# Patient Record
Sex: FEMALE | Race: WHITE | NOT HISPANIC OR LATINO | Employment: UNEMPLOYED | URBAN - NONMETROPOLITAN AREA
[De-identification: names, ages, dates, MRNs, and addresses within clinical notes are randomized per-mention and may not be internally consistent; named-entity substitution may affect disease eponyms.]

---

## 2018-03-22 ENCOUNTER — HOSPITAL ENCOUNTER (OUTPATIENT)
Facility: HOSPITAL | Age: 28
Setting detail: OBSERVATION
Discharge: HOME | End: 2018-03-23
Attending: EMERGENCY MEDICINE | Admitting: OBSTETRICS & GYNECOLOGY

## 2018-03-22 DIAGNOSIS — O21.0 HYPEREMESIS GRAVIDARUM: Primary | ICD-10-CM

## 2018-03-22 LAB
ANION GAP SERPL CALCULATED.3IONS-SCNC: 22.2 MMOL/L
BACTERIA UR QL AUTO: ABNORMAL /HPF
BILIRUB UR QL STRIP: ABNORMAL
BUN BLD-MCNC: 6 MG/DL (ref 7–20)
BUN/CREAT SERPL: 10 (ref 7.1–23.5)
CALCIUM SPEC-SCNC: 9.6 MG/DL (ref 8.4–10.2)
CHLORIDE SERPL-SCNC: 92 MMOL/L (ref 98–107)
CLARITY UR: ABNORMAL
CO2 SERPL-SCNC: 26 MMOL/L (ref 26–30)
COLOR UR: YELLOW
CREAT BLD-MCNC: 0.6 MG/DL (ref 0.6–1.3)
DEPRECATED RDW RBC AUTO: 43.4 FL (ref 37–54)
ERYTHROCYTE [DISTWIDTH] IN BLOOD BY AUTOMATED COUNT: 13.1 % (ref 11.5–14.5)
GFR SERPL CREATININE-BSD FRML MDRD: 118 ML/MIN/1.73
GLUCOSE BLD-MCNC: 89 MG/DL (ref 74–98)
GLUCOSE UR STRIP-MCNC: NEGATIVE MG/DL
HCG INTACT+B SERPL-ACNC: NORMAL MIU/ML
HCT VFR BLD AUTO: 43.6 % (ref 37–47)
HGB BLD-MCNC: 15.1 G/DL (ref 12–16)
HGB UR QL STRIP.AUTO: ABNORMAL
HYALINE CASTS UR QL AUTO: ABNORMAL /LPF
KETONES UR QL STRIP: ABNORMAL
LEUKOCYTE ESTERASE UR QL STRIP.AUTO: NEGATIVE
LYMPHOCYTES # BLD MANUAL: 1.48 10*3/MM3 (ref 0.6–3.4)
LYMPHOCYTES NFR BLD MANUAL: 11 % (ref 0–12)
LYMPHOCYTES NFR BLD MANUAL: 11 % (ref 10–50)
MCH RBC QN AUTO: 31.4 PG (ref 27–31)
MCHC RBC AUTO-ENTMCNC: 34.6 G/DL (ref 30–37)
MCV RBC AUTO: 90.6 FL (ref 81–99)
MONOCYTES # BLD AUTO: 1.48 10*3/MM3 (ref 0–0.9)
MUCOUS THREADS URNS QL MICRO: ABNORMAL /HPF
NEUTROPHILS # BLD AUTO: 10.52 10*3/MM3 (ref 2–6.9)
NEUTROPHILS NFR BLD MANUAL: 77 % (ref 37–80)
NEUTS BAND NFR BLD MANUAL: 1 % (ref 0–6)
NITRITE UR QL STRIP: NEGATIVE
PH UR STRIP.AUTO: 5.5 [PH] (ref 5–8)
PLATELET # BLD AUTO: 348 10*3/MM3 (ref 130–400)
PMV BLD AUTO: 9 FL (ref 6–12)
POTASSIUM BLD-SCNC: 3.2 MMOL/L (ref 3.5–5.1)
PROT UR QL STRIP: ABNORMAL
RBC # BLD AUTO: 4.81 10*6/MM3 (ref 4.2–5.4)
RBC # UR: ABNORMAL /HPF
RBC MORPH BLD: NORMAL
REF LAB TEST METHOD: ABNORMAL
SCAN SLIDE: NORMAL
SMALL PLATELETS BLD QL SMEAR: ADEQUATE
SODIUM BLD-SCNC: 137 MMOL/L (ref 137–145)
SP GR UR STRIP: >=1.03 (ref 1–1.03)
SQUAMOUS #/AREA URNS HPF: ABNORMAL /HPF
UROBILINOGEN UR QL STRIP: ABNORMAL
WBC MORPH BLD: NORMAL
WBC NRBC COR # BLD: 13.49 10*3/MM3 (ref 4.8–10.8)
WBC UR QL AUTO: ABNORMAL /HPF

## 2018-03-22 PROCEDURE — G0378 HOSPITAL OBSERVATION PER HR: HCPCS

## 2018-03-22 PROCEDURE — 25810000003 DEXTROSE-NACL PER 500 ML: Performed by: NURSE PRACTITIONER

## 2018-03-22 PROCEDURE — 85007 BL SMEAR W/DIFF WBC COUNT: CPT | Performed by: PHYSICIAN ASSISTANT

## 2018-03-22 PROCEDURE — 96375 TX/PRO/DX INJ NEW DRUG ADDON: CPT

## 2018-03-22 PROCEDURE — 96361 HYDRATE IV INFUSION ADD-ON: CPT

## 2018-03-22 PROCEDURE — 25010000002 PROCHLORPERAZINE EDISYLATE PER 10 MG: Performed by: PHYSICIAN ASSISTANT

## 2018-03-22 PROCEDURE — 96376 TX/PRO/DX INJ SAME DRUG ADON: CPT

## 2018-03-22 PROCEDURE — 84702 CHORIONIC GONADOTROPIN TEST: CPT | Performed by: PHYSICIAN ASSISTANT

## 2018-03-22 PROCEDURE — 25010000002 ONDANSETRON PER 1 MG: Performed by: PHYSICIAN ASSISTANT

## 2018-03-22 PROCEDURE — 99284 EMERGENCY DEPT VISIT MOD MDM: CPT

## 2018-03-22 PROCEDURE — 25010000003 POTASSIUM CHLORIDE 10 MEQ/100ML SOLUTION: Performed by: PHYSICIAN ASSISTANT

## 2018-03-22 PROCEDURE — 80048 BASIC METABOLIC PNL TOTAL CA: CPT | Performed by: PHYSICIAN ASSISTANT

## 2018-03-22 PROCEDURE — 25010000002 PROMETHAZINE PER 50 MG: Performed by: NURSE PRACTITIONER

## 2018-03-22 PROCEDURE — 81001 URINALYSIS AUTO W/SCOPE: CPT | Performed by: PHYSICIAN ASSISTANT

## 2018-03-22 PROCEDURE — 25010000002 PYRIDOXINE PER 100 MG: Performed by: NURSE PRACTITIONER

## 2018-03-22 PROCEDURE — 96365 THER/PROPH/DIAG IV INF INIT: CPT

## 2018-03-22 PROCEDURE — 85025 COMPLETE CBC W/AUTO DIFF WBC: CPT | Performed by: PHYSICIAN ASSISTANT

## 2018-03-22 RX ORDER — FAMOTIDINE 10 MG/ML
20 INJECTION, SOLUTION INTRAVENOUS EVERY 12 HOURS SCHEDULED
Status: DISCONTINUED | OUTPATIENT
Start: 2018-03-22 | End: 2018-03-23 | Stop reason: HOSPADM

## 2018-03-22 RX ORDER — POTASSIUM CHLORIDE 750 MG/1
40 CAPSULE, EXTENDED RELEASE ORAL ONCE
Status: DISCONTINUED | OUTPATIENT
Start: 2018-03-22 | End: 2018-03-23 | Stop reason: HOSPADM

## 2018-03-22 RX ORDER — SODIUM CHLORIDE 0.9 % (FLUSH) 0.9 %
1-10 SYRINGE (ML) INJECTION AS NEEDED
Status: DISCONTINUED | OUTPATIENT
Start: 2018-03-22 | End: 2018-03-23 | Stop reason: HOSPADM

## 2018-03-22 RX ORDER — ONDANSETRON 2 MG/ML
4 INJECTION INTRAMUSCULAR; INTRAVENOUS ONCE
Status: COMPLETED | OUTPATIENT
Start: 2018-03-22 | End: 2018-03-22

## 2018-03-22 RX ORDER — PROMETHAZINE HYDROCHLORIDE 25 MG/ML
12.5 INJECTION, SOLUTION INTRAMUSCULAR; INTRAVENOUS EVERY 4 HOURS PRN
Status: DISCONTINUED | OUTPATIENT
Start: 2018-03-22 | End: 2018-03-23 | Stop reason: HOSPADM

## 2018-03-22 RX ORDER — FAMOTIDINE 10 MG/ML
20 INJECTION, SOLUTION INTRAVENOUS ONCE
Status: COMPLETED | OUTPATIENT
Start: 2018-03-22 | End: 2018-03-22

## 2018-03-22 RX ORDER — DEXTROSE AND SODIUM CHLORIDE 5; .9 G/100ML; G/100ML
125 INJECTION, SOLUTION INTRAVENOUS CONTINUOUS
Status: DISCONTINUED | OUTPATIENT
Start: 2018-03-22 | End: 2018-03-23 | Stop reason: HOSPADM

## 2018-03-22 RX ORDER — POTASSIUM CHLORIDE 7.45 MG/ML
10 INJECTION INTRAVENOUS ONCE
Status: COMPLETED | OUTPATIENT
Start: 2018-03-22 | End: 2018-03-22

## 2018-03-22 RX ADMIN — POTASSIUM CHLORIDE 10 MEQ: 10 INJECTION, SOLUTION INTRAVENOUS at 16:56

## 2018-03-22 RX ADMIN — PROCHLORPERAZINE EDISYLATE 5 MG: 5 INJECTION INTRAMUSCULAR; INTRAVENOUS at 22:46

## 2018-03-22 RX ADMIN — DEXTROSE AND SODIUM CHLORIDE 125 ML/HR: 5; 900 INJECTION, SOLUTION INTRAVENOUS at 18:45

## 2018-03-22 RX ADMIN — FAMOTIDINE 20 MG: 10 INJECTION INTRAVENOUS at 15:19

## 2018-03-22 RX ADMIN — SODIUM CHLORIDE 1000 ML: 9 INJECTION, SOLUTION INTRAVENOUS at 15:19

## 2018-03-22 RX ADMIN — PROMETHAZINE HYDROCHLORIDE 12.5 MG: 25 INJECTION INTRAMUSCULAR; INTRAVENOUS at 19:08

## 2018-03-22 RX ADMIN — FAMOTIDINE 20 MG: 10 INJECTION, SOLUTION INTRAVENOUS at 20:58

## 2018-03-22 RX ADMIN — PYRIDOXINE HYDROCHLORIDE 50 MG: 100 INJECTION, SOLUTION INTRAMUSCULAR; INTRAVENOUS at 19:50

## 2018-03-22 RX ADMIN — ONDANSETRON 4 MG: 2 INJECTION INTRAMUSCULAR; INTRAVENOUS at 17:10

## 2018-03-22 RX ADMIN — PROCHLORPERAZINE EDISYLATE 5 MG: 5 INJECTION INTRAMUSCULAR; INTRAVENOUS at 16:12

## 2018-03-22 NOTE — ED PROVIDER NOTES
Subjective   28-year-old female around 4-6 weeks pregnant with her second pregnancy complaining of nausea and vomiting.  The patient states she has been unable to eat for the past 4 days.  She is able to hold down some fluids but vomits all food.  She was seen at the [place] [facility] 3 days ago and given IV fluids, IV Zofran and Phenergan.  The patient states this provided no relief.Denies any vaginal bleeding or spotting.  Does have some creamy discharge that she states is not bothersome.   Her last menstrual period was January 12 to the 14th.  She states this was a normal cycle for her.  She does not take any form of birth control.  She denies any urinary problems.  She does have some heartburn.  Denies any fever, chest pain, cough or difficulty breathing.  She smokes less than a half a pack of cigarettes daily.  She is an appointment to see Dr. Corona on the 28th.        History provided by:  Patient and parent  History limited by: no limits.   used: No        Review of Systems   Constitutional: Negative.  Negative for activity change, appetite change, fatigue and fever.   HENT: Negative.  Negative for congestion, ear pain, rhinorrhea, sneezing and sore throat.    Eyes: Negative.  Negative for pain, discharge and redness.   Respiratory: Negative.  Negative for cough, shortness of breath and wheezing.    Cardiovascular: Negative.    Gastrointestinal: Positive for nausea and vomiting. Negative for abdominal pain, constipation and diarrhea.   Endocrine: Negative.    Genitourinary: Negative.  Negative for difficulty urinating, dysuria and frequency.   Musculoskeletal: Negative.  Negative for back pain and neck pain.   Skin: Negative.  Negative for color change, pallor and rash.   All other systems reviewed and are negative.      History reviewed. No pertinent past medical history.    No Known Allergies    History reviewed. No pertinent surgical history.    History reviewed. No pertinent family  history.    Social History     Social History   • Marital status:      Social History Main Topics   • Smoking status: Current Every Day Smoker     Packs/day: 0.50   • Smokeless tobacco: Never Used   • Alcohol use No   • Drug use: No   • Sexual activity: Defer     Other Topics Concern   • Not on file           Objective   Physical Exam   Constitutional: She is oriented to person, place, and time. She appears well-developed and well-nourished. No distress.   HENT:   Head: Normocephalic and atraumatic.   Right Ear: External ear normal.   Left Ear: External ear normal.   Nose: Nose normal.   Mouth/Throat: No oropharyngeal exudate.   Dry oral mucosa   Eyes: Conjunctivae and EOM are normal. Pupils are equal, round, and reactive to light. Right eye exhibits no discharge. Left eye exhibits no discharge. No scleral icterus.   Neck: Normal range of motion. Neck supple. No JVD present. No tracheal deviation present.   Cardiovascular: Normal rate, regular rhythm and normal heart sounds.    Pulmonary/Chest: Effort normal and breath sounds normal. No stridor. No respiratory distress. She has no wheezes. She has no rales.   Abdominal: Soft. Bowel sounds are normal. She exhibits no distension and no mass. There is no tenderness. There is no rebound and no guarding. No hernia.   Musculoskeletal: Normal range of motion. She exhibits no edema, tenderness or deformity.   Neurological: She is alert and oriented to person, place, and time. No cranial nerve deficit. Coordination normal.   Skin: Skin is warm and dry. No rash noted. She is not diaphoretic. No erythema.   Psychiatric: She has a normal mood and affect. Her behavior is normal. Judgment and thought content normal.   Nursing note and vitals reviewed.      Procedures         ED Course  ED Course   Comment By Time   Still c/o nausea, no vomiting in ED Anuradha Hill PA-C 03/22 1544   BMP glucose 89, BUN 6, creatinine 0.6, sodium 137, potassium 3.2, chloride 92, CO2 26.   Urinalysis greater than 160 of ketones, 100 of protein, leukocyte Estrace and nitrite negative. Anuradha Hill PA-C 03/22 1547   Pt does not feel any better, is agreeable to admission if needed. Anuradha Hill PA-C 03/22 1611   CBC white count 13, hemoglobin 15.1, hematocrit 43.6, platelets 348. Anuradha Hill PA-C 03/22 1615   Discussed with Dr. Corona who accepted pt for obs/admit. Anuradha Hill PA-C 03/22 1622                  MDM  Number of Diagnoses or Management Options  Hyperemesis gravidarum: established and worsening     Amount and/or Complexity of Data Reviewed  Clinical lab tests: ordered and reviewed  Tests in the medicine section of CPT®: ordered and reviewed  Discuss the patient with other providers: yes    Risk of Complications, Morbidity, and/or Mortality  Presenting problems: moderate  Diagnostic procedures: moderate  Management options: moderate    Patient Progress  Patient progress: stable      Final diagnoses:   Hyperemesis gravidarum            Anuradha Hill PA-C  03/22/18 1635       Anuradha Hill PA-C  03/22/18 1636

## 2018-03-22 NOTE — ED NOTES
Dr. Corona was called at 1621 per SHEILA Ling. Call transferred at this time.      Babatunde Grewal  03/22/18 8916

## 2018-03-22 NOTE — ED NOTES
PT IS STILL EATING ICE CHIPS. PT STILL NAUSEATED BUT NO LONGER VOMITING.      Marla Almaraz, RN  03/22/18 9741

## 2018-03-23 ENCOUNTER — APPOINTMENT (OUTPATIENT)
Dept: ULTRASOUND IMAGING | Facility: HOSPITAL | Age: 28
End: 2018-03-23

## 2018-03-23 VITALS
DIASTOLIC BLOOD PRESSURE: 82 MMHG | BODY MASS INDEX: 31.99 KG/M2 | RESPIRATION RATE: 22 BRPM | HEIGHT: 65 IN | WEIGHT: 192 LBS | HEART RATE: 57 BPM | OXYGEN SATURATION: 98 % | SYSTOLIC BLOOD PRESSURE: 143 MMHG | TEMPERATURE: 96.5 F

## 2018-03-23 LAB
ANION GAP SERPL CALCULATED.3IONS-SCNC: 20.1 MMOL/L
BASOPHILS # BLD AUTO: 0.05 10*3/MM3 (ref 0–0.2)
BASOPHILS NFR BLD AUTO: 0.4 % (ref 0–2.5)
CHLORIDE SERPL-SCNC: 102 MMOL/L (ref 98–107)
CO2 SERPL-SCNC: 22 MMOL/L (ref 26–30)
DEPRECATED RDW RBC AUTO: 44.4 FL (ref 37–54)
EOSINOPHIL # BLD AUTO: 0.04 10*3/MM3 (ref 0–0.7)
EOSINOPHIL NFR BLD AUTO: 0.4 % (ref 0–7)
ERYTHROCYTE [DISTWIDTH] IN BLOOD BY AUTOMATED COUNT: 13.3 % (ref 11.5–14.5)
HCT VFR BLD AUTO: 37.4 % (ref 37–47)
HGB BLD-MCNC: 12.9 G/DL (ref 12–16)
IMM GRANULOCYTES # BLD: 0.05 10*3/MM3 (ref 0–0.06)
IMM GRANULOCYTES NFR BLD: 0.4 % (ref 0–0.6)
LYMPHOCYTES # BLD AUTO: 1.67 10*3/MM3 (ref 0.6–3.4)
LYMPHOCYTES NFR BLD AUTO: 15 % (ref 10–50)
MCH RBC QN AUTO: 31.4 PG (ref 27–31)
MCHC RBC AUTO-ENTMCNC: 34.5 G/DL (ref 30–37)
MCV RBC AUTO: 91 FL (ref 81–99)
MONOCYTES # BLD AUTO: 1.05 10*3/MM3 (ref 0–0.9)
MONOCYTES NFR BLD AUTO: 9.4 % (ref 0–12)
NEUTROPHILS # BLD AUTO: 8.27 10*3/MM3 (ref 2–6.9)
NEUTROPHILS NFR BLD AUTO: 74.4 % (ref 37–80)
NRBC BLD MANUAL-RTO: 0 /100 WBC (ref 0–0)
PLATELET # BLD AUTO: 284 10*3/MM3 (ref 130–400)
PMV BLD AUTO: 9.2 FL (ref 6–12)
POTASSIUM BLD-SCNC: 3.1 MMOL/L (ref 3.5–5.1)
RBC # BLD AUTO: 4.11 10*6/MM3 (ref 4.2–5.4)
SODIUM BLD-SCNC: 141 MMOL/L (ref 137–145)
TSH SERPL DL<=0.05 MIU/L-ACNC: 1.51 MIU/ML (ref 0.47–4.68)
WBC NRBC COR # BLD: 11.13 10*3/MM3 (ref 4.8–10.8)

## 2018-03-23 PROCEDURE — 76801 OB US < 14 WKS SINGLE FETUS: CPT

## 2018-03-23 PROCEDURE — 96376 TX/PRO/DX INJ SAME DRUG ADON: CPT

## 2018-03-23 PROCEDURE — 25810000003 DEXTROSE-NACL PER 500 ML: Performed by: NURSE PRACTITIONER

## 2018-03-23 PROCEDURE — G0378 HOSPITAL OBSERVATION PER HR: HCPCS

## 2018-03-23 PROCEDURE — 80051 ELECTROLYTE PANEL: CPT | Performed by: OBSTETRICS & GYNECOLOGY

## 2018-03-23 PROCEDURE — 99217 PR OBSERVATION CARE DISCHARGE MANAGEMENT: CPT | Performed by: NURSE PRACTITIONER

## 2018-03-23 PROCEDURE — 25010000002 PROMETHAZINE PER 50 MG: Performed by: NURSE PRACTITIONER

## 2018-03-23 PROCEDURE — 96361 HYDRATE IV INFUSION ADD-ON: CPT

## 2018-03-23 PROCEDURE — 85025 COMPLETE CBC W/AUTO DIFF WBC: CPT | Performed by: OBSTETRICS & GYNECOLOGY

## 2018-03-23 PROCEDURE — 25010000002 ONDANSETRON PER 1 MG

## 2018-03-23 PROCEDURE — 84443 ASSAY THYROID STIM HORMONE: CPT | Performed by: OBSTETRICS & GYNECOLOGY

## 2018-03-23 RX ORDER — ONDANSETRON 2 MG/ML
INJECTION INTRAMUSCULAR; INTRAVENOUS
Status: COMPLETED
Start: 2018-03-23 | End: 2018-03-23

## 2018-03-23 RX ORDER — ONDANSETRON 2 MG/ML
INJECTION INTRAMUSCULAR; INTRAVENOUS
Status: DISPENSED
Start: 2018-03-23 | End: 2018-03-23

## 2018-03-23 RX ORDER — ONDANSETRON 2 MG/ML
4 INJECTION INTRAMUSCULAR; INTRAVENOUS ONCE
Status: COMPLETED | OUTPATIENT
Start: 2018-03-23 | End: 2018-03-23

## 2018-03-23 RX ORDER — PROMETHAZINE HYDROCHLORIDE 25 MG/1
12.5 SUPPOSITORY RECTAL EVERY 6 HOURS PRN
Qty: 30 SUPPOSITORY | Refills: 0 | Status: SHIPPED | OUTPATIENT
Start: 2018-03-23 | End: 2018-06-28 | Stop reason: HOSPADM

## 2018-03-23 RX ORDER — RANITIDINE 150 MG/1
150 TABLET ORAL 2 TIMES DAILY
Qty: 60 TABLET | Refills: 2 | Status: SHIPPED | OUTPATIENT
Start: 2018-03-23 | End: 2018-04-22

## 2018-03-23 RX ORDER — SODIUM CHLORIDE 9 MG/ML
INJECTION, SOLUTION INTRAVENOUS
Status: DISCONTINUED
Start: 2018-03-23 | End: 2018-03-23 | Stop reason: HOSPADM

## 2018-03-23 RX ADMIN — DEXTROSE AND SODIUM CHLORIDE 125 ML/HR: 5; 900 INJECTION, SOLUTION INTRAVENOUS at 03:58

## 2018-03-23 RX ADMIN — ONDANSETRON 4 MG: 2 INJECTION INTRAMUSCULAR; INTRAVENOUS at 04:27

## 2018-03-23 RX ADMIN — FAMOTIDINE 20 MG: 10 INJECTION, SOLUTION INTRAVENOUS at 09:23

## 2018-03-23 RX ADMIN — PROMETHAZINE HYDROCHLORIDE 12.5 MG: 25 INJECTION INTRAMUSCULAR; INTRAVENOUS at 02:48

## 2018-03-23 RX ADMIN — PROMETHAZINE HYDROCHLORIDE 12.5 MG: 25 INJECTION INTRAMUSCULAR; INTRAVENOUS at 12:24

## 2018-03-23 RX ADMIN — PROMETHAZINE HYDROCHLORIDE 12.5 MG: 25 INJECTION INTRAMUSCULAR; INTRAVENOUS at 08:04

## 2018-03-23 NOTE — PLAN OF CARE
Problem: Hyperemesis Gravidarum (Adult,Obstetrics,Pediatric)  Goal: Signs and Symptoms of Listed Potential Problems Will be Absent, Minimized or Managed (Hyperemesis Gravidarum)   03/23/18 0342   Goal/Outcome Evaluation   Problems Assessed (Nausea/Vomiting/Hyperemesis in Pregnancy) all   Problems Present (Hyperemesis) none       Problem: Patient Care Overview  Goal: Plan of Care Review  Outcome: Ongoing (interventions implemented as appropriate)   03/23/18 0342   Coping/Psychosocial   Plan of Care Reviewed With patient   Plan of Care Review   Progress no change   OTHER   Outcome Summary intermittent nausea     Goal: Individualization and Mutuality  Outcome: Ongoing (interventions implemented as appropriate)   03/23/18 0342   Individualization   Patient Specific Interventions medications for nausea     Goal: Discharge Needs Assessment  Outcome: Ongoing (interventions implemented as appropriate)   03/23/18 0342   Discharge Needs Assessment   Concerns to be Addressed denies needs/concerns at this time   Patient/Family Anticipates Transition to home with family   Patient/Family Anticipated Services at Transition none   Transportation Concerns car, none   Anticipated Changes Related to Illness none   Equipment Needed After Discharge none   Disability   Equipment Currently Used at Home none

## 2018-03-23 NOTE — NURSING NOTE
Penelope in pt room to discuss POC and d/c.  Pt verbalized understanding and has no further questions.

## 2018-03-23 NOTE — PROGRESS NOTES
27 yo  admitted through ER last evening for hyperemesis  S-patient reports she still feels very nauseous this am- has had popsicle but nothing else thus far. She reports her LMP was  which would put her at 9-10 weeks gestation. She reports history of regular menses.   O-patient sitting up in bed this am. Appears no distress. VSS. Labs reviewed. Receiving potassium  A-hyperemesis- exact gestation unknown  P-transvaginal ultrasound ordered for dating-continue IV fluids and antiemetics

## 2018-03-23 NOTE — PLAN OF CARE
Problem: Patient Care Overview  Goal: Interprofessional Rounds/Family Conf  Outcome: Outcome(s) achieved Date Met: 03/23/18

## 2018-03-23 NOTE — DISCHARGE SUMMARY
Discharge Summary     Virgilio Levy  : 1990  MRN: [id number]  CSN: [id number]    Date of Admission: 3/22/2018   Date of Discharge:  3/23/2018       Admission Diagnosis: 1. Hyperemesis gravidarum [O21.0]  2. Hyperemesis gravidarum [O21.0]   Discharge Diagnosis: 1. Same as above plus  2. Pregnancy at Unknown     Hospital Course  Patient is a 28 y.o.  who at 8-10 wks gestation states she is able to tolerate fluids now.  Would like to go home - requesting phenergan suppository and desires something for acid reflux.  She does have a appt in our office in the next several weeks.       Min/max vitals past 24 hours:   Temp  Min: 96.5 °F (35.8 °C)  Max: 99.1 °F (37.3 °C)  BP  Min: 106/61  Max: 144/72  Pulse  Min: 55  Max: 110  Resp  Min: 18  Max: 24         General: well developed; well nourished  no acute distress   Heart: Not performed.   Lungs: breathing is unlabored   Abdomen: Not performed.   FHT's: too early for FHT's with doppler               Discharge labs  Lab Results   Component Value Date    WBC 11.13 (H) 2018    HGB 12.9 2018    HCT 37.4 2018     2018       Discharge Medications  There are no discharge medications for this patient.       Discharge Disposition sat - encourage PNV     Condition on Discharge: good   Follow-up: As scheduled     Brittany SARGENT/JAGDEEP  3/23/2018

## 2018-03-23 NOTE — NURSING NOTE
Pt d/c teaching done and instructions given.  Pt advised when to seek medical attention.  Pt verbalized understanding and has no further questions.

## 2018-03-23 NOTE — NURSING NOTE
"Spoke with patient regarding the risk of taking Zofran due to studies showing correlation of cleft palate in .Explained the risks.  Pt verbalized understanding but told this RN that she \"wanted it\".   "

## 2018-03-23 NOTE — PLAN OF CARE
Problem: Hyperemesis Gravidarum (Adult,Obstetrics,Pediatric)  Goal: Signs and Symptoms of Listed Potential Problems Will be Absent, Minimized or Managed (Hyperemesis Gravidarum)  Outcome: Outcome(s) achieved Date Met: 03/23/18 03/23/18 3526   Goal/Outcome Evaluation   Problems Assessed (Nausea/Vomiting/Hyperemesis in Pregnancy) all   Problems Present (Hyperemesis) none

## 2018-03-23 NOTE — PLAN OF CARE
Problem: Patient Care Overview  Goal: Plan of Care Review  Outcome: Outcome(s) achieved Date Met: 03/23/18 03/23/18 1723   Coping/Psychosocial   Plan of Care Reviewed With patient   Plan of Care Review   Progress improving   OTHER   Outcome Summary n/v improving     Goal: Individualization and Mutuality  Outcome: Outcome(s) achieved Date Met: 03/23/18 03/23/18 1723   Individualization   Patient Specific Preferences decreased n/v   Patient Specific Goals (Include Timeframe) n/v decreased   Patient Specific Interventions medications for n/v   Mutuality/Individual Preferences   What Anxieties, Fears, Concerns, or Questions Do You Have About Your Care? continuing to have n/v throughout pregnancy   What Information Would Help Us Give You More Personalized Care? has child at home   How Would You and/or Your Support Person Like to Participate in Your Care? able to choose foods that don't cause n/v   Mutuality/Individual Preferences   How to Address Anxieties/Fears support, comfort, reassure     Goal: Discharge Needs Assessment  Outcome: Outcome(s) achieved Date Met: 03/23/18 03/23/18 1723   Discharge Needs Assessment   Readmission Within the Last 30 Days no previous admission in last 30 days   Concerns to be Addressed no discharge needs identified   Patient/Family Anticipates Transition to home with family   Patient/Family Anticipated Services at Transition none   Transportation Anticipated family or friend will provide   Anticipated Changes Related to Illness none   Equipment Needed After Discharge none   Discharge Coordination/Progress d/c home with instructions and medications   Disability   Equipment Currently Used at Home none

## 2018-03-25 NOTE — H&P
Pt was seen in the emergency room - full phys exam and hx was documented  Pt accepted to 2nd floor for continuation of care for hyperemesis.  She received RSO for hyperemesis gravidarum - and was discharged home the next day - see d/c report.

## 2018-03-28 ENCOUNTER — INITIAL PRENATAL (OUTPATIENT)
Dept: OBSTETRICS AND GYNECOLOGY | Facility: CLINIC | Age: 28
End: 2018-03-28

## 2018-03-28 VITALS — BODY MASS INDEX: 31.12 KG/M2 | DIASTOLIC BLOOD PRESSURE: 68 MMHG | SYSTOLIC BLOOD PRESSURE: 124 MMHG | WEIGHT: 187 LBS

## 2018-03-28 DIAGNOSIS — Z34.91 PREGNANT AND NOT YET DELIVERED IN FIRST TRIMESTER: Primary | ICD-10-CM

## 2018-03-28 DIAGNOSIS — Z34.91 NORMAL PREGNANCY, FIRST TRIMESTER: ICD-10-CM

## 2018-03-28 LAB
C TRACH RRNA SPEC DONR QL NAA+PROBE: NEGATIVE
N GONORRHOEA DNA SPEC QL NAA+PROBE: NEGATIVE

## 2018-03-28 PROCEDURE — 99214 OFFICE O/P EST MOD 30 MIN: CPT | Performed by: NURSE PRACTITIONER

## 2018-03-28 NOTE — PROGRESS NOTES
Chief Complaint   Patient presents with   • Initial Prenatal Visit     LMP 17, seen in ED on 3/23/18 for nausea and vomiting and had scan there, pt due for pap smear, large ketones on urine dip         HPI  , 10w5d presents to our office today for initial prenatal visit.  She reports 1st trimester discomforts of pregnancy including, n/v, fatigue, andres/irritability.  She also reports she was in hospital last wk for hyperemesis and now feeling better.      Review Hx: + smoker - trying to quit  Not presently working or in school   Not taking PNV       Past Medical History:   Diagnosis Date   • Acid reflux    • Anemia         Current Outpatient Prescriptions:   •  promethazine (PHENERGAN) 25 MG suppository, Insert 0.5 suppositories into the rectum Every 6 (Six) Hours As Needed for Nausea for up to 30 doses., Disp: 30 suppository, Rfl: 0  •  raNITIdine (ZANTAC) 150 MG tablet, Take 1 tablet by mouth 2 (Two) Times a Day for 30 days., Disp: 60 tablet, Rfl: 2   No Known Allergies   Past Surgical History:   Procedure Laterality Date   • MANDIBLE SURGERY         Social History     Social History   • Marital status:      Spouse name: N/A   • Number of children: N/A   • Years of education: N/A     Occupational History   • Not on file.     Social History Main Topics   • Smoking status: Current Every Day Smoker     Packs/day: 0.50   • Smokeless tobacco: Never Used   • Alcohol use No   • Drug use: No   • Sexual activity: Yes     Partners: Male     Birth control/ protection: None     Other Topics Concern   • Not on file     Social History Narrative   • No narrative on file      Family History   Problem Relation Age of Onset   • Hypertension Father    • Coronary artery disease Father    • Hypertension Mother    • Diabetes Mother    • Diabetes Paternal Grandfather        The following portions of the patient's history were reviewed and updated as appropriate:problem list, current medications, allergies, past  family history, past medical history, past social history and past surgical history.    ROS    Pertinent items are noted in HPI, all other systems reviewed and negative    Physical Exam  /68   Wt 84.8 kg (187 lb)   LMP 01/13/2018 (Exact Date)   BMI 31.12 kg/m²        Psych: Altert and oriented to time, place and person  Mood and affect appropriate   General: well developed; well nourished  no acute distress  Head: normocephalic  Neck: The neck is supple and the trachea is midline  Musculoskeletal: Normal gait  Full range of motion  Lungs:  breathing is unlabored  Back: Negative CVAT  Abdomen: Soft, non-tender, no organomegaly  Lower Extremities: LE: Neg edema  Genitourinary: External Genitalia without erythema, lesions, or masses, Urethral Meatus is without erythema, edema, prolapse or lesions., Bladder - Palpation at the region above the symphysis pubis , Vagina - There is no excessive vaginal discharge & vaginal walls reveals moist vaginal mucosa without inflammation or lesions. There is not evidence of pelvic relaxation , Cervix  is smooth, pink, and without discharge. Negative cervical motion tenderness , Uterus - uterine body is mobile and of normal size, shape, & without tenderness, Adnexa structures are nontender and without masses, Perineum is without inflammation or lesions      MDM  Impression:  Problems/Risks: Pregnancy with Active Problems(s) &/or Complication(s)  dehydration / resolving / nausea -vomiting   Discomforts of pregnancy  Tobacco use in pregnancy   Tests done today: Pap  Routine NOB labs / U/A Culture / GC/CT - option for CF screening    Topics discussed: Rout NOB education including nutrition, exercise, OTCmeds, genetic screening   adeq rest and fluids   phenergan  Tobacco use  encouraged questions - call prn    Tests next visit: none

## 2018-03-29 LAB
ABO GROUP BLD: (no result)
BASOPHILS # BLD AUTO: 0 X10E3/UL (ref 0–0.2)
BASOPHILS NFR BLD AUTO: 0 %
BLD GP AB SCN SERPL QL: NEGATIVE
EOSINOPHIL # BLD AUTO: 0.5 X10E3/UL (ref 0–0.4)
EOSINOPHIL NFR BLD AUTO: 4 %
ERYTHROCYTE [DISTWIDTH] IN BLOOD BY AUTOMATED COUNT: 14.5 % (ref 12.3–15.4)
HBV SURFACE AG SERPL QL IA: NEGATIVE
HCT VFR BLD AUTO: 43.8 % (ref 34–46.6)
HGB BLD-MCNC: 14.9 G/DL (ref 11.1–15.9)
HIV 1+2 AB+HIV1 P24 AG SERPL QL IA: NON REACTIVE
IMM GRANULOCYTES # BLD: 0 X10E3/UL (ref 0–0.1)
IMM GRANULOCYTES NFR BLD: 0 %
LYMPHOCYTES # BLD AUTO: 1.5 X10E3/UL (ref 0.7–3.1)
LYMPHOCYTES NFR BLD AUTO: 14 %
MCH RBC QN AUTO: 31.4 PG (ref 26.6–33)
MCHC RBC AUTO-ENTMCNC: 34 G/DL (ref 31.5–35.7)
MCV RBC AUTO: 92 FL (ref 79–97)
MONOCYTES # BLD AUTO: 0.8 X10E3/UL (ref 0.1–0.9)
MONOCYTES NFR BLD AUTO: 7 %
NEUTROPHILS # BLD AUTO: 7.8 X10E3/UL (ref 1.4–7)
NEUTROPHILS NFR BLD AUTO: 75 %
PLATELET # BLD AUTO: 354 X10E3/UL (ref 150–379)
RBC # BLD AUTO: 4.74 X10E6/UL (ref 3.77–5.28)
RH BLD: POSITIVE
RPR SER QL: NON REACTIVE
RUBV IGG SERPL IA-ACNC: 0.95 INDEX
WBC # BLD AUTO: 10.5 X10E3/UL (ref 3.4–10.8)

## 2018-03-30 LAB
APPEARANCE UR: ABNORMAL
BACTERIA #/AREA URNS HPF: ABNORMAL /HPF
BACTERIA UR CULT: NORMAL
BACTERIA UR CULT: NORMAL
BILIRUB UR QL STRIP: NEGATIVE
COLOR UR: YELLOW
CRYSTALS URNS MICRO: ABNORMAL
EPI CELLS #/AREA URNS HPF: ABNORMAL /HPF
GLUCOSE UR QL: NEGATIVE
HGB UR QL STRIP: NEGATIVE
KETONES UR QL STRIP: ABNORMAL
LEUKOCYTE ESTERASE UR QL STRIP: ABNORMAL
MICRO URNS: ABNORMAL
MUCOUS THREADS URNS QL MICRO: PRESENT /HPF
NITRITE UR QL STRIP: NEGATIVE
PH UR STRIP: 6 [PH] (ref 5–7.5)
PROT UR QL STRIP: ABNORMAL
RBC #/AREA URNS HPF: ABNORMAL /HPF
SP GR UR: >=1.03 (ref 1–1.03)
UNIDENT CRYS URNS QL MICRO: PRESENT /LPF
URINALYSIS REFLEX: ABNORMAL
UROBILINOGEN UR STRIP-MCNC: 1 MG/DL (ref 0.2–1)
WBC #/AREA URNS HPF: ABNORMAL /HPF

## 2018-04-13 DIAGNOSIS — Z34.91 PREGNANT AND NOT YET DELIVERED IN FIRST TRIMESTER: ICD-10-CM

## 2018-04-25 ENCOUNTER — ROUTINE PRENATAL (OUTPATIENT)
Dept: OBSTETRICS AND GYNECOLOGY | Facility: CLINIC | Age: 28
End: 2018-04-25

## 2018-04-25 VITALS — WEIGHT: 191 LBS | DIASTOLIC BLOOD PRESSURE: 70 MMHG | SYSTOLIC BLOOD PRESSURE: 124 MMHG | BODY MASS INDEX: 31.78 KG/M2

## 2018-04-25 DIAGNOSIS — Z34.92 NORMAL PREGNANCY, SECOND TRIMESTER: Primary | ICD-10-CM

## 2018-04-25 PROCEDURE — 99213 OFFICE O/P EST LOW 20 MIN: CPT | Performed by: NURSE PRACTITIONER

## 2018-04-25 NOTE — PROGRESS NOTES
[id number]  Chief Complaint   Patient presents with   • Routine Prenatal Visit     no complaints         HPI  , 14w5d reports doing well with x bad acid reflux    ROS:  GI: Nausea - No; Constipation - No; Diarrhea - No    Neuro: Headache - No; Visual change - No        EXAM  General Appearance:  Lungs: Breathing unlabored  Abdomen:  See flow sheet for Fundal ht, FM, FHT's  LE: Neg edema    MDM  Impression:  Problems/Risk: Normal Pregnancy  Acid reflux    Tests done today: none   Topics discussed: zantac  encouraged questions - call prn    Tests next visit: option for MSAFP  U/S for anatomical structure     OB History      Para Term  AB Living    2 1 1   1    SAB TAB Ectopic Molar Multiple Live Births         1          Past Medical History:   Diagnosis Date   • Acid reflux    • Anemia        Past Surgical History:   Procedure Laterality Date   • MANDIBLE SURGERY         Family History   Problem Relation Age of Onset   • Hypertension Father    • Coronary artery disease Father    • Hypertension Mother    • Diabetes Mother    • Diabetes Paternal Grandfather        Social History     Social History   • Marital status:      Spouse name: N/A   • Number of children: N/A   • Years of education: N/A     Occupational History   • Not on file.     Social History Main Topics   • Smoking status: Current Every Day Smoker     Packs/day: 0.50   • Smokeless tobacco: Never Used   • Alcohol use No   • Drug use: No   • Sexual activity: Yes     Partners: Male     Birth control/ protection: None     Other Topics Concern   • Not on file     Social History Narrative   • No narrative on file

## 2018-04-25 NOTE — PATIENT INSTRUCTIONS
Second Trimester of Pregnancy  The second trimester is from week 13 through week 28, months 4 through 6. The second trimester is often a time when you feel your best. Your body has also adjusted to being pregnant, and you begin to feel better physically. Usually, morning sickness has lessened or quit completely, you may have more energy, and you may have an increase in appetite. The second trimester is also a time when the fetus is growing rapidly. At the end of the sixth month, the fetus is about 9 inches long and weighs about 1½ pounds. You will likely begin to feel the baby move (quickening) between 18 and 20 weeks of the pregnancy.  BODY CHANGES  Your body goes through many changes during pregnancy. The changes vary from woman to woman.   · Your weight will continue to increase. You will notice your lower abdomen bulging out.  · You may begin to get stretch marks on your hips, abdomen, and breasts.  · You may develop headaches that can be relieved by medicines approved by your health care provider.  · You may urinate more often because the fetus is pressing on your bladder.  · You may develop or continue to have heartburn as a result of your pregnancy.  · You may develop constipation because certain hormones are causing the muscles that push waste through your intestines to slow down.  · You may develop hemorrhoids or swollen, bulging veins (varicose veins).  · You may have back pain because of the weight gain and pregnancy hormones relaxing your joints between the bones in your pelvis and as a result of a shift in weight and the muscles that support your balance.  · Your breasts will continue to grow and be tender.  · Your gums may bleed and may be sensitive to brushing and flossing.  · Dark spots or blotches (chloasma, mask of pregnancy) may develop on your face. This will likely fade after the baby is born.  · A dark line from your belly button to the pubic area (linea nigra) may appear. This will likely fade  after the baby is born.  · You may have changes in your hair. These can include thickening of your hair, rapid growth, and changes in texture. Some women also have hair loss during or after pregnancy, or hair that feels dry or thin. Your hair will most likely return to normal after your baby is born.  WHAT TO EXPECT AT YOUR PRENATAL VISITS  During a routine prenatal visit:  · You will be weighed to make sure you and the fetus are growing normally.  · Your blood pressure will be taken.  · Your abdomen will be measured to track your baby's growth.  · The fetal heartbeat will be listened to.  · Any test results from the previous visit will be discussed.  Your health care provider may ask you:  · How you are feeling.  · If you are feeling the baby move.  · If you have had any abnormal symptoms, such as leaking fluid, bleeding, severe headaches, or abdominal cramping.  · If you are using any tobacco products, including cigarettes, chewing tobacco, and electronic cigarettes.  · If you have any questions.  Other tests that may be performed during your second trimester include:  · Blood tests that check for:  ¨ Low iron levels (anemia).  ¨ Gestational diabetes (between 24 and 28 weeks).  ¨ Rh antibodies.  · Urine tests to check for infections, diabetes, or protein in the urine.  · An ultrasound to confirm the proper growth and development of the baby.  · An amniocentesis to check for possible genetic problems.  · Fetal screens for spina bifida and Down syndrome.  · HIV (human immunodeficiency virus) testing. Routine prenatal testing includes screening for HIV, unless you choose not to have this test.  HOME CARE INSTRUCTIONS   · Avoid all smoking, herbs, alcohol, and unprescribed drugs. These chemicals affect the formation and growth of the baby.  · Do not use any tobacco products, including cigarettes, chewing tobacco, and electronic cigarettes. If you need help quitting, ask your health care provider. You may receive  counseling support and other resources to help you quit.  · Follow your health care provider's instructions regarding medicine use. There are medicines that are either safe or unsafe to take during pregnancy.  · Exercise only as directed by your health care provider. Experiencing uterine cramps is a good sign to stop exercising.  · Continue to eat regular, healthy meals.  · Wear a good support bra for breast tenderness.  · Do not use hot tubs, steam rooms, or saunas.  · Wear your seat belt at all times when driving.  · Avoid raw meat, uncooked cheese, cat litter boxes, and soil used by cats. These carry germs that can cause birth defects in the baby.  · Take your prenatal vitamins.  · Take [id number] mg of calcium daily starting at the 20th week of pregnancy until you deliver your baby.  · Try taking a stool softener (if your health care provider approves) if you develop constipation. Eat more high-fiber foods, such as fresh vegetables or fruit and whole grains. Drink plenty of fluids to keep your urine clear or pale yellow.  · Take warm sitz baths to soothe any pain or discomfort caused by hemorrhoids. Use hemorrhoid cream if your health care provider approves.  · If you develop varicose veins, wear support hose. Elevate your feet for 15 minutes, 3-4 times a day. Limit salt in your diet.  · Avoid heavy lifting, wear low heel shoes, and practice good posture.  · Rest with your legs elevated if you have leg cramps or low back pain.  · Visit your dentist if you have not gone yet during your pregnancy. Use a soft toothbrush to brush your teeth and be gentle when you floss.  · A sexual relationship may be continued unless your health care provider directs you otherwise.  · Continue to go to all your prenatal visits as directed by your health care provider.  SEEK MEDICAL CARE IF:   · You have dizziness.  · You have mild pelvic cramps, pelvic pressure, or nagging pain in the abdominal area.  · You have persistent nausea,  vomiting, or diarrhea.  · You have a bad smelling vaginal discharge.  · You have pain with urination.  SEEK IMMEDIATE MEDICAL CARE IF:   · You have a fever.  · You are leaking fluid from your vagina.  · You have spotting or bleeding from your vagina.  · You have severe abdominal cramping or pain.  · You have rapid weight gain or loss.  · You have shortness of breath with chest pain.  · You notice sudden or extreme swelling of your face, hands, ankles, feet, or legs.  · You have not felt your baby move in over an hour.  · You have severe headaches that do not go away with medicine.  · You have vision changes.     This information is not intended to replace advice given to you by your health care provider. Make sure you discuss any questions you have with your health care provider.Heartburn During Pregnancy    Heartburn happens when stomach acid goes up into the esophagus. The esophagus is the tube between the mouth and the stomach. This acid causes a burning pain in the chest or throat. This happens more often in the later part of pregnancy because the womb (uterus) gets larger. It may also happen because of hormone changes. Heartburn problems often go away after giving birth.  HOME CARE  · Take all medicine as told by your doctor.  · Raise the head of your bed with blocks only as told by your doctor.  · Do not exercise right after eating.  · Avoid eating 2-3 hours before bed. Do not lie down right after eating.  · Eat small meals throughout the day instead of 3 large meals.  · Avoid foods that give you heartburn. Foods you may want to avoid include:  ¨ Peppers.  ¨ Chocolate.  ¨ High-fat foods, including fried foods.  ¨ Spicy foods.  ¨ Garlic and onions.  ¨ Citrus fruits, including oranges, grapefruit, fritz, and limes.  ¨ Food containing tomatoes or tomato products.  ¨ Mint.  ¨ Bubbly (carbonated) drinks and drinks with caffeine.  ¨ Vinegar.  GET HELP IF:  · You have any belly (abdominal) pain.  · You feel burning  in your upper belly or chest, especially after eating or lying down.  · You feel sick to your stomach (nauseous) and throw up (vomit).  · Your stomach feels upset after you eat.  GET HELP RIGHT AWAY IF:  · You have bad chest pain that goes down your arm or into your jaw or neck.  · You feel sweaty, dizzy, or light-headed.  · You have trouble breathing.  · You throw up blood.  · You have trouble or pain when swallowing.  · You have bloody or black poop (stool).  · You have heartburn more than 3 times a week, for more than 2 weeks.  MAKE SURE YOU:  · Understand these instructions.  · Will watch your condition.  · Will get help right away if you are not doing well or get worse.     This information is not intended to replace advice given to you by your health care provider. Make sure you discuss any questions you have with your health care provider.Heartburn During Pregnancy    Heartburn happens when stomach acid goes up into the esophagus. The esophagus is the tube between the mouth and the stomach. This acid causes a burning pain in the chest or throat. This happens more often in the later part of pregnancy because the womb (uterus) gets larger. It may also happen because of hormone changes. Heartburn problems often go away after giving birth.  HOME CARE  · Take all medicine as told by your doctor.  · Raise the head of your bed with blocks only as told by your doctor.  · Do not exercise right after eating.  · Avoid eating 2-3 hours before bed. Do not lie down right after eating.  · Eat small meals throughout the day instead of 3 large meals.  · Avoid foods that give you heartburn. Foods you may want to avoid include:  ¨ Peppers.  ¨ Chocolate.  ¨ High-fat foods, including fried foods.  ¨ Spicy foods.  ¨ Garlic and onions.  ¨ Citrus fruits, including oranges, grapefruit, fritz, and limes.  ¨ Food containing tomatoes or tomato products.  ¨ Mint.  ¨ Bubbly (carbonated) drinks and drinks with caffeine.  ¨ Vinegar.  GET  HELP IF:  · You have any belly (abdominal) pain.  · You feel burning in your upper belly or chest, especially after eating or lying down.  · You feel sick to your stomach (nauseous) and throw up (vomit).  · Your stomach feels upset after you eat.  GET HELP RIGHT AWAY IF:  · You have bad chest pain that goes down your arm or into your jaw or neck.  · You feel sweaty, dizzy, or light-headed.  · You have trouble breathing.  · You throw up blood.  · You have trouble or pain when swallowing.  · You have bloody or black poop (stool).  · You have heartburn more than 3 times a week, for more than 2 weeks.  MAKE SURE YOU:  · Understand these instructions.  · Will watch your condition.  · Will get help right away if you are not doing well or get worse.     This information is not intended to replace advice given to you by your health care provider. Make sure you discuss any questions you have with your health care provider.

## 2018-05-28 ENCOUNTER — HOSPITAL ENCOUNTER (EMERGENCY)
Facility: HOSPITAL | Age: 28
Discharge: HOME | End: 2018-05-28
Attending: EMERGENCY MEDICINE | Admitting: EMERGENCY MEDICINE

## 2018-05-28 VITALS
TEMPERATURE: 97.9 F | HEART RATE: 85 BPM | DIASTOLIC BLOOD PRESSURE: 79 MMHG | OXYGEN SATURATION: 99 % | BODY MASS INDEX: 31.99 KG/M2 | WEIGHT: 192 LBS | HEIGHT: 65 IN | SYSTOLIC BLOOD PRESSURE: 137 MMHG | RESPIRATION RATE: 18 BRPM

## 2018-05-28 DIAGNOSIS — N30.00 ACUTE CYSTITIS WITHOUT HEMATURIA: ICD-10-CM

## 2018-05-28 DIAGNOSIS — K21.9 GASTROESOPHAGEAL REFLUX DISEASE WITHOUT ESOPHAGITIS: Primary | ICD-10-CM

## 2018-05-28 DIAGNOSIS — O21.0 HYPEREMESIS GRAVIDARUM: ICD-10-CM

## 2018-05-28 LAB
ALBUMIN SERPL-MCNC: 4.7 G/DL (ref 3.5–5)
ALBUMIN/GLOB SERPL: 1.2 G/DL (ref 1–2)
ALP SERPL-CCNC: 71 U/L (ref 38–126)
ALT SERPL W P-5'-P-CCNC: 23 U/L (ref 13–69)
ANION GAP SERPL CALCULATED.3IONS-SCNC: 22.4 MMOL/L (ref 10–20)
AST SERPL-CCNC: 21 U/L (ref 15–46)
BACTERIA UR QL AUTO: ABNORMAL /HPF
BASOPHILS # BLD AUTO: 0.06 10*3/MM3 (ref 0–0.2)
BASOPHILS NFR BLD AUTO: 0.3 % (ref 0–2.5)
BILIRUB SERPL-MCNC: 0.6 MG/DL (ref 0.2–1.3)
BILIRUB UR QL STRIP: ABNORMAL
BUN BLD-MCNC: 6 MG/DL (ref 7–20)
BUN/CREAT SERPL: 12 (ref 7.1–23.5)
CALCIUM SPEC-SCNC: 9.9 MG/DL (ref 8.4–10.2)
CHLORIDE SERPL-SCNC: 100 MMOL/L (ref 98–107)
CLARITY UR: ABNORMAL
CO2 SERPL-SCNC: 23 MMOL/L (ref 26–30)
COLOR UR: YELLOW
CREAT BLD-MCNC: 0.5 MG/DL (ref 0.6–1.3)
DEPRECATED RDW RBC AUTO: 46.5 FL (ref 37–54)
EOSINOPHIL # BLD AUTO: 0.02 10*3/MM3 (ref 0–0.7)
EOSINOPHIL NFR BLD AUTO: 0.1 % (ref 0–7)
ERYTHROCYTE [DISTWIDTH] IN BLOOD BY AUTOMATED COUNT: 13.7 % (ref 11.5–14.5)
GFR SERPL CREATININE-BSD FRML MDRD: 146 ML/MIN/1.73
GLOBULIN UR ELPH-MCNC: 4 GM/DL
GLUCOSE BLD-MCNC: 101 MG/DL (ref 74–98)
GLUCOSE UR STRIP-MCNC: NEGATIVE MG/DL
HCT VFR BLD AUTO: 40.7 % (ref 37–47)
HGB BLD-MCNC: 14.5 G/DL (ref 12–16)
HGB UR QL STRIP.AUTO: ABNORMAL
HYALINE CASTS UR QL AUTO: ABNORMAL /LPF
IMM GRANULOCYTES # BLD: 0.09 10*3/MM3 (ref 0–0.06)
IMM GRANULOCYTES NFR BLD: 0.5 % (ref 0–0.6)
KETONES UR QL STRIP: ABNORMAL
LEUKOCYTE ESTERASE UR QL STRIP.AUTO: NEGATIVE
LIPASE SERPL-CCNC: 60 U/L (ref 23–300)
LYMPHOCYTES # BLD AUTO: 1.27 10*3/MM3 (ref 0.6–3.4)
LYMPHOCYTES NFR BLD AUTO: 7.3 % (ref 10–50)
MCH RBC QN AUTO: 33.3 PG (ref 27–31)
MCHC RBC AUTO-ENTMCNC: 35.6 G/DL (ref 30–37)
MCV RBC AUTO: 93.3 FL (ref 81–99)
MONOCYTES # BLD AUTO: 0.76 10*3/MM3 (ref 0–0.9)
MONOCYTES NFR BLD AUTO: 4.4 % (ref 0–12)
MUCOUS THREADS URNS QL MICRO: ABNORMAL /HPF
NEUTROPHILS # BLD AUTO: 15.24 10*3/MM3 (ref 2–6.9)
NEUTROPHILS NFR BLD AUTO: 87.4 % (ref 37–80)
NITRITE UR QL STRIP: NEGATIVE
NRBC BLD MANUAL-RTO: 0 /100 WBC (ref 0–0)
PH UR STRIP.AUTO: 6 [PH] (ref 5–8)
PLATELET # BLD AUTO: 279 10*3/MM3 (ref 130–400)
PMV BLD AUTO: 9.8 FL (ref 6–12)
POTASSIUM BLD-SCNC: 3.4 MMOL/L (ref 3.5–5.1)
PROT SERPL-MCNC: 8.7 G/DL (ref 6.3–8.2)
PROT UR QL STRIP: ABNORMAL
RBC # BLD AUTO: 4.36 10*6/MM3 (ref 4.2–5.4)
RBC # UR: ABNORMAL /HPF
REF LAB TEST METHOD: ABNORMAL
SODIUM BLD-SCNC: 142 MMOL/L (ref 137–145)
SP GR UR STRIP: >=1.03 (ref 1–1.03)
SQUAMOUS #/AREA URNS HPF: ABNORMAL /HPF
UROBILINOGEN UR QL STRIP: ABNORMAL
WBC NRBC COR # BLD: 17.44 10*3/MM3 (ref 4.8–10.8)
WBC UR QL AUTO: ABNORMAL /HPF

## 2018-05-28 PROCEDURE — 85025 COMPLETE CBC W/AUTO DIFF WBC: CPT | Performed by: PHYSICIAN ASSISTANT

## 2018-05-28 PROCEDURE — 83690 ASSAY OF LIPASE: CPT | Performed by: PHYSICIAN ASSISTANT

## 2018-05-28 PROCEDURE — 81001 URINALYSIS AUTO W/SCOPE: CPT | Performed by: PHYSICIAN ASSISTANT

## 2018-05-28 PROCEDURE — 96374 THER/PROPH/DIAG INJ IV PUSH: CPT

## 2018-05-28 PROCEDURE — 80053 COMPREHEN METABOLIC PANEL: CPT | Performed by: PHYSICIAN ASSISTANT

## 2018-05-28 PROCEDURE — 99283 EMERGENCY DEPT VISIT LOW MDM: CPT

## 2018-05-28 PROCEDURE — 96375 TX/PRO/DX INJ NEW DRUG ADDON: CPT

## 2018-05-28 PROCEDURE — 96361 HYDRATE IV INFUSION ADD-ON: CPT

## 2018-05-28 PROCEDURE — 25010000002 PROMETHAZINE PER 50 MG: Performed by: PHYSICIAN ASSISTANT

## 2018-05-28 RX ORDER — PANTOPRAZOLE SODIUM 40 MG/1
40 TABLET, DELAYED RELEASE ORAL DAILY
Qty: 30 TABLET | Refills: 0 | Status: SHIPPED | OUTPATIENT
Start: 2018-05-28 | End: 2018-11-29

## 2018-05-28 RX ORDER — NITROFURANTOIN 25; 75 MG/1; MG/1
100 CAPSULE ORAL 2 TIMES DAILY
Qty: 10 CAPSULE | Refills: 0 | Status: SHIPPED | OUTPATIENT
Start: 2018-05-28 | End: 2018-06-02

## 2018-05-28 RX ORDER — POTASSIUM CHLORIDE 750 MG/1
20 CAPSULE, EXTENDED RELEASE ORAL ONCE
Status: COMPLETED | OUTPATIENT
Start: 2018-05-28 | End: 2018-05-28

## 2018-05-28 RX ORDER — FAMOTIDINE 10 MG/ML
20 INJECTION, SOLUTION INTRAVENOUS ONCE
Status: COMPLETED | OUTPATIENT
Start: 2018-05-28 | End: 2018-05-28

## 2018-05-28 RX ORDER — RANITIDINE 150 MG/1
150 TABLET ORAL 2 TIMES DAILY
COMMUNITY
End: 2018-06-28 | Stop reason: HOSPADM

## 2018-05-28 RX ORDER — PROMETHAZINE HYDROCHLORIDE 25 MG/ML
12.5 INJECTION, SOLUTION INTRAMUSCULAR; INTRAVENOUS ONCE
Status: COMPLETED | OUTPATIENT
Start: 2018-05-28 | End: 2018-05-28

## 2018-05-28 RX ORDER — POTASSIUM CHLORIDE 750 MG/1
10 TABLET, FILM COATED, EXTENDED RELEASE ORAL DAILY
Qty: 3 TABLET | Refills: 0 | Status: SHIPPED | OUTPATIENT
Start: 2018-05-28 | End: 2018-05-31

## 2018-05-28 RX ADMIN — FAMOTIDINE 20 MG: 10 INJECTION INTRAVENOUS at 11:44

## 2018-05-28 RX ADMIN — SODIUM CHLORIDE 1000 ML: 9 INJECTION, SOLUTION INTRAVENOUS at 11:44

## 2018-05-28 RX ADMIN — POTASSIUM CHLORIDE 20 MEQ: 750 CAPSULE, EXTENDED RELEASE ORAL at 13:41

## 2018-05-28 RX ADMIN — LIDOCAINE HYDROCHLORIDE: 20 SOLUTION ORAL; TOPICAL at 12:45

## 2018-05-28 RX ADMIN — PROMETHAZINE HYDROCHLORIDE 12.5 MG: 25 INJECTION INTRAMUSCULAR; INTRAVENOUS at 11:44

## 2018-05-31 ENCOUNTER — ROUTINE PRENATAL (OUTPATIENT)
Dept: OBSTETRICS AND GYNECOLOGY | Facility: CLINIC | Age: 28
End: 2018-05-31

## 2018-05-31 VITALS — BODY MASS INDEX: 30.12 KG/M2 | SYSTOLIC BLOOD PRESSURE: 122 MMHG | DIASTOLIC BLOOD PRESSURE: 67 MMHG | WEIGHT: 181 LBS

## 2018-05-31 DIAGNOSIS — Z34.92 NORMAL PREGNANCY, SECOND TRIMESTER: Primary | ICD-10-CM

## 2018-05-31 PROCEDURE — 99213 OFFICE O/P EST LOW 20 MIN: CPT | Performed by: NURSE PRACTITIONER

## 2018-05-31 NOTE — PROGRESS NOTES
[id number]  Chief Complaint   Patient presents with   • Routine Prenatal Visit     Patient advised went to ER on Monday for reflux and severe vomiting. Was also diagnosed with UTI but hasn't started antibiotics yet due to unable to swallow.         HPI  , 19w6d reports doing much better with protonix - tolerating fluids now  Does not feel like s/s UTI    + FM         ROS  /67   Wt 82.1 kg (181 lb)   LMP 2018 (Exact Date)   BMI 30.12 kg/m²  -See Prenatal Assessment    ROS:  GI: Nausea - improved as compared to the prior visit; Constipation - No; Diarrhea - No    Neuro: Headache - No; Visual change - No      EXAM  General Appearance: pleasant  Lungs: Breathing unlabored  Abdomen:  See flow sheet for Fundal ht, FM, FHT's  LE: Neg edema    MDM  Impression:  Problems/Risk Pregnancy with Active Problems(s) &/or Complication(s)  GERD in pregnancy   Tests done today: CC U/A culture   Last wk u/s rev'd - normal boy    Topics discussed: continue to note FM  Continue protonix as prescribed - written info on acid reflux in pregnancy provided   Encouraged small frequent healthy meals   Wt cks at home -   Inst re: CC u/a today   Option for MSAFP - declined    Tests next visit: none     OB History      Para Term  AB Living    2 1 1     1    SAB TAB Ectopic Molar Multiple Live Births              1          Past Medical History:   Diagnosis Date   • Acid reflux    • Anemia        Past Surgical History:   Procedure Laterality Date   • MANDIBLE SURGERY         Family History   Problem Relation Age of Onset   • Hypertension Father    • Coronary artery disease Father    • Hypertension Mother    • Diabetes Mother    • Diabetes Paternal Grandfather        Social History     Social History   • Marital status:      Spouse name: N/A   • Number of children: N/A   • Years of education: N/A     Occupational History   • Not on file.     Social History Main Topics   • Smoking status: Current Every Day Smoker      Packs/day: 0.50   • Smokeless tobacco: Never Used   • Alcohol use No   • Drug use: No   • Sexual activity: Yes     Partners: Male     Birth control/ protection: None     Other Topics Concern   • Not on file     Social History Narrative   • No narrative on file

## 2018-05-31 NOTE — PATIENT INSTRUCTIONS
Heartburn During Pregnancy    Heartburn happens when stomach acid goes up into the esophagus. The esophagus is the tube between the mouth and the stomach. This acid causes a burning pain in the chest or throat. This happens more often in the later part of pregnancy because the womb (uterus) gets larger. It may also happen because of hormone changes. Heartburn problems often go away after giving birth.  HOME CARE  · Take all medicine as told by your doctor.  · Raise the head of your bed with blocks only as told by your doctor.  · Do not exercise right after eating.  · Avoid eating 2-3 hours before bed. Do not lie down right after eating.  · Eat small meals throughout the day instead of 3 large meals.  · Avoid foods that give you heartburn. Foods you may want to avoid include:  ¨ Peppers.  ¨ Chocolate.  ¨ High-fat foods, including fried foods.  ¨ Spicy foods.  ¨ Garlic and onions.  ¨ Citrus fruits, including oranges, grapefruit, fritz, and limes.  ¨ Food containing tomatoes or tomato products.  ¨ Mint.  ¨ Bubbly (carbonated) drinks and drinks with caffeine.  ¨ Vinegar.  GET HELP IF:  · You have any belly (abdominal) pain.  · You feel burning in your upper belly or chest, especially after eating or lying down.  · You feel sick to your stomach (nauseous) and throw up (vomit).  · Your stomach feels upset after you eat.  GET HELP RIGHT AWAY IF:  · You have bad chest pain that goes down your arm or into your jaw or neck.  · You feel sweaty, dizzy, or light-headed.  · You have trouble breathing.  · You throw up blood.  · You have trouble or pain when swallowing.  · You have bloody or black poop (stool).  · You have heartburn more than 3 times a week, for more than 2 weeks.  MAKE SURE YOU:  · Understand these instructions.  · Will watch your condition.  · Will get help right away if you are not doing well or get worse.     This information is not intended to replace advice given to you by your health care provider. Make  sure you discuss any questions you have with your health care provider.

## 2018-06-21 ENCOUNTER — TELEPHONE (OUTPATIENT)
Dept: OBSTETRICS AND GYNECOLOGY | Facility: CLINIC | Age: 28
End: 2018-06-21

## 2018-06-21 NOTE — TELEPHONE ENCOUNTER
----- Message from Kendal Chaves sent at 6/21/2018  1:17 PM EDT -----  Contact: PT  PT IS PREGNANT AND CALLED REQUESTING REFILLS ON ZANTAC AND PROTONIX.  SHE USES [facility] IN [place].  THANKS

## 2018-06-22 RX ORDER — RANITIDINE 150 MG/1
150 TABLET ORAL 2 TIMES DAILY
Qty: 60 TABLET | Refills: 2 | Status: SHIPPED | OUTPATIENT
Start: 2018-06-22 | End: 2018-06-28 | Stop reason: HOSPADM

## 2018-06-27 ENCOUNTER — ROUTINE PRENATAL (OUTPATIENT)
Dept: OBSTETRICS AND GYNECOLOGY | Facility: CLINIC | Age: 28
End: 2018-06-27

## 2018-06-27 ENCOUNTER — HOSPITAL ENCOUNTER (OUTPATIENT)
Facility: HOSPITAL | Age: 28
Setting detail: OBSERVATION
Discharge: HOME | End: 2018-06-28
Attending: MIDWIFE | Admitting: OBSTETRICS & GYNECOLOGY

## 2018-06-27 VITALS — SYSTOLIC BLOOD PRESSURE: 134 MMHG | DIASTOLIC BLOOD PRESSURE: 70 MMHG | BODY MASS INDEX: 29.79 KG/M2 | WEIGHT: 179 LBS

## 2018-06-27 DIAGNOSIS — Z34.92 NORMAL PREGNANCY, SECOND TRIMESTER: Primary | ICD-10-CM

## 2018-06-27 PROBLEM — Z34.90 PREGNANT AND NOT YET DELIVERED: Status: ACTIVE | Noted: 2018-06-27

## 2018-06-27 LAB
BACTERIA UR QL AUTO: ABNORMAL /HPF
BILIRUB UR QL STRIP: ABNORMAL
CLARITY UR: ABNORMAL
COLOR UR: YELLOW
GLUCOSE UR STRIP-MCNC: NEGATIVE MG/DL
HGB UR QL STRIP.AUTO: ABNORMAL
HYALINE CASTS UR QL AUTO: ABNORMAL /LPF
KETONES UR QL STRIP: ABNORMAL
LEUKOCYTE ESTERASE UR QL STRIP.AUTO: NEGATIVE
MUCOUS THREADS URNS QL MICRO: ABNORMAL /HPF
NITRITE UR QL STRIP: NEGATIVE
PH UR STRIP.AUTO: 6 [PH] (ref 5–8)
PROT UR QL STRIP: ABNORMAL
RBC # UR: ABNORMAL /HPF
REF LAB TEST METHOD: ABNORMAL
SP GR UR STRIP: >=1.03 (ref 1–1.03)
SQUAMOUS #/AREA URNS HPF: ABNORMAL /HPF
UROBILINOGEN UR QL STRIP: ABNORMAL
WBC UR QL AUTO: ABNORMAL /HPF

## 2018-06-27 PROCEDURE — 25010000002 METOCLOPRAMIDE PER 10 MG: Performed by: MIDWIFE

## 2018-06-27 PROCEDURE — G0378 HOSPITAL OBSERVATION PER HR: HCPCS

## 2018-06-27 PROCEDURE — 81001 URINALYSIS AUTO W/SCOPE: CPT | Performed by: NURSE PRACTITIONER

## 2018-06-27 PROCEDURE — 96376 TX/PRO/DX INJ SAME DRUG ADON: CPT

## 2018-06-27 PROCEDURE — 96361 HYDRATE IV INFUSION ADD-ON: CPT

## 2018-06-27 PROCEDURE — 59025 FETAL NON-STRESS TEST: CPT

## 2018-06-27 PROCEDURE — 25010000002 ONDANSETRON PER 1 MG: Performed by: MIDWIFE

## 2018-06-27 PROCEDURE — G0379 DIRECT REFER HOSPITAL OBSERV: HCPCS

## 2018-06-27 PROCEDURE — 87086 URINE CULTURE/COLONY COUNT: CPT | Performed by: NURSE PRACTITIONER

## 2018-06-27 PROCEDURE — 25010000002 PROMETHAZINE PER 50 MG: Performed by: NURSE PRACTITIONER

## 2018-06-27 PROCEDURE — 96375 TX/PRO/DX INJ NEW DRUG ADDON: CPT

## 2018-06-27 PROCEDURE — G0463 HOSPITAL OUTPT CLINIC VISIT: HCPCS

## 2018-06-27 PROCEDURE — 96374 THER/PROPH/DIAG INJ IV PUSH: CPT

## 2018-06-27 PROCEDURE — 99214 OFFICE O/P EST MOD 30 MIN: CPT | Performed by: NURSE PRACTITIONER

## 2018-06-27 RX ORDER — ONDANSETRON 2 MG/ML
4 INJECTION INTRAMUSCULAR; INTRAVENOUS EVERY 6 HOURS PRN
Status: DISCONTINUED | OUTPATIENT
Start: 2018-06-27 | End: 2018-06-28

## 2018-06-27 RX ORDER — PROMETHAZINE HYDROCHLORIDE 25 MG/ML
12.5 INJECTION, SOLUTION INTRAMUSCULAR; INTRAVENOUS EVERY 6 HOURS PRN
Status: DISCONTINUED | OUTPATIENT
Start: 2018-06-27 | End: 2018-06-28 | Stop reason: HOSPADM

## 2018-06-27 RX ORDER — SODIUM CHLORIDE, SODIUM LACTATE, POTASSIUM CHLORIDE, CALCIUM CHLORIDE 600; 310; 30; 20 MG/100ML; MG/100ML; MG/100ML; MG/100ML
125 INJECTION, SOLUTION INTRAVENOUS CONTINUOUS
Status: DISCONTINUED | OUTPATIENT
Start: 2018-06-27 | End: 2018-06-28 | Stop reason: HOSPADM

## 2018-06-27 RX ORDER — FAMOTIDINE 10 MG/ML
20 INJECTION, SOLUTION INTRAVENOUS ONCE
Status: COMPLETED | OUTPATIENT
Start: 2018-06-27 | End: 2018-06-27

## 2018-06-27 RX ORDER — METOCLOPRAMIDE HYDROCHLORIDE 5 MG/ML
10 INJECTION INTRAMUSCULAR; INTRAVENOUS EVERY 6 HOURS
Status: DISCONTINUED | OUTPATIENT
Start: 2018-06-27 | End: 2018-06-28 | Stop reason: HOSPADM

## 2018-06-27 RX ORDER — ONDANSETRON 4 MG/1
TABLET, ORALLY DISINTEGRATING ORAL
Refills: 2 | COMMUNITY
Start: 2018-06-23 | End: 2018-06-28 | Stop reason: HOSPADM

## 2018-06-27 RX ADMIN — PROMETHAZINE HYDROCHLORIDE 12.5 MG: 25 INJECTION, SOLUTION INTRAMUSCULAR; INTRAVENOUS at 15:14

## 2018-06-27 RX ADMIN — SODIUM CHLORIDE, POTASSIUM CHLORIDE, SODIUM LACTATE AND CALCIUM CHLORIDE 125 ML/HR: 600; 310; 30; 20 INJECTION, SOLUTION INTRAVENOUS at 16:13

## 2018-06-27 RX ADMIN — FAMOTIDINE 20 MG: 10 INJECTION INTRAVENOUS at 15:28

## 2018-06-27 RX ADMIN — METOCLOPRAMIDE 10 MG: 5 INJECTION, SOLUTION INTRAMUSCULAR; INTRAVENOUS at 21:53

## 2018-06-27 RX ADMIN — PROMETHAZINE HYDROCHLORIDE 12.5 MG: 25 INJECTION, SOLUTION INTRAMUSCULAR; INTRAVENOUS at 21:33

## 2018-06-27 RX ADMIN — ONDANSETRON 4 MG: 2 INJECTION, SOLUTION INTRAMUSCULAR; INTRAVENOUS at 21:49

## 2018-06-27 RX ADMIN — SODIUM CHLORIDE, POTASSIUM CHLORIDE, SODIUM LACTATE AND CALCIUM CHLORIDE 999 ML/HR: 600; 310; 30; 20 INJECTION, SOLUTION INTRAVENOUS at 15:06

## 2018-06-27 RX ADMIN — SODIUM CHLORIDE, POTASSIUM CHLORIDE, SODIUM LACTATE AND CALCIUM CHLORIDE 125 ML/HR: 600; 310; 30; 20 INJECTION, SOLUTION INTRAVENOUS at 21:58

## 2018-06-27 NOTE — PROGRESS NOTES
[id number]  Chief Complaint   Patient presents with   • Routine Prenatal Visit     pt stats she has not been able to eat anything since last Thursday, +protein and very large ketones on urine dip, seen in [place] ED last week         HPI  , 23w5d reports feeling bad - no BM since Sat.  N/V - can't keep anything down.      ROS  /70   Wt 81.2 kg (179 lb)   LMP 2018 (Exact Date)   BMI 29.79 kg/m²  -See Prenatal Assessment    ROS:  GI: Nausea - YES; Constipation - YES; Diarrhea - No    Neuro: Headache - No; Visual change - No      EXAM  General Appearance: ill, tired   Skin: dry   Lungs: Breathing unlabored  Abdomen:  Soft and non-tender  See flow sheet for Fundal ht, FM, FHT's  LE: Neg edema - dry skin - scabs on right leg    MDM  Impression:  Problems/Risk Pregnancy with Active Problems(s) &/or Complication(s)  n/v/d   Proteinuria  Ketonuria   Dehydration    Tests done today: none   Topics discussed: to  for IVF's & observation - possible labs - further evaluation after arrival  encouraged questions - call prn    Tests next visit: 1 hr. glucola & CBC  antibody screen     OB History      Para Term  AB Living    2 1 1     1    SAB TAB Ectopic Molar Multiple Live Births              1          Past Medical History:   Diagnosis Date   • Acid reflux    • Anemia        Past Surgical History:   Procedure Laterality Date   • MANDIBLE SURGERY         Family History   Problem Relation Age of Onset   • Hypertension Father    • Coronary artery disease Father    • Hypertension Mother    • Diabetes Mother    • Diabetes Paternal Grandfather        Social History     Social History   • Marital status:      Spouse name: N/A   • Number of children: N/A   • Years of education: N/A     Occupational History   • Not on file.     Social History Main Topics   • Smoking status: Current Every Day Smoker     Packs/day: 0.50   • Smokeless tobacco: Never Used   • Alcohol use No   • Drug use: No   • Sexual  activity: Yes     Partners: Male     Birth control/ protection: None     Other Topics Concern   • Not on file     Social History Narrative   • No narrative on file

## 2018-06-28 VITALS
HEIGHT: 65 IN | SYSTOLIC BLOOD PRESSURE: 131 MMHG | WEIGHT: 179 LBS | RESPIRATION RATE: 18 BRPM | HEART RATE: 68 BPM | DIASTOLIC BLOOD PRESSURE: 65 MMHG | TEMPERATURE: 98.1 F | BODY MASS INDEX: 29.82 KG/M2 | OXYGEN SATURATION: 94 %

## 2018-06-28 PROBLEM — O21.0 HYPEREMESIS AFFECTING PREGNANCY, ANTEPARTUM: Status: ACTIVE | Noted: 2018-06-28

## 2018-06-28 LAB
BILIRUB BLD-MCNC: NEGATIVE MG/DL
CLARITY, POC: CLEAR
COLOR UR: ABNORMAL
GLUCOSE UR STRIP-MCNC: NEGATIVE MG/DL
KETONES UR QL: ABNORMAL
LEUKOCYTE EST, POC: NEGATIVE
NITRITE UR-MCNC: NEGATIVE MG/ML
PH UR: 6 [PH] (ref 5–8)
PROT UR STRIP-MCNC: ABNORMAL MG/DL
RBC # UR STRIP: ABNORMAL /UL
SP GR UR: 1.03 (ref 1–1.03)
UROBILINOGEN UR QL: NORMAL

## 2018-06-28 PROCEDURE — G0378 HOSPITAL OBSERVATION PER HR: HCPCS

## 2018-06-28 PROCEDURE — 99217 PR OBSERVATION CARE DISCHARGE MANAGEMENT: CPT | Performed by: NURSE PRACTITIONER

## 2018-06-28 PROCEDURE — 25010000002 PROMETHAZINE PER 50 MG: Performed by: NURSE PRACTITIONER

## 2018-06-28 PROCEDURE — 25010000002 ONDANSETRON PER 1 MG: Performed by: MIDWIFE

## 2018-06-28 PROCEDURE — 25010000002 METOCLOPRAMIDE PER 10 MG: Performed by: MIDWIFE

## 2018-06-28 PROCEDURE — 96361 HYDRATE IV INFUSION ADD-ON: CPT

## 2018-06-28 PROCEDURE — 81002 URINALYSIS NONAUTO W/O SCOPE: CPT | Performed by: NURSE PRACTITIONER

## 2018-06-28 PROCEDURE — 96376 TX/PRO/DX INJ SAME DRUG ADON: CPT

## 2018-06-28 RX ORDER — FAMOTIDINE 20 MG/1
20 TABLET, FILM COATED ORAL 2 TIMES DAILY
Status: DISCONTINUED | OUTPATIENT
Start: 2018-06-28 | End: 2018-06-28 | Stop reason: HOSPADM

## 2018-06-28 RX ORDER — ONDANSETRON 4 MG/1
4 TABLET, FILM COATED ORAL EVERY 6 HOURS PRN
Status: DISCONTINUED | OUTPATIENT
Start: 2018-06-28 | End: 2018-06-28 | Stop reason: HOSPADM

## 2018-06-28 RX ORDER — SODIUM CHLORIDE, SODIUM LACTATE, POTASSIUM CHLORIDE, CALCIUM CHLORIDE 600; 310; 30; 20 MG/100ML; MG/100ML; MG/100ML; MG/100ML
999 INJECTION, SOLUTION INTRAVENOUS ONCE
Status: COMPLETED | OUTPATIENT
Start: 2018-06-28 | End: 2018-06-28

## 2018-06-28 RX ORDER — ONDANSETRON 4 MG/1
4 TABLET, ORALLY DISINTEGRATING ORAL EVERY 6 HOURS PRN
Qty: 24 TABLET | Refills: 0 | Status: SHIPPED | OUTPATIENT
Start: 2018-06-28 | End: 2018-06-29 | Stop reason: SDUPTHER

## 2018-06-28 RX ADMIN — SODIUM CHLORIDE, POTASSIUM CHLORIDE, SODIUM LACTATE AND CALCIUM CHLORIDE 999 ML/HR: 600; 310; 30; 20 INJECTION, SOLUTION INTRAVENOUS at 14:41

## 2018-06-28 RX ADMIN — ONDANSETRON 4 MG: 4 TABLET, FILM COATED ORAL at 15:10

## 2018-06-28 RX ADMIN — FAMOTIDINE 20 MG: 20 TABLET ORAL at 08:52

## 2018-06-28 RX ADMIN — METOCLOPRAMIDE 10 MG: 5 INJECTION, SOLUTION INTRAMUSCULAR; INTRAVENOUS at 11:06

## 2018-06-28 RX ADMIN — METOCLOPRAMIDE 10 MG: 5 INJECTION, SOLUTION INTRAMUSCULAR; INTRAVENOUS at 04:07

## 2018-06-28 RX ADMIN — PROMETHAZINE HYDROCHLORIDE 12.5 MG: 25 INJECTION, SOLUTION INTRAMUSCULAR; INTRAVENOUS at 03:12

## 2018-06-28 RX ADMIN — ONDANSETRON 4 MG: 2 INJECTION, SOLUTION INTRAMUSCULAR; INTRAVENOUS at 08:25

## 2018-06-29 ENCOUNTER — TELEPHONE (OUTPATIENT)
Dept: OBSTETRICS AND GYNECOLOGY | Facility: CLINIC | Age: 28
End: 2018-06-29

## 2018-06-29 LAB — BACTERIA SPEC AEROBE CULT: NORMAL

## 2018-06-29 RX ORDER — ONDANSETRON 4 MG/1
4 TABLET, ORALLY DISINTEGRATING ORAL EVERY 6 HOURS PRN
Qty: 24 TABLET | Refills: 0 | Status: SHIPPED | OUTPATIENT
Start: 2018-06-29 | End: 2018-07-19 | Stop reason: SDUPTHER

## 2018-06-29 NOTE — TELEPHONE ENCOUNTER
----- Message from Kendal Chaves sent at 6/29/2018  1:13 PM EDT -----  Contact: PT  PT WAS SEEN ON [facility] LAST NIGHT.  HER ZOFRAN RX WAS SENT TO [facility], BUT IT WAS CLOSED BY THE TIME PT WAS DISCHARGED.  SHE CALLED REQUESTING WE SEND IT TO [facility] IN [place].  THANKS

## 2018-07-11 ENCOUNTER — ROUTINE PRENATAL (OUTPATIENT)
Dept: OBSTETRICS AND GYNECOLOGY | Facility: CLINIC | Age: 28
End: 2018-07-11

## 2018-07-11 VITALS — SYSTOLIC BLOOD PRESSURE: 126 MMHG | BODY MASS INDEX: 30.12 KG/M2 | DIASTOLIC BLOOD PRESSURE: 72 MMHG | WEIGHT: 181 LBS

## 2018-07-11 DIAGNOSIS — Z13.1 SCREENING FOR DIABETES MELLITUS: ICD-10-CM

## 2018-07-11 DIAGNOSIS — Z34.92 ENCOUNTER FOR SUPERVISION OF LOW-RISK PREGNANCY IN SECOND TRIMESTER: Primary | ICD-10-CM

## 2018-07-11 LAB
ERYTHROCYTE [DISTWIDTH] IN BLOOD BY AUTOMATED COUNT: 13.8 % (ref 11.5–14.5)
GLUCOSE 1H P 50 G GLC PO SERPL-MCNC: 98 MG/DL
HCT VFR BLD AUTO: 36.4 % (ref 37–47)
HGB BLD-MCNC: 12 G/DL (ref 12–16)
MCH RBC QN AUTO: 33.1 PG (ref 27–31)
MCHC RBC AUTO-ENTMCNC: 33 G/DL (ref 30–37)
MCV RBC AUTO: 100.3 FL (ref 81–99)
PLATELET # BLD AUTO: 275 10*3/MM3 (ref 130–400)
RBC # BLD AUTO: 3.63 10*6/MM3 (ref 4.2–5.4)
WBC # BLD AUTO: 9.04 10*3/MM3 (ref 4.8–10.8)

## 2018-07-11 PROCEDURE — 99212 OFFICE O/P EST SF 10 MIN: CPT | Performed by: MIDWIFE

## 2018-07-11 NOTE — PROGRESS NOTES
Chief Complaint   Patient presents with   • Routine Prenatal Visit     joe today, no complaints        HPI: Suzette is a  currently at 25w5d who today reports the following:   Leaking - No; Heartburn - No. Baby is active.    ROS:   GI:   Nausea - No; Constipation - No;    Neuro:  Headache - No; Visual disturbances - No.    EXAM:   Vitals:  See prenatal flowsheet, /72, Wt +2#   Abdomen:   See prenatal flowsheet, soft, nontender   Pelvic:  See prenatal flowsheet   Urine:  See prenatal flowsheet    Lab Results   Component Value Date    ABO O 2018    RH Positive 2018    ABSCRN Negative 2018       MDM:  Impression: Supervision of low risk pregnancy   Tests done today: GCT  Repeat Rubella titre   Topics discussed: kick counts and fetal movement   Tests next visit: none                RTO:                        4 weeks    This note was electronically signed.  Tesha Salazar, ARIE  2018

## 2018-07-12 PROBLEM — Z28.39 RUBELLA NON-IMMUNE STATUS, ANTEPARTUM: Status: ACTIVE | Noted: 2018-07-11

## 2018-07-12 PROBLEM — O09.899 RUBELLA NON-IMMUNE STATUS, ANTEPARTUM: Status: ACTIVE | Noted: 2018-07-11

## 2018-07-12 LAB — RUBV IGG SERPL IA-ACNC: <0.9 INDEX

## 2018-07-19 ENCOUNTER — TELEPHONE (OUTPATIENT)
Dept: OBSTETRICS AND GYNECOLOGY | Facility: CLINIC | Age: 28
End: 2018-07-19

## 2018-07-19 RX ORDER — ONDANSETRON 4 MG/1
4 TABLET, ORALLY DISINTEGRATING ORAL EVERY 6 HOURS PRN
Qty: 20 TABLET | Refills: 0 | Status: SHIPPED | OUTPATIENT
Start: 2018-07-19 | End: 2018-08-01 | Stop reason: SDUPTHER

## 2018-07-19 NOTE — TELEPHONE ENCOUNTER
----- Message from Kendal Chaves sent at 7/19/2018 11:20 AM EDT -----  Contact: PT  PT IS PREGNANT AND CALLED REQUESTING REFILL ON ZOFRAN.  SHE USES [facility] IN [place].  THANKS

## 2018-08-01 ENCOUNTER — ROUTINE PRENATAL (OUTPATIENT)
Dept: OBSTETRICS AND GYNECOLOGY | Facility: CLINIC | Age: 28
End: 2018-08-01

## 2018-08-01 VITALS — DIASTOLIC BLOOD PRESSURE: 66 MMHG | BODY MASS INDEX: 30.12 KG/M2 | SYSTOLIC BLOOD PRESSURE: 128 MMHG | WEIGHT: 181 LBS

## 2018-08-01 DIAGNOSIS — Z34.93 NORMAL PREGNANCY, THIRD TRIMESTER: Primary | ICD-10-CM

## 2018-08-01 PROCEDURE — 99213 OFFICE O/P EST LOW 20 MIN: CPT | Performed by: NURSE PRACTITIONER

## 2018-08-01 RX ORDER — RANITIDINE 150 MG/1
150 TABLET ORAL 2 TIMES DAILY
Refills: 2 | COMMUNITY
Start: 2018-07-24 | End: 2018-09-24 | Stop reason: SDUPTHER

## 2018-08-01 RX ORDER — PRENATAL VIT/IRON FUM/FOLIC AC 27MG-0.8MG
1 TABLET ORAL DAILY
COMMUNITY
End: 2018-11-29

## 2018-08-01 RX ORDER — ONDANSETRON 4 MG/1
4 TABLET, ORALLY DISINTEGRATING ORAL EVERY 6 HOURS PRN
Qty: 20 TABLET | Refills: 0 | Status: SHIPPED | OUTPATIENT
Start: 2018-08-01 | End: 2018-08-09 | Stop reason: SDUPTHER

## 2018-08-01 NOTE — PROGRESS NOTES
Chief Complaint   Patient presents with   • Routine Prenatal Visit     no complaints, needs refill on nausea meds.         HPI  , 28w5d reports doing well - still difficulty eating and tolerating without zofran - request refill  Baby is moving good     ROS  /66   Wt 82.1 kg (181 lb)   LMP 2018 (Exact Date)   BMI 30.12 kg/m²  -See Prenatal Assessment    ROS:  GI: Nausea - YES; Constipation - No; Diarrhea - No    Neuro: Headache - No; Visual change - No      EXAM  General Appearance:  Lungs: Breathing unlabored  Abdomen:  See flow sheet for Fundal ht, FM, FHT's  LE: Neg edema    MDM  Impression:  Problems/Risks: Pregnancy with Active Problems(s) &/or Complication(s)  Prolong nausea    Tests done today:     Topics discussed: continue to note good FM  encouraged small frequent healthy meals  - encourage zofran only when needed  encouraged questions - call prn    Tests next visit: none     OB History      Para Term  AB Living    2 1 1     1    SAB TAB Ectopic Molar Multiple Live Births              1          Past Medical History:   Diagnosis Date   • Acid reflux    • Anemia    • Rubella non-immune status, antepartum 2018       Past Surgical History:   Procedure Laterality Date   • MANDIBLE SURGERY         Family History   Problem Relation Age of Onset   • Hypertension Father    • Coronary artery disease Father    • Hypertension Mother    • Diabetes Mother    • Diabetes Paternal Grandfather        Social History     Social History   • Marital status:      Spouse name: N/A   • Number of children: N/A   • Years of education: N/A     Occupational History   • Not on file.     Social History Main Topics   • Smoking status: Current Every Day Smoker     Packs/day: 0.50   • Smokeless tobacco: Never Used   • Alcohol use No   • Drug use: No   • Sexual activity: Yes     Partners: Male     Birth control/ protection: None     Other Topics Concern   • Not on file     Social History  Narrative   • No narrative on file

## 2018-08-09 ENCOUNTER — TELEPHONE (OUTPATIENT)
Dept: OBSTETRICS AND GYNECOLOGY | Facility: CLINIC | Age: 28
End: 2018-08-09

## 2018-08-09 RX ORDER — ONDANSETRON 4 MG/1
4 TABLET, ORALLY DISINTEGRATING ORAL EVERY 6 HOURS PRN
Qty: 20 TABLET | Refills: 0 | Status: SHIPPED | OUTPATIENT
Start: 2018-08-09 | End: 2018-08-20 | Stop reason: SDUPTHER

## 2018-08-09 NOTE — TELEPHONE ENCOUNTER
----- Message from Kendal Chaves sent at 8/9/2018 10:03 AM EDT -----  Contact: PT  PT IS PREGNANT AND CALLED REQUESTING REFILL ON ZOFRAN.  SHE USES [facility] IN [place].  THANKS

## 2018-08-15 ENCOUNTER — ROUTINE PRENATAL (OUTPATIENT)
Dept: OBSTETRICS AND GYNECOLOGY | Facility: CLINIC | Age: 28
End: 2018-08-15

## 2018-08-15 VITALS — DIASTOLIC BLOOD PRESSURE: 74 MMHG | WEIGHT: 182.8 LBS | SYSTOLIC BLOOD PRESSURE: 122 MMHG | BODY MASS INDEX: 30.42 KG/M2

## 2018-08-15 DIAGNOSIS — Z34.93 PRENATAL CARE IN THIRD TRIMESTER: Primary | ICD-10-CM

## 2018-08-15 PROCEDURE — 99213 OFFICE O/P EST LOW 20 MIN: CPT | Performed by: OBSTETRICS & GYNECOLOGY

## 2018-08-15 NOTE — PROGRESS NOTES
Chief Complaint   Patient presents with   • Routine Prenatal Visit     Doing well; no complaints       HPI:   Suzette is a  currently at 30w5d who today reports the following:  Contractions - No; Leaking - No; Vaginal bleeding -  No; Swelling of extremities - No. Good fetal movement - YES.    Smoking status: Current Every Day Smoker                                                   Packs/day: 0.50      Years: 0.00      Smokeless tobacco: Never Used                          ROS:  GI: Nausea - No; Constipation - No; Diarrhea - No. RUQ pain - No    Neuro: Headache - No; Visual disturbances - No.    EXAM:  /74   Wt 82.9 kg (182 lb 12.8 oz)   LMP 2018 (Exact Date)   BMI 30.42 kg/m²     Gen: NAD  Pulm: No use of accessory muscles, normal respirations  Abdomen: Gravid, nontender, size = dates, + fetal cardiac activity  Ext: no edema, no rashes, WWP  Gait: normal for pregnancy  Psych: Mood, insight, judgement intact  SVE: Not performed     Hemoglobin   Date Value Ref Range Status   2018 12.0 12.0 - 16.0 g/dL Final   2018 14.5 12.0 - 16.0 g/dL Final     Hematocrit   Date Value Ref Range Status   2018 36.4 (L) 37.0 - 47.0 % Final   2018 40.7 37.0 - 47.0 % Final     Platelets   Date Value Ref Range Status   2018 275 130 - 400 10*3/mm3 Final   2018 279 130 - 400 10*3/mm3 Final     Rubella Antibodies, IgG   Date Value Ref Range Status   2018 <0.90 (L) Immune >0.99 index Final     Comment:                                     Non-immune       <0.90                                  Equivocal  0.90 - 0.99                                  Immune           >0.99       Hepatitis B Surface Ag   Date Value Ref Range Status   2018 Negative Negative Final     RPR   Date Value Ref Range Status   2018 Non Reactive Non Reactive Final     ABO Type   Date Value Ref Range Status   2018 O  Final     Rh Factor   Date Value Ref Range Status   2018 Positive  Final      Comment:     Please note: Prior records for this patient's ABO / Rh type are not  available for additional verification.       Antibody Screen   Date Value Ref Range Status   2018 Negative Negative Final     HIV Screen 4th Gen w/RFX (Reference)   Date Value Ref Range Status   2018 Non Reactive Non Reactive Final     Urine Culture   Date Value Ref Range Status   2018 Mixed Mamta Isolated  Final     Neisseria gonorrhoeae, ANSON   Date Value Ref Range Status   2018 Negative  Final     Chlamydia trachomatis, ANSON   Date Value Ref Range Status   2018 Negative  Final     TSH   Date Value Ref Range Status   2018 1.510 0.470 - 4.680 mIU/mL Final     Gestational Diabetes Screen   Date Value Ref Range Status   2018 98 mg/dL Final       No results found for: AMPHETSCREEN, BARBITSCNUR, LABMETHSCN, PCPUR, OSMOLALITY, THCSCNINP, COCAINEUR, PROPOXSCN, OXYCODONESCN, TCA    MDM:  Impression: Supervision of low risk pregnancy   Tests done today: none   Topics discussed: kick counts and fetal movement  PIH precautions   labor signs and symptoms  tobacco use  LARCs   Tests next visit: U/S for EFW   Next visit: 2 weeks     Steffen Cooley MD  Obstetrics and Gynecology  [facility]

## 2018-08-20 ENCOUNTER — TELEPHONE (OUTPATIENT)
Dept: OBSTETRICS AND GYNECOLOGY | Facility: CLINIC | Age: 28
End: 2018-08-20

## 2018-08-20 RX ORDER — ONDANSETRON 4 MG/1
4 TABLET, ORALLY DISINTEGRATING ORAL EVERY 6 HOURS PRN
Qty: 20 TABLET | Refills: 0 | Status: SHIPPED | OUTPATIENT
Start: 2018-08-20 | End: 2018-08-31 | Stop reason: SDUPTHER

## 2018-08-20 NOTE — TELEPHONE ENCOUNTER
I'm not sure if insurance will refill or not because she just had it filled on 8/9 according to the chart. She can have # 20 to use sparingly.

## 2018-08-20 NOTE — TELEPHONE ENCOUNTER
----- Message from Cesar Linda sent at 8/20/2018  9:04 AM EDT -----  Contact: patient  Patient called requesting a refill on zofran.    [facility].

## 2018-08-29 ENCOUNTER — ROUTINE PRENATAL (OUTPATIENT)
Dept: OBSTETRICS AND GYNECOLOGY | Facility: CLINIC | Age: 28
End: 2018-08-29

## 2018-08-29 VITALS — DIASTOLIC BLOOD PRESSURE: 62 MMHG | WEIGHT: 185 LBS | BODY MASS INDEX: 30.79 KG/M2 | SYSTOLIC BLOOD PRESSURE: 128 MMHG

## 2018-08-29 DIAGNOSIS — O99.333 TOBACCO SMOKING AFFECTING PREGNANCY IN THIRD TRIMESTER: ICD-10-CM

## 2018-08-29 DIAGNOSIS — Z34.93 PRENATAL CARE IN THIRD TRIMESTER: Primary | ICD-10-CM

## 2018-08-29 PROCEDURE — 99213 OFFICE O/P EST LOW 20 MIN: CPT | Performed by: OBSTETRICS & GYNECOLOGY

## 2018-08-30 ENCOUNTER — TELEPHONE (OUTPATIENT)
Dept: OBSTETRICS AND GYNECOLOGY | Facility: CLINIC | Age: 28
End: 2018-08-30

## 2018-08-31 DIAGNOSIS — O21.9 NAUSEA AND VOMITING DURING PREGNANCY: Primary | ICD-10-CM

## 2018-08-31 RX ORDER — ONDANSETRON 4 MG/1
4 TABLET, ORALLY DISINTEGRATING ORAL EVERY 6 HOURS PRN
Qty: 30 TABLET | Refills: 3 | Status: SHIPPED | OUTPATIENT
Start: 2018-08-31 | End: 2018-10-07 | Stop reason: HOSPADM

## 2018-09-13 ENCOUNTER — ROUTINE PRENATAL (OUTPATIENT)
Dept: OBSTETRICS AND GYNECOLOGY | Facility: CLINIC | Age: 28
End: 2018-09-13

## 2018-09-13 VITALS — BODY MASS INDEX: 31.62 KG/M2 | WEIGHT: 190 LBS | SYSTOLIC BLOOD PRESSURE: 120 MMHG | DIASTOLIC BLOOD PRESSURE: 62 MMHG

## 2018-09-13 DIAGNOSIS — Z34.93 PRENATAL CARE IN THIRD TRIMESTER: Primary | ICD-10-CM

## 2018-09-13 PROCEDURE — 99213 OFFICE O/P EST LOW 20 MIN: CPT | Performed by: OBSTETRICS & GYNECOLOGY

## 2018-09-13 NOTE — PROGRESS NOTES
Chief Complaint   Patient presents with   • Routine Prenatal Visit     NO COMPLAINTS       HPI:   Suzette is a  currently at 34w6d who today reports the following:  Contractions - No; Leaking - No; Vaginal bleeding -  No; Swelling of extremities - No. Good fetal movement - YES.    ROS:  GI: Nausea - No; Constipation - No; Diarrhea - No. RUQ pain - No    Neuro: Headache - No; Visual disturbances - No.    Pertinent items are noted in HPI, all other systems reviewed and negative    Review of History:  The following portions of the patient's history were reviewed and updated as appropriate:problem list, current medications, allergies, past family history, past medical history, past social history and past surgical history.    EXAM:  /62   Wt 86.2 kg (190 lb)   LMP 2018 (Exact Date)   BMI 31.62 kg/m²     Gen: NAD, conversant  Pulm: No use of accessory muscles, normal respirations  Abdomen: Gravid, nontender, size = dates, + fetal cardiac activity  Ext: no edema, no rashes, WWP  Gait: normal for pregnancy  Psych: Mood, insight, judgement intact  SVE: Not performed     Lab Results   Component Value Date    ABO O 2018    RH Positive 2018    ABSCRN Negative 2018       Smoking status: Current Every Day Smoker                                                   Packs/day: 0.50      Years: 0.00      Smokeless tobacco: Never Used                          I have reviewed the prenatal labs and previous ultrasounds today.    MDM:  Diagnosis: Supervision of low risk pregnancy  Tobacco use in pregnancy   Tests/Orders today: No orders of the defined types were placed in this encounter.       Topics discussed: kick counts and fetal movement  PIH precautions   labor signs and symptoms  TDAP vaccination  tobacco use   Tests next visit: GBS testing   Next visit: 2 week(s)     Steffen Cooley MD  Obstetrics and Gynecology  [facility]

## 2018-09-24 ENCOUNTER — TELEPHONE (OUTPATIENT)
Dept: OBSTETRICS AND GYNECOLOGY | Facility: CLINIC | Age: 28
End: 2018-09-24

## 2018-09-24 RX ORDER — RANITIDINE 150 MG/1
150 TABLET ORAL 2 TIMES DAILY
Qty: 60 TABLET | Refills: 6 | Status: SHIPPED | OUTPATIENT
Start: 2018-09-24 | End: 2018-10-07 | Stop reason: HOSPADM

## 2018-09-24 NOTE — TELEPHONE ENCOUNTER
----- Message from Kendal Chaves sent at 9/24/2018 11:34 AM EDT -----  Contact: PT  PT IS PREGNANT AND REQUESTING REFILL ON ZANTAC.  SHE USES [facility] IN [place].  THANKS

## 2018-09-27 ENCOUNTER — ROUTINE PRENATAL (OUTPATIENT)
Dept: OBSTETRICS AND GYNECOLOGY | Facility: CLINIC | Age: 28
End: 2018-09-27

## 2018-09-27 VITALS — WEIGHT: 187 LBS | SYSTOLIC BLOOD PRESSURE: 122 MMHG | DIASTOLIC BLOOD PRESSURE: 64 MMHG | BODY MASS INDEX: 31.12 KG/M2

## 2018-09-27 DIAGNOSIS — Z34.93 PRENATAL CARE IN THIRD TRIMESTER: Primary | ICD-10-CM

## 2018-09-27 DIAGNOSIS — O99.333 TOBACCO SMOKING AFFECTING PREGNANCY IN THIRD TRIMESTER: ICD-10-CM

## 2018-09-27 DIAGNOSIS — Z3A.36 36 WEEKS GESTATION OF PREGNANCY: ICD-10-CM

## 2018-09-27 PROCEDURE — 99213 OFFICE O/P EST LOW 20 MIN: CPT | Performed by: OBSTETRICS & GYNECOLOGY

## 2018-09-27 NOTE — PROGRESS NOTES
Chief Complaint   Patient presents with   • Routine Prenatal Visit     GBS done today, no complaints       HPI:   Suzette is a  currently at 36w6d who today reports the following:  Contractions - No; Leaking - No; Vaginal bleeding -  No; Swelling of extremities - No. Good fetal movement - YES.    ROS:  GI: Nausea - No; Constipation - No; Diarrhea - No. RUQ pain - No    Neuro: Headache - No; Visual disturbances - No.    Pertinent items are noted in HPI, all other systems reviewed and negative    Review of History:  The following portions of the patient's history were reviewed and updated as appropriate:problem list, current medications, allergies, past family history, past medical history, past social history and past surgical history.    Current Outpatient Prescriptions on File Prior to Visit   Medication Sig Dispense Refill   • ondansetron ODT (ZOFRAN-ODT) 4 MG disintegrating tablet Dissolve 1 tablet by mouth Every 6 (Six) Hours As Needed for Nausea or Vomiting. 30 tablet 3   • pantoprazole (PROTONIX) 40 MG EC tablet Take 1 tablet by mouth Daily. 30 tablet 0   • Prenatal Vit-Fe Fumarate-FA (PRENATAL VITAMIN 27-0.8) 27-0.8 MG tablet tablet Take 1 tablet by mouth Daily.     • raNITIdine (ZANTAC) 150 MG tablet Take 1 tablet by mouth 2 (Two) Times a Day. 60 tablet 6     No current facility-administered medications on file prior to visit.        EXAM:  /64   Wt 84.8 kg (187 lb)   LMP 2018 (Exact Date)   BMI 31.12 kg/m²     Gen: NAD, conversant  Pulm: No use of accessory muscles, normal respirations  Abdomen: Gravid, nontender, size = dates, + fetal cardiac activity  Ext: no edema, no rashes, WWP  Gait: normal for pregnancy  Psych: Mood, insight, judgement intact  SVE: Not performed     Lab Results   Component Value Date    ABO O 2018    RH Positive 2018    ABSCRN Negative 2018       Smoking status: Current Every Day Smoker                                                    Packs/day: 0.50      Years: 0.00      Smokeless tobacco: Never Used                          I have reviewed the prenatal labs and previous ultrasounds today.    MDM:  Diagnosis: Supervision of low risk pregnancy  Tobacco use in pregnancy   Tests/Orders/Rx today: Orders Placed This Encounter   Procedures   • Strep Grp B ANSON + Reflex - Swab, Vaginal/Rectum      Topics discussed: kick counts and fetal movement  labor signs and symptoms  PIH precautions  tobacco use  BTL   Tests next visit: none   Next visit: 1 week(s)     Steffen Cooley MD  Obstetrics and Gynecology  [facility]

## 2018-10-04 ENCOUNTER — TELEPHONE (OUTPATIENT)
Dept: OBSTETRICS AND GYNECOLOGY | Facility: CLINIC | Age: 28
End: 2018-10-04

## 2018-10-04 ENCOUNTER — ROUTINE PRENATAL (OUTPATIENT)
Dept: OBSTETRICS AND GYNECOLOGY | Facility: CLINIC | Age: 28
End: 2018-10-04

## 2018-10-04 VITALS — SYSTOLIC BLOOD PRESSURE: 120 MMHG | BODY MASS INDEX: 31.78 KG/M2 | WEIGHT: 191 LBS | DIASTOLIC BLOOD PRESSURE: 68 MMHG

## 2018-10-04 DIAGNOSIS — Z34.93 PRENATAL CARE IN THIRD TRIMESTER: Primary | ICD-10-CM

## 2018-10-04 DIAGNOSIS — O99.333 TOBACCO SMOKING AFFECTING PREGNANCY IN THIRD TRIMESTER: ICD-10-CM

## 2018-10-04 DIAGNOSIS — Z3A.37 37 WEEKS GESTATION OF PREGNANCY: ICD-10-CM

## 2018-10-04 LAB — GP B STREP DNA SPEC QL NAA+PROBE: NORMAL

## 2018-10-04 PROCEDURE — 99213 OFFICE O/P EST LOW 20 MIN: CPT | Performed by: OBSTETRICS & GYNECOLOGY

## 2018-10-04 PROCEDURE — 87081 CULTURE SCREEN ONLY: CPT | Performed by: OBSTETRICS & GYNECOLOGY

## 2018-10-04 NOTE — TELEPHONE ENCOUNTER
----- Message from Kendal Chaves sent at 10/3/2018  3:48 PM EDT -----  Contact: [facility]  THERE WAS AN ACCIDENT WITH PT'S SPECIMEN AND THE LAB WASN'T ABLE TO RUN HER GROUP B STREP.  THANKS

## 2018-10-04 NOTE — TELEPHONE ENCOUNTER
Patient has appointment today, will repeat her GBS. Noted in her chart just in case she does not show

## 2018-10-05 ENCOUNTER — ANESTHESIA (OUTPATIENT)
Dept: LABOR AND DELIVERY | Facility: HOSPITAL | Age: 28
End: 2018-10-05

## 2018-10-05 ENCOUNTER — ANESTHESIA EVENT (OUTPATIENT)
Dept: LABOR AND DELIVERY | Facility: HOSPITAL | Age: 28
End: 2018-10-05

## 2018-10-05 ENCOUNTER — HOSPITAL ENCOUNTER (INPATIENT)
Facility: HOSPITAL | Age: 28
LOS: 2 days | Discharge: HOME | End: 2018-10-07
Attending: OBSTETRICS & GYNECOLOGY | Admitting: OBSTETRICS & GYNECOLOGY

## 2018-10-05 PROBLEM — Z34.90 PREGNANCY: Status: ACTIVE | Noted: 2018-10-05

## 2018-10-05 LAB
A1 MICROGLOB PLACENTAL VAG QL: POSITIVE
ABO GROUP BLD: NORMAL
ABO GROUP BLD: NORMAL
BASOPHILS # BLD AUTO: 0.06 10*3/MM3 (ref 0–0.2)
BASOPHILS NFR BLD AUTO: 0.5 % (ref 0–2.5)
BILIRUB BLD-MCNC: NEGATIVE MG/DL
BLD GP AB SCN SERPL QL: NEGATIVE
CLARITY, POC: CLEAR
COLOR UR: YELLOW
DEPRECATED RDW RBC AUTO: 45.3 FL (ref 37–54)
EOSINOPHIL # BLD AUTO: 0.31 10*3/MM3 (ref 0–0.7)
EOSINOPHIL NFR BLD AUTO: 2.8 % (ref 0–7)
ERYTHROCYTE [DISTWIDTH] IN BLOOD BY AUTOMATED COUNT: 13 % (ref 11.5–14.5)
GLUCOSE UR STRIP-MCNC: NEGATIVE MG/DL
HCT VFR BLD AUTO: 32.9 % (ref 37–47)
HGB BLD-MCNC: 11.3 G/DL (ref 12–16)
IMM GRANULOCYTES # BLD: 0.09 10*3/MM3 (ref 0–0.06)
IMM GRANULOCYTES NFR BLD: 0.8 % (ref 0–0.6)
KETONES UR QL: NEGATIVE
LEUKOCYTE EST, POC: NEGATIVE
LYMPHOCYTES # BLD AUTO: 1.83 10*3/MM3 (ref 0.6–3.4)
LYMPHOCYTES NFR BLD AUTO: 16.6 % (ref 10–50)
MCH RBC QN AUTO: 32.8 PG (ref 27–31)
MCHC RBC AUTO-ENTMCNC: 34.3 G/DL (ref 30–37)
MCV RBC AUTO: 95.4 FL (ref 81–99)
MONOCYTES # BLD AUTO: 0.76 10*3/MM3 (ref 0–0.9)
MONOCYTES NFR BLD AUTO: 6.9 % (ref 0–12)
NEUTROPHILS # BLD AUTO: 7.95 10*3/MM3 (ref 2–6.9)
NEUTROPHILS NFR BLD AUTO: 72.4 % (ref 37–80)
NITRITE UR-MCNC: NEGATIVE MG/ML
NRBC BLD MANUAL-RTO: 0 /100 WBC (ref 0–0)
PH UR: 6.5 [PH] (ref 5–8)
PLATELET # BLD AUTO: 251 10*3/MM3 (ref 130–400)
PMV BLD AUTO: 10.5 FL (ref 6–12)
PROT UR STRIP-MCNC: NEGATIVE MG/DL
RBC # BLD AUTO: 3.45 10*6/MM3 (ref 4.2–5.4)
RBC # UR STRIP: ABNORMAL /UL
RH BLD: POSITIVE
RH BLD: POSITIVE
SP GR UR: 1.02 (ref 1–1.03)
T&S EXPIRATION DATE: NORMAL
UROBILINOGEN UR QL: NORMAL
WBC NRBC COR # BLD: 11 10*3/MM3 (ref 4.8–10.8)

## 2018-10-05 PROCEDURE — 25010000002 FENTANYL CITRATE (PF) 250 MCG/5ML SOLUTION 5 ML VIAL: Performed by: NURSE ANESTHETIST, CERTIFIED REGISTERED

## 2018-10-05 PROCEDURE — 85025 COMPLETE CBC W/AUTO DIFF WBC: CPT | Performed by: OBSTETRICS & GYNECOLOGY

## 2018-10-05 PROCEDURE — 86900 BLOOD TYPING SEROLOGIC ABO: CPT

## 2018-10-05 PROCEDURE — 84112 EVAL AMNIOTIC FLUID PROTEIN: CPT | Performed by: OBSTETRICS & GYNECOLOGY

## 2018-10-05 PROCEDURE — G0463 HOSPITAL OUTPT CLINIC VISIT: HCPCS

## 2018-10-05 PROCEDURE — 25010000002 ONDANSETRON PER 1 MG: Performed by: OBSTETRICS & GYNECOLOGY

## 2018-10-05 PROCEDURE — 86901 BLOOD TYPING SEROLOGIC RH(D): CPT | Performed by: OBSTETRICS & GYNECOLOGY

## 2018-10-05 PROCEDURE — 51702 INSERT TEMP BLADDER CATH: CPT

## 2018-10-05 PROCEDURE — 86901 BLOOD TYPING SEROLOGIC RH(D): CPT

## 2018-10-05 PROCEDURE — 25010000002 PENICILLIN G POTASSIUM PER 600000 UNITS: Performed by: OBSTETRICS & GYNECOLOGY

## 2018-10-05 PROCEDURE — 0UL70CZ OCCLUSION OF BILATERAL FALLOPIAN TUBES WITH EXTRALUMINAL DEVICE, OPEN APPROACH: ICD-10-PCS | Performed by: OBSTETRICS & GYNECOLOGY

## 2018-10-05 PROCEDURE — 25010000002 ROPIVACAINE PER 1 MG: Performed by: NURSE ANESTHETIST, CERTIFIED REGISTERED

## 2018-10-05 PROCEDURE — 59410 OBSTETRICAL CARE: CPT | Performed by: OBSTETRICS & GYNECOLOGY

## 2018-10-05 PROCEDURE — 25010000002 FENTANYL CITRATE (PF) 100 MCG/2ML SOLUTION: Performed by: NURSE ANESTHETIST, CERTIFIED REGISTERED

## 2018-10-05 PROCEDURE — 86850 RBC ANTIBODY SCREEN: CPT | Performed by: OBSTETRICS & GYNECOLOGY

## 2018-10-05 PROCEDURE — 81002 URINALYSIS NONAUTO W/O SCOPE: CPT | Performed by: OBSTETRICS & GYNECOLOGY

## 2018-10-05 PROCEDURE — 86900 BLOOD TYPING SEROLOGIC ABO: CPT | Performed by: OBSTETRICS & GYNECOLOGY

## 2018-10-05 PROCEDURE — 0UQMXZZ REPAIR VULVA, EXTERNAL APPROACH: ICD-10-PCS | Performed by: OBSTETRICS & GYNECOLOGY

## 2018-10-05 PROCEDURE — 51703 INSERT BLADDER CATH COMPLEX: CPT

## 2018-10-05 PROCEDURE — 59025 FETAL NON-STRESS TEST: CPT

## 2018-10-05 RX ORDER — ONDANSETRON 2 MG/ML
4 INJECTION INTRAMUSCULAR; INTRAVENOUS ONCE AS NEEDED
Status: DISCONTINUED | OUTPATIENT
Start: 2018-10-05 | End: 2018-10-05 | Stop reason: HOSPADM

## 2018-10-05 RX ORDER — DOCUSATE SODIUM 100 MG/1
100 CAPSULE, LIQUID FILLED ORAL 2 TIMES DAILY PRN
Status: DISCONTINUED | OUTPATIENT
Start: 2018-10-05 | End: 2018-10-07 | Stop reason: HOSPADM

## 2018-10-05 RX ORDER — ZOLPIDEM TARTRATE 5 MG/1
10 TABLET ORAL NIGHTLY PRN
Status: DISCONTINUED | OUTPATIENT
Start: 2018-10-05 | End: 2018-10-07 | Stop reason: HOSPADM

## 2018-10-05 RX ORDER — NICOTINE 21 MG/24HR
1 PATCH, TRANSDERMAL 24 HOURS TRANSDERMAL
Status: DISCONTINUED | OUTPATIENT
Start: 2018-10-05 | End: 2018-10-07 | Stop reason: HOSPADM

## 2018-10-05 RX ORDER — HYDROCODONE BITARTRATE AND ACETAMINOPHEN 5; 325 MG/1; MG/1
1 TABLET ORAL EVERY 4 HOURS PRN
Status: DISCONTINUED | OUTPATIENT
Start: 2018-10-05 | End: 2018-10-07 | Stop reason: HOSPADM

## 2018-10-05 RX ORDER — MISOPROSTOL 200 UG/1
800 TABLET ORAL AS NEEDED
Status: CANCELLED | OUTPATIENT
Start: 2018-10-05 | End: 2018-10-06

## 2018-10-05 RX ORDER — PROMETHAZINE HYDROCHLORIDE 12.5 MG/1
12.5 SUPPOSITORY RECTAL ONCE AS NEEDED
Status: CANCELLED | OUTPATIENT
Start: 2018-10-05

## 2018-10-05 RX ORDER — ZOLPIDEM TARTRATE 5 MG/1
10 TABLET ORAL NIGHTLY PRN
Status: CANCELLED | OUTPATIENT
Start: 2018-10-05 | End: 2018-10-15

## 2018-10-05 RX ORDER — ACETAMINOPHEN 325 MG/1
650 TABLET ORAL EVERY 4 HOURS PRN
Status: DISCONTINUED | OUTPATIENT
Start: 2018-10-05 | End: 2018-10-07 | Stop reason: HOSPADM

## 2018-10-05 RX ORDER — METHYLERGONOVINE MALEATE 0.2 MG/ML
200 INJECTION INTRAVENOUS ONCE AS NEEDED
Status: CANCELLED | OUTPATIENT
Start: 2018-10-05 | End: 2018-10-06

## 2018-10-05 RX ORDER — ONDANSETRON 4 MG/1
4 TABLET, FILM COATED ORAL EVERY 8 HOURS PRN
Status: DISCONTINUED | OUTPATIENT
Start: 2018-10-05 | End: 2018-10-07 | Stop reason: HOSPADM

## 2018-10-05 RX ORDER — MORPHINE SULFATE 2 MG/ML
4 INJECTION, SOLUTION INTRAMUSCULAR; INTRAVENOUS EVERY 4 HOURS PRN
Status: CANCELLED | OUTPATIENT
Start: 2018-10-05 | End: 2018-10-15

## 2018-10-05 RX ORDER — PROMETHAZINE HYDROCHLORIDE 25 MG/1
25 TABLET ORAL EVERY 6 HOURS PRN
Status: DISCONTINUED | OUTPATIENT
Start: 2018-10-05 | End: 2018-10-07 | Stop reason: HOSPADM

## 2018-10-05 RX ORDER — ONDANSETRON 4 MG/1
4 TABLET, FILM COATED ORAL EVERY 6 HOURS PRN
Status: CANCELLED | OUTPATIENT
Start: 2018-10-05

## 2018-10-05 RX ORDER — HYDROCODONE BITARTRATE AND ACETAMINOPHEN 5; 325 MG/1; MG/1
1 TABLET ORAL EVERY 4 HOURS PRN
Status: CANCELLED | OUTPATIENT
Start: 2018-10-05 | End: 2018-10-15

## 2018-10-05 RX ORDER — CARBOPROST TROMETHAMINE 250 UG/ML
250 INJECTION, SOLUTION INTRAMUSCULAR AS NEEDED
Status: CANCELLED | OUTPATIENT
Start: 2018-10-05 | End: 2018-10-06

## 2018-10-05 RX ORDER — TRISODIUM CITRATE DIHYDRATE AND CITRIC ACID MONOHYDRATE 500; 334 MG/5ML; MG/5ML
30 SOLUTION ORAL ONCE
Status: DISCONTINUED | OUTPATIENT
Start: 2018-10-05 | End: 2018-10-07 | Stop reason: HOSPADM

## 2018-10-05 RX ORDER — FENTANYL CITRATE 50 UG/ML
INJECTION, SOLUTION INTRAMUSCULAR; INTRAVENOUS
Status: COMPLETED
Start: 2018-10-05 | End: 2018-10-05

## 2018-10-05 RX ORDER — PRENATAL VIT/IRON FUM/FOLIC AC 27MG-0.8MG
1 TABLET ORAL DAILY
Status: DISCONTINUED | OUTPATIENT
Start: 2018-10-06 | End: 2018-10-07 | Stop reason: HOSPADM

## 2018-10-05 RX ORDER — PROMETHAZINE HYDROCHLORIDE 12.5 MG/1
12.5 SUPPOSITORY RECTAL EVERY 6 HOURS PRN
Status: DISCONTINUED | OUTPATIENT
Start: 2018-10-05 | End: 2018-10-07 | Stop reason: HOSPADM

## 2018-10-05 RX ORDER — HYDROCODONE BITARTRATE AND ACETAMINOPHEN 5; 325 MG/1; MG/1
2 TABLET ORAL EVERY 4 HOURS PRN
Status: CANCELLED | OUTPATIENT
Start: 2018-10-05 | End: 2018-10-15

## 2018-10-05 RX ORDER — PENICILLIN G 3000000 [IU]/50ML
3 INJECTION, SOLUTION INTRAVENOUS EVERY 4 HOURS
Status: DISCONTINUED | OUTPATIENT
Start: 2018-10-05 | End: 2018-10-05 | Stop reason: HOSPADM

## 2018-10-05 RX ORDER — SODIUM CHLORIDE, SODIUM LACTATE, POTASSIUM CHLORIDE, CALCIUM CHLORIDE 600; 310; 30; 20 MG/100ML; MG/100ML; MG/100ML; MG/100ML
125 INJECTION, SOLUTION INTRAVENOUS CONTINUOUS
Status: DISCONTINUED | OUTPATIENT
Start: 2018-10-05 | End: 2018-10-05

## 2018-10-05 RX ORDER — SODIUM CHLORIDE 0.9 % (FLUSH) 0.9 %
3 SYRINGE (ML) INJECTION EVERY 12 HOURS SCHEDULED
Status: DISCONTINUED | OUTPATIENT
Start: 2018-10-05 | End: 2018-10-05 | Stop reason: HOSPADM

## 2018-10-05 RX ORDER — SODIUM CHLORIDE 0.9 % (FLUSH) 0.9 %
1-10 SYRINGE (ML) INJECTION AS NEEDED
Status: DISCONTINUED | OUTPATIENT
Start: 2018-10-05 | End: 2018-10-05 | Stop reason: HOSPADM

## 2018-10-05 RX ORDER — PROMETHAZINE HYDROCHLORIDE 25 MG/ML
12.5 INJECTION, SOLUTION INTRAMUSCULAR; INTRAVENOUS EVERY 6 HOURS PRN
Status: DISCONTINUED | OUTPATIENT
Start: 2018-10-05 | End: 2018-10-07 | Stop reason: HOSPADM

## 2018-10-05 RX ORDER — IBUPROFEN 600 MG/1
600 TABLET ORAL EVERY 6 HOURS PRN
Status: DISCONTINUED | OUTPATIENT
Start: 2018-10-05 | End: 2018-10-07 | Stop reason: HOSPADM

## 2018-10-05 RX ORDER — FENTANYL CITRATE 50 UG/ML
INJECTION, SOLUTION INTRAMUSCULAR; INTRAVENOUS AS NEEDED
Status: DISCONTINUED | OUTPATIENT
Start: 2018-10-05 | End: 2018-10-11 | Stop reason: SURG

## 2018-10-05 RX ORDER — ONDANSETRON 4 MG/1
4 TABLET, ORALLY DISINTEGRATING ORAL EVERY 6 HOURS PRN
Status: CANCELLED | OUTPATIENT
Start: 2018-10-05

## 2018-10-05 RX ORDER — ONDANSETRON 2 MG/ML
4 INJECTION INTRAMUSCULAR; INTRAVENOUS EVERY 6 HOURS PRN
Status: DISCONTINUED | OUTPATIENT
Start: 2018-10-05 | End: 2018-10-07 | Stop reason: HOSPADM

## 2018-10-05 RX ORDER — LIDOCAINE HYDROCHLORIDE 10 MG/ML
5 INJECTION, SOLUTION EPIDURAL; INFILTRATION; INTRACAUDAL; PERINEURAL AS NEEDED
Status: DISCONTINUED | OUTPATIENT
Start: 2018-10-05 | End: 2018-10-05 | Stop reason: HOSPADM

## 2018-10-05 RX ORDER — EPHEDRINE SULFATE 50 MG/ML
5 INJECTION, SOLUTION INTRAVENOUS
Status: DISCONTINUED | OUTPATIENT
Start: 2018-10-05 | End: 2018-10-05 | Stop reason: HOSPADM

## 2018-10-05 RX ORDER — LANOLIN
CREAM (GRAM) TOPICAL
Status: DISCONTINUED | OUTPATIENT
Start: 2018-10-05 | End: 2018-10-07 | Stop reason: HOSPADM

## 2018-10-05 RX ORDER — OXYTOCIN IN DEXTROSE 5 % IN LR 20/1000 ML
1000 PLASTIC BAG, INJECTION (ML) INTRAVENOUS CONTINUOUS
Status: DISCONTINUED | OUTPATIENT
Start: 2018-10-05 | End: 2018-10-05 | Stop reason: RX

## 2018-10-05 RX ORDER — ONDANSETRON 2 MG/ML
4 INJECTION INTRAMUSCULAR; INTRAVENOUS EVERY 6 HOURS PRN
Status: CANCELLED | OUTPATIENT
Start: 2018-10-05

## 2018-10-05 RX ORDER — BISACODYL 10 MG
10 SUPPOSITORY, RECTAL RECTAL DAILY PRN
Status: DISCONTINUED | OUTPATIENT
Start: 2018-10-06 | End: 2018-10-07 | Stop reason: HOSPADM

## 2018-10-05 RX ORDER — PROMETHAZINE HYDROCHLORIDE 25 MG/ML
12.5 INJECTION, SOLUTION INTRAMUSCULAR; INTRAVENOUS ONCE AS NEEDED
Status: CANCELLED | OUTPATIENT
Start: 2018-10-05

## 2018-10-05 RX ORDER — SODIUM CHLORIDE 0.9 % (FLUSH) 0.9 %
1-10 SYRINGE (ML) INJECTION AS NEEDED
Status: DISCONTINUED | OUTPATIENT
Start: 2018-10-05 | End: 2018-10-07 | Stop reason: HOSPADM

## 2018-10-05 RX ORDER — TRISODIUM CITRATE DIHYDRATE AND CITRIC ACID MONOHYDRATE 500; 334 MG/5ML; MG/5ML
30 SOLUTION ORAL ONCE
Status: DISCONTINUED | OUTPATIENT
Start: 2018-10-05 | End: 2018-10-05 | Stop reason: HOSPADM

## 2018-10-05 RX ORDER — ACETAMINOPHEN 325 MG/1
650 TABLET ORAL EVERY 4 HOURS PRN
Status: CANCELLED | OUTPATIENT
Start: 2018-10-05

## 2018-10-05 RX ORDER — CALCIUM CARBONATE 200(500)MG
2 TABLET,CHEWABLE ORAL 3 TIMES DAILY PRN
Status: DISCONTINUED | OUTPATIENT
Start: 2018-10-05 | End: 2018-10-05 | Stop reason: HOSPADM

## 2018-10-05 RX ORDER — PROMETHAZINE HYDROCHLORIDE 12.5 MG/1
12.5 TABLET ORAL ONCE AS NEEDED
Status: CANCELLED | OUTPATIENT
Start: 2018-10-05

## 2018-10-05 RX ADMIN — NICOTINE 1 PATCH: 21 PATCH TRANSDERMAL at 23:20

## 2018-10-05 RX ADMIN — SODIUM CHLORIDE, POTASSIUM CHLORIDE, SODIUM LACTATE AND CALCIUM CHLORIDE 125 ML/HR: 600; 310; 30; 20 INJECTION, SOLUTION INTRAVENOUS at 17:28

## 2018-10-05 RX ADMIN — BENZOCAINE AND LEVOMENTHOL: 200; 5 SPRAY TOPICAL at 23:20

## 2018-10-05 RX ADMIN — ROPIVACAINE HYDROCHLORIDE 12 ML/HR: 2 INJECTION, SOLUTION EPIDURAL; INFILTRATION at 18:09

## 2018-10-05 RX ADMIN — PENICILLIN G 3 MILLION UNITS: 3000000 INJECTION, SOLUTION INTRAVENOUS at 17:28

## 2018-10-05 RX ADMIN — FENTANYL CITRATE 75 MCG: 50 INJECTION, SOLUTION INTRAMUSCULAR; INTRAVENOUS at 18:01

## 2018-10-05 RX ADMIN — FENTANYL CITRATE 25 MCG: 50 INJECTION, SOLUTION INTRAMUSCULAR; INTRAVENOUS at 17:59

## 2018-10-05 RX ADMIN — SODIUM CHLORIDE, POTASSIUM CHLORIDE, SODIUM LACTATE AND CALCIUM CHLORIDE 125 ML/HR: 600; 310; 30; 20 INJECTION, SOLUTION INTRAVENOUS at 18:29

## 2018-10-05 RX ADMIN — PENICILLIN G 3 MILLION UNITS: 3000000 INJECTION, SOLUTION INTRAVENOUS at 16:33

## 2018-10-05 RX ADMIN — ONDANSETRON 4 MG: 2 INJECTION INTRAMUSCULAR; INTRAVENOUS at 21:15

## 2018-10-05 RX ADMIN — CALCIUM CARBONATE (ANTACID) CHEW TAB 500 MG 2 TABLET: 500 CHEW TAB at 22:09

## 2018-10-05 RX ADMIN — SODIUM CHLORIDE, POTASSIUM CHLORIDE, SODIUM LACTATE AND CALCIUM CHLORIDE 1000 ML: 600; 310; 30; 20 INJECTION, SOLUTION INTRAVENOUS at 16:33

## 2018-10-05 NOTE — ANESTHESIA PROCEDURE NOTES
CSE Block      Patient reassessed immediately prior to procedure    Patient location during procedure: OB  Staffing  Resident/CRNA: FRAN MARISCAL  Preanesthetic Checklist  Completed: patient identified, site marked, surgical consent, pre-op evaluation, timeout performed, IV checked, risks and benefits discussed and monitors and equipment checked  CSE  Patient position: sitting  Patient monitoring: blood pressure monitoring and continuous pulse oximetry  Procedures: landmark technique and palpation technique  Spinal Needle  Needle type: Pencan   Needle gauge: 25 G  Approach: midline  Location: L3-4  Fluid Appearance: clear  Epidural Needle  Injection technique: BRITTNI saline  Needle type: Tuohy   Needle gauge: 17 G  Location: L3-L4  Level: 10-11  Loss of Resistance: 6cm  Cath Depth at Skin (cm): 10  Aspiration: negative  Test dose: negative  Catheter  Catheter type: end hole  Catheter size: 20 G  Assessment  Dressing:biopatch applied, occlusive dressing applied and secured with tape  Pt Tolerance:patient tolerated the procedure well with no apparent complications  Complications:no  Additional Notes  Risks discussed , including but not limited to:  Headache, itching, n&v, infection, failure, decreased blood pressure, permanent chronic/back pain, nerve damage, paralysis, etc. All questions answered and informed consent obtained. Skin localized with lidocaine skin wheel. Test dose _ ml of 1.5% lidocaine with 1:200,000 epi.

## 2018-10-05 NOTE — PROGRESS NOTES
Chief Complaint   Patient presents with   • Routine Prenatal Visit     GBS repeated today, Error per Lab with last specimen, no complaints       HPI:   Suzette is a  currently at 37w6d who today reports the following:  Contractions - No; Leaking - No; Vaginal bleeding -  No; Swelling of extremities - YES. Good fetal movement - YES.    ROS:  GI: Nausea - No; Constipation - No; Diarrhea - No. RUQ pain - No    Neuro: Headache - No; Visual disturbances - No.    Pertinent items are noted in HPI, all other systems reviewed and negative    Review of History:  The following portions of the patient's history were reviewed and updated as appropriate:problem list, current medications, allergies, past family history, past medical history, past social history and past surgical history.    No current facility-administered medications on file prior to visit.      Current Outpatient Prescriptions on File Prior to Visit   Medication Sig Dispense Refill   • ondansetron ODT (ZOFRAN-ODT) 4 MG disintegrating tablet Dissolve 1 tablet by mouth Every 6 (Six) Hours As Needed for Nausea or Vomiting. 30 tablet 3   • pantoprazole (PROTONIX) 40 MG EC tablet Take 1 tablet by mouth Daily. 30 tablet 0   • Prenatal Vit-Fe Fumarate-FA (PRENATAL VITAMIN 27-0.8) 27-0.8 MG tablet tablet Take 1 tablet by mouth Daily.     • raNITIdine (ZANTAC) 150 MG tablet Take 1 tablet by mouth 2 (Two) Times a Day. 60 tablet 6       EXAM:  /68   Wt 86.6 kg (191 lb)   LMP 2018 (Exact Date)   BMI 31.78 kg/m²     Gen: NAD, conversant  Pulm: No use of accessory muscles, normal respirations  Abdomen: Gravid, nontender, size = dates, + fetal cardiac activity  Ext: no edema, no rashes, WWP  Gait: normal for pregnancy  Psych: Mood, insight, judgement intact  SVE: 2/50/-3    Lab Results   Component Value Date    ABO O 2018    RH Positive 2018    ABSCRN Negative 2018       Smoking status: Current Every Day Smoker                                                    Packs/day: 0.50      Years: 0.00      Smokeless tobacco: Never Used                          I have reviewed the prenatal labs and previous ultrasounds today.    MDM:  Diagnosis: Supervision of low risk pregnancy  Tobacco use in pregnancy   Tests/Orders/Rx today: Orders Placed This Encounter   Procedures   • Group B Streptococcus Culture - Swab, Vaginal/Rectum     Meds Ordered: None   Topics discussed: kick counts and fetal movement  labor signs and symptoms  PIH precautions  tobacco use  BTL   Tests next visit: none   Next visit: 1 week(s)     Steffen Cooley MD  Obstetrics and Gynecology  [facility]

## 2018-10-05 NOTE — ANESTHESIA PREPROCEDURE EVALUATION
Anesthesia Evaluation     Patient summary reviewed and Nursing notes reviewed   no history of anesthetic complications:  NPO Solid Status: > 8 hours  NPO Liquid Status: > 8 hours           Airway   Mallampati: I  TM distance: >3 FB  Neck ROM: full  no difficulty expected  Dental - normal exam     Pulmonary - normal exam   (+) a smoker Current Smoked day of surgery,   Cardiovascular - negative cardio ROS and normal exam    Rhythm: regular  Rate: normal        Neuro/Psych- negative ROS  GI/Hepatic/Renal/Endo    (+)  GERD,      Musculoskeletal (-) negative ROS    Abdominal  - normal exam    Abdomen: soft.  Bowel sounds: normal.   Substance History - negative use     OB/GYN    (+) Pregnant,         Other - negative ROS       ROS/Med Hx Other: Plt 251                  Anesthesia Plan    ASA 2     epidural   (Risks discussed , including but not limited to:  Headache, itching, n&v, infection, failure, decreased blood pressure, permanent chronic/back pain, nerve damage, paralysis, etc. All questions answered and informed consent obtained. Skin localized with lidocaine skin wheel. )  intravenous induction   Anesthetic plan, all risks, benefits, and alternatives have been provided, discussed and informed consent has been obtained with: patient.

## 2018-10-05 NOTE — H&P
MARLENA [facility]  Suzette Levy  : 1990  MRN: [id number]  CSN: [id number]    History and Physical    Subjective   Suzette Levy is a 28 y.o. year old  with an Estimated Date of Delivery: 10/19/18 currently at 38w0d presenting with regular contractions.  Pt reports losing mucus plug last night.  Pt thinks she started leaking fluid at 12:30 pm today.  Pt initially 6 cm upon arrival per nursing.  Pt admitted with RSO; GBS prophylaxis given per patient request as results are still pending secondary to lab error.    Prenatal care has been with [facility].  It has been benign.    History  Obstetric History       T1      L1     SAB0   TAB0   Ectopic0   Molar0   Multiple0   Live Births1       # Outcome Date GA Lbr Francis/2nd Weight Sex Delivery Anes PTL Lv   2 Current            1 Term 11 39w0d  3090 g (6 lb 13 oz) M Vag-Spont EPI  AMIRAH      Complications: Hyperemesis        Past Medical History:   Diagnosis Date   • Acid reflux    • Anemia    • Rubella non-immune status, antepartum 2018     No current facility-administered medications on file prior to encounter.      Current Outpatient Prescriptions on File Prior to Encounter   Medication Sig Dispense Refill   • ondansetron ODT (ZOFRAN-ODT) 4 MG disintegrating tablet Dissolve 1 tablet by mouth Every 6 (Six) Hours As Needed for Nausea or Vomiting. 30 tablet 3   • Prenatal Vit-Fe Fumarate-FA (PRENATAL VITAMIN 27-0.8) 27-0.8 MG tablet tablet Take 1 tablet by mouth Daily.     • raNITIdine (ZANTAC) 150 MG tablet Take 1 tablet by mouth 2 (Two) Times a Day. 60 tablet 6   • pantoprazole (PROTONIX) 40 MG EC tablet Take 1 tablet by mouth Daily. 30 tablet 0     No Known Allergies  Past Surgical History:   Procedure Laterality Date   • MANDIBLE SURGERY       Family History   Problem Relation Age of Onset   • Hypertension Father    • Coronary artery disease Father    • Hypertension Mother    • Diabetes Mother    • Diabetes Paternal Grandfather       Social History     Social History   • Marital status:      Social History Main Topics   • Smoking status: Current Every Day Smoker     Packs/day: 0.50   • Smokeless tobacco: Never Used   • Alcohol use No   • Drug use: No   • Sexual activity: Yes     Partners: Male     Birth control/ protection: None     Other Topics Concern   • Not on file     Review of Systems  All systems were reviewed and negative except for:  Genitourinary: postivie for  pain and leaking fluid     Objective  Vitals:    10/05/18 1830 10/05/18 1831 10/05/18 1845 10/05/18 1900   BP:  106/65 106/68 105/61   BP Location:       Patient Position:       Pulse: 82 82 86 78   Resp:       Temp: 97.9 °F (36.6 °C)      TempSrc: Oral      SpO2: 97%      Weight:       Height:         Physical Exam:  General Appearance: alert, appears stated age and cooperative  Head: normocephalic, without obvious abnormality and atraumatic  Eyes: lids and lashes normal, conjunctivae and sclerae normal, no icterus, no pallor and corneas clear  Lungs: clear to auscultation, respirations regular, respirations even and respirations unlabored  Heart: regular rhythm and normal rate and normal S1, S2  Abdomen: no hepatomegaly, no splenomegaly, soft non-tender, no guarding, no rebound tenderness and uterus gravid and nontender  Extremities: moves extremities well, no edema, no cyanosis and no redness  Skin: no bleeding, bruising or rash and no lesions noted  Psych: normal mood and affect, oriented to person, time and place, thought content organized and appropriate judgment    FHT's:  reassuring and category 1  Cervix:  was checked (by me): 8 cm / 100 % / -3  Presentation:  cephalic  Contractions:  regular    Prenatal Labs  Lab Results   Component Value Date    HGB 11.3 (L) 10/05/2018    HEPBSAG Negative 03/28/2018    ABSCRN Negative 10/05/2018    VYT0NOA0 Non Reactive 03/28/2018    STREPGPB CANCELED 09/27/2018    URINECX Mixed Mamta Isolated 06/27/2018       Current Labs  Reviewed               I reviewed the patient's new clinical results.                  Lab Results (last 24 hours)     Procedure Component Value Units Date/Time    PAMG-1, Rapid Assay For ROM - Amniotic Fluid, Amniotic Sac [[id number]]  (Abnormal) Collected:  10/05/18 1641    Specimen:  Amniotic Fluid from Amniotic Sac Updated:  10/05/18 1722     Rupture of Membranes Positive (C)    Narrative:       Performed by Lakshmi Condon RN    CBC & Differential [[id number]] Collected:  10/05/18 1649    Specimen:  Blood Updated:  10/05/18 1718    Narrative:       The following orders were created for panel order CBC & Differential.  Procedure                               Abnormality         Status                     ---------                               -----------         ------                     CBC Auto Differential[[id number]]        Abnormal            Final result                 Please view results for these tests on the individual orders.    CBC Auto Differential [[id number]]  (Abnormal) Collected:  10/05/18 1649    Specimen:  Blood Updated:  10/05/18 1718     WBC 11.00 (H) 10*3/mm3      RBC 3.45 (L) 10*6/mm3      Hemoglobin 11.3 (L) g/dL      Hematocrit 32.9 (L) %      MCV 95.4 fL      MCH 32.8 (H) pg      MCHC 34.3 g/dL      RDW 13.0 %      RDW-SD 45.3 fl      MPV 10.5 fL      Platelets 251 10*3/mm3      Neutrophil % 72.4 %      Lymphocyte % 16.6 %      Monocyte % 6.9 %      Eosinophil % 2.8 %      Basophil % 0.5 %      Immature Grans % 0.8 (H) %      Neutrophils, Absolute 7.95 (H) 10*3/mm3      Lymphocytes, Absolute 1.83 10*3/mm3      Monocytes, Absolute 0.76 10*3/mm3      Eosinophils, Absolute 0.31 10*3/mm3      Basophils, Absolute 0.06 10*3/mm3      Immature Grans, Absolute 0.09 (H) 10*3/mm3      nRBC 0.0 /100 WBC     POC Urinalysis Dipstick [[id number]]  (Abnormal) Collected:  10/05/18 1651    Specimen:  Urine Updated:  10/05/18 1652     Color Yellow     Clarity, UA Clear     Glucose, UA Negative mg/dL       Bilirubin Negative     Ketones, UA Negative     Specific Gravity  1.020     Blood, UA Large (A)     pH, Urine 6.5     Protein, POC Negative mg/dL      Urobilinogen, UA Normal     Leukocytes Negative     Nitrite, UA Negative             Assessment   1. IUP at 38w0d  2. Group B strep status: pending  3. Active labor  4. Unknown duration of leaking fluid - last night vs 12:30 pm today  5. Desires permanent surgical sterilization     Plan   1. AROM - clear fluid seen   2. Epidural in place  3. Received GBS prophylaxis per patient request and unknown duration of ruptured membranes  4. Anticipate  soon  5. PP tubal    Tiffany Corona M.D.  10/5/2018  7:04 PM

## 2018-10-06 ENCOUNTER — ANESTHESIA (OUTPATIENT)
Dept: PERIOP | Facility: HOSPITAL | Age: 28
End: 2018-10-06

## 2018-10-06 ENCOUNTER — ANESTHESIA EVENT (OUTPATIENT)
Dept: PERIOP | Facility: HOSPITAL | Age: 28
End: 2018-10-06

## 2018-10-06 LAB
BACTERIA SPEC AEROBE CULT: NORMAL
BASOPHILS # BLD AUTO: 0.06 10*3/MM3 (ref 0–0.2)
BASOPHILS NFR BLD AUTO: 0.5 % (ref 0–2.5)
DEPRECATED RDW RBC AUTO: 45.5 FL (ref 37–54)
EOSINOPHIL # BLD AUTO: 0.33 10*3/MM3 (ref 0–0.7)
EOSINOPHIL NFR BLD AUTO: 2.5 % (ref 0–7)
ERYTHROCYTE [DISTWIDTH] IN BLOOD BY AUTOMATED COUNT: 13.1 % (ref 11.5–14.5)
HCT VFR BLD AUTO: 32.1 % (ref 37–47)
HGB BLD-MCNC: 10.7 G/DL (ref 12–16)
IMM GRANULOCYTES # BLD: 0.09 10*3/MM3 (ref 0–0.06)
IMM GRANULOCYTES NFR BLD: 0.7 % (ref 0–0.6)
LYMPHOCYTES # BLD AUTO: 2.45 10*3/MM3 (ref 0.6–3.4)
LYMPHOCYTES NFR BLD AUTO: 18.5 % (ref 10–50)
MCH RBC QN AUTO: 31.8 PG (ref 27–31)
MCHC RBC AUTO-ENTMCNC: 33.3 G/DL (ref 30–37)
MCV RBC AUTO: 95.5 FL (ref 81–99)
MONOCYTES # BLD AUTO: 1.04 10*3/MM3 (ref 0–0.9)
MONOCYTES NFR BLD AUTO: 7.9 % (ref 0–12)
NEUTROPHILS # BLD AUTO: 9.25 10*3/MM3 (ref 2–6.9)
NEUTROPHILS NFR BLD AUTO: 69.9 % (ref 37–80)
NRBC BLD MANUAL-RTO: 0 /100 WBC (ref 0–0)
PLATELET # BLD AUTO: 241 10*3/MM3 (ref 130–400)
PMV BLD AUTO: 10.6 FL (ref 6–12)
RBC # BLD AUTO: 3.36 10*6/MM3 (ref 4.2–5.4)
WBC NRBC COR # BLD: 13.22 10*3/MM3 (ref 4.8–10.8)

## 2018-10-06 PROCEDURE — 25010000002 TDAP 5-2.5-18.5 LF-MCG/0.5 SUSPENSION: Performed by: OBSTETRICS & GYNECOLOGY

## 2018-10-06 PROCEDURE — 90661 CCIIV3 VAC ABX FR 0.5 ML IM: CPT | Performed by: OBSTETRICS & GYNECOLOGY

## 2018-10-06 PROCEDURE — 90715 TDAP VACCINE 7 YRS/> IM: CPT | Performed by: OBSTETRICS & GYNECOLOGY

## 2018-10-06 PROCEDURE — 25010000002 KETOROLAC TROMETHAMINE PER 15 MG: Performed by: NURSE ANESTHETIST, CERTIFIED REGISTERED

## 2018-10-06 PROCEDURE — 25010000002 MIDAZOLAM PER 1 MG: Performed by: NURSE ANESTHETIST, CERTIFIED REGISTERED

## 2018-10-06 PROCEDURE — 25010000002 DEXAMETHASONE PER 1 MG: Performed by: NURSE ANESTHETIST, CERTIFIED REGISTERED

## 2018-10-06 PROCEDURE — 25010000002 INFLUENZA VAC SUBUNIT QUAD 0.5 ML SUSPENSION PREFILLED SYRINGE: Performed by: OBSTETRICS & GYNECOLOGY

## 2018-10-06 PROCEDURE — 25010000002 HYDROMORPHONE 1 MG/ML SOLUTION

## 2018-10-06 PROCEDURE — 25010000002 PROPOFOL 200 MG/20ML EMULSION: Performed by: NURSE ANESTHETIST, CERTIFIED REGISTERED

## 2018-10-06 PROCEDURE — 90471 IMMUNIZATION ADMIN: CPT | Performed by: OBSTETRICS & GYNECOLOGY

## 2018-10-06 PROCEDURE — 25010000002 FENTANYL CITRATE (PF) 100 MCG/2ML SOLUTION: Performed by: NURSE ANESTHETIST, CERTIFIED REGISTERED

## 2018-10-06 PROCEDURE — 25010000002 ONDANSETRON PER 1 MG: Performed by: NURSE ANESTHETIST, CERTIFIED REGISTERED

## 2018-10-06 PROCEDURE — 58605 DIVISION OF FALLOPIAN TUBE: CPT | Performed by: OBSTETRICS & GYNECOLOGY

## 2018-10-06 PROCEDURE — 90732 PPSV23 VACC 2 YRS+ SUBQ/IM: CPT | Performed by: OBSTETRICS & GYNECOLOGY

## 2018-10-06 PROCEDURE — G0008 ADMIN INFLUENZA VIRUS VAC: HCPCS | Performed by: OBSTETRICS & GYNECOLOGY

## 2018-10-06 PROCEDURE — 85025 COMPLETE CBC W/AUTO DIFF WBC: CPT | Performed by: OBSTETRICS & GYNECOLOGY

## 2018-10-06 PROCEDURE — 94799 UNLISTED PULMONARY SVC/PX: CPT

## 2018-10-06 PROCEDURE — 25010000002 METOCLOPRAMIDE PER 10 MG: Performed by: NURSE ANESTHETIST, CERTIFIED REGISTERED

## 2018-10-06 PROCEDURE — 25010000002 ONDANSETRON PER 1 MG: Performed by: OBSTETRICS & GYNECOLOGY

## 2018-10-06 PROCEDURE — 25010000002 PNEUMOCOCCAL VAC POLYVALENT PER 0.5 ML: Performed by: OBSTETRICS & GYNECOLOGY

## 2018-10-06 PROCEDURE — 99024 POSTOP FOLLOW-UP VISIT: CPT | Performed by: OBSTETRICS & GYNECOLOGY

## 2018-10-06 PROCEDURE — G0009 ADMIN PNEUMOCOCCAL VACCINE: HCPCS | Performed by: OBSTETRICS & GYNECOLOGY

## 2018-10-06 DEVICE — CLIP FALLOP FILSHIE TI PR STRL BX/20: Type: IMPLANTABLE DEVICE | Site: FALLOPIAN TUBE | Status: FUNCTIONAL

## 2018-10-06 RX ORDER — IPRATROPIUM BROMIDE AND ALBUTEROL SULFATE 2.5; .5 MG/3ML; MG/3ML
SOLUTION RESPIRATORY (INHALATION)
Status: DISPENSED
Start: 2018-10-06 | End: 2018-10-06

## 2018-10-06 RX ORDER — DEXAMETHASONE SODIUM PHOSPHATE 4 MG/ML
INJECTION, SOLUTION INTRA-ARTICULAR; INTRALESIONAL; INTRAMUSCULAR; INTRAVENOUS; SOFT TISSUE AS NEEDED
Status: DISCONTINUED | OUTPATIENT
Start: 2018-10-06 | End: 2018-10-06 | Stop reason: SURG

## 2018-10-06 RX ORDER — PROMETHAZINE HYDROCHLORIDE 25 MG/ML
6.25 INJECTION, SOLUTION INTRAMUSCULAR; INTRAVENOUS ONCE AS NEEDED
Status: DISCONTINUED | OUTPATIENT
Start: 2018-10-06 | End: 2018-10-06 | Stop reason: HOSPADM

## 2018-10-06 RX ORDER — NEOSTIGMINE METHYLSULFATE 5 MG/5 ML
SYRINGE (ML) INTRAVENOUS AS NEEDED
Status: DISCONTINUED | OUTPATIENT
Start: 2018-10-06 | End: 2018-10-06 | Stop reason: SURG

## 2018-10-06 RX ORDER — PROPOFOL 10 MG/ML
INJECTION, EMULSION INTRAVENOUS AS NEEDED
Status: DISCONTINUED | OUTPATIENT
Start: 2018-10-06 | End: 2018-10-06 | Stop reason: SURG

## 2018-10-06 RX ORDER — LIDOCAINE HYDROCHLORIDE 15 MG/ML
INJECTION, SOLUTION EPIDURAL; INFILTRATION; INTRACAUDAL; PERINEURAL AS NEEDED
Status: DISCONTINUED | OUTPATIENT
Start: 2018-10-06 | End: 2018-10-06 | Stop reason: SURG

## 2018-10-06 RX ORDER — KETAMINE HYDROCHLORIDE 50 MG/ML
INJECTION, SOLUTION, CONCENTRATE INTRAMUSCULAR; INTRAVENOUS AS NEEDED
Status: DISCONTINUED | OUTPATIENT
Start: 2018-10-06 | End: 2018-10-06 | Stop reason: SURG

## 2018-10-06 RX ORDER — KETOROLAC TROMETHAMINE 30 MG/ML
INJECTION, SOLUTION INTRAMUSCULAR; INTRAVENOUS AS NEEDED
Status: DISCONTINUED | OUTPATIENT
Start: 2018-10-06 | End: 2018-10-06 | Stop reason: SURG

## 2018-10-06 RX ORDER — METOCLOPRAMIDE HYDROCHLORIDE 5 MG/ML
INJECTION INTRAMUSCULAR; INTRAVENOUS AS NEEDED
Status: DISCONTINUED | OUTPATIENT
Start: 2018-10-06 | End: 2018-10-06 | Stop reason: SURG

## 2018-10-06 RX ORDER — MIDAZOLAM HYDROCHLORIDE 1 MG/ML
INJECTION INTRAMUSCULAR; INTRAVENOUS AS NEEDED
Status: DISCONTINUED | OUTPATIENT
Start: 2018-10-06 | End: 2018-10-06 | Stop reason: SURG

## 2018-10-06 RX ORDER — IPRATROPIUM BROMIDE AND ALBUTEROL SULFATE 2.5; .5 MG/3ML; MG/3ML
3 SOLUTION RESPIRATORY (INHALATION) ONCE
Status: DISCONTINUED | OUTPATIENT
Start: 2018-10-06 | End: 2018-10-06 | Stop reason: HOSPADM

## 2018-10-06 RX ORDER — MEPERIDINE HYDROCHLORIDE 50 MG/ML
50 INJECTION INTRAMUSCULAR; INTRAVENOUS; SUBCUTANEOUS ONCE AS NEEDED
Status: DISCONTINUED | OUTPATIENT
Start: 2018-10-06 | End: 2018-10-06 | Stop reason: ALTCHOICE

## 2018-10-06 RX ORDER — ONDANSETRON 2 MG/ML
INJECTION INTRAMUSCULAR; INTRAVENOUS AS NEEDED
Status: DISCONTINUED | OUTPATIENT
Start: 2018-10-06 | End: 2018-10-06 | Stop reason: SURG

## 2018-10-06 RX ORDER — ROCURONIUM BROMIDE 10 MG/ML
INJECTION, SOLUTION INTRAVENOUS AS NEEDED
Status: DISCONTINUED | OUTPATIENT
Start: 2018-10-06 | End: 2018-10-06 | Stop reason: SURG

## 2018-10-06 RX ORDER — GLYCOPYRROLATE 0.2 MG/ML
INJECTION INTRAMUSCULAR; INTRAVENOUS AS NEEDED
Status: DISCONTINUED | OUTPATIENT
Start: 2018-10-06 | End: 2018-10-06 | Stop reason: SURG

## 2018-10-06 RX ORDER — ONDANSETRON 2 MG/ML
4 INJECTION INTRAMUSCULAR; INTRAVENOUS ONCE AS NEEDED
Status: DISCONTINUED | OUTPATIENT
Start: 2018-10-06 | End: 2018-10-06 | Stop reason: HOSPADM

## 2018-10-06 RX ORDER — FENTANYL CITRATE 50 UG/ML
INJECTION, SOLUTION INTRAMUSCULAR; INTRAVENOUS AS NEEDED
Status: DISCONTINUED | OUTPATIENT
Start: 2018-10-06 | End: 2018-10-06 | Stop reason: SURG

## 2018-10-06 RX ORDER — FAMOTIDINE 10 MG/ML
INJECTION, SOLUTION INTRAVENOUS
Status: COMPLETED
Start: 2018-10-06 | End: 2018-10-06

## 2018-10-06 RX ORDER — SODIUM CHLORIDE, SODIUM LACTATE, POTASSIUM CHLORIDE, CALCIUM CHLORIDE 600; 310; 30; 20 MG/100ML; MG/100ML; MG/100ML; MG/100ML
INJECTION, SOLUTION INTRAVENOUS CONTINUOUS PRN
Status: DISCONTINUED | OUTPATIENT
Start: 2018-10-06 | End: 2018-10-06 | Stop reason: SURG

## 2018-10-06 RX ADMIN — SODIUM CHLORIDE, POTASSIUM CHLORIDE, SODIUM LACTATE AND CALCIUM CHLORIDE: 600; 310; 30; 20 INJECTION, SOLUTION INTRAVENOUS at 07:17

## 2018-10-06 RX ADMIN — Medication 4 MG: at 08:38

## 2018-10-06 RX ADMIN — LIDOCAINE HYDROCHLORIDE 30 MG: 15 INJECTION, SOLUTION EPIDURAL; INFILTRATION; INTRACAUDAL; PERINEURAL at 08:11

## 2018-10-06 RX ADMIN — PROPOFOL 50 MG: 10 INJECTION, EMULSION INTRAVENOUS at 08:28

## 2018-10-06 RX ADMIN — MIDAZOLAM HYDROCHLORIDE 2 MG: 1 INJECTION, SOLUTION INTRAMUSCULAR; INTRAVENOUS at 07:59

## 2018-10-06 RX ADMIN — HYDROCODONE BITARTRATE AND ACETAMINOPHEN 1 TABLET: 5; 325 TABLET ORAL at 10:14

## 2018-10-06 RX ADMIN — TETANUS TOXOID, REDUCED DIPHTHERIA TOXOID AND ACELLULAR PERTUSSIS VACCINE, ADSORBED 0.5 ML: 5; 2.5; 8; 8; 2.5 SUSPENSION INTRAMUSCULAR at 14:10

## 2018-10-06 RX ADMIN — DEXAMETHASONE SODIUM PHOSPHATE 8 MG: 4 INJECTION, SOLUTION INTRAMUSCULAR; INTRAVENOUS at 08:23

## 2018-10-06 RX ADMIN — HYDROCODONE BITARTRATE AND ACETAMINOPHEN 1 TABLET: 5; 325 TABLET ORAL at 01:48

## 2018-10-06 RX ADMIN — FENTANYL CITRATE 50 MCG: 50 INJECTION, SOLUTION INTRAMUSCULAR; INTRAVENOUS at 08:10

## 2018-10-06 RX ADMIN — IBUPROFEN 600 MG: 600 TABLET ORAL at 21:01

## 2018-10-06 RX ADMIN — SODIUM CHLORIDE, POTASSIUM CHLORIDE, SODIUM LACTATE AND CALCIUM CHLORIDE: 600; 310; 30; 20 INJECTION, SOLUTION INTRAVENOUS at 08:45

## 2018-10-06 RX ADMIN — Medication 0.5 MG: at 09:15

## 2018-10-06 RX ADMIN — INFLUENZA A VIRUS A/SINGAPORE/GP1908/2015 IVR-180 (H1N1) ANTIGEN (MDCK CELL DERIVED, PROPIOLACTONE INACTIVATED), INFLUENZA A VIRUS A/NORTH CAROLINA/04/2016 (H3N2) HEMAGGLUTININ ANTIGEN (MDCK CELL DERIVED, PROPIOLACTONE INACTIVATED), INFLUENZA B VIRUS B/IOWA/06/2017 HEMAGGLUTININ ANTIGEN (MDCK CELL DERIVED, PROPIOLACTONE INACTIVATED), INFLUENZA B VIRUS B/SINGAPORE/INFTT-16-0610/2016 HEMAGGLUTININ ANTIGEN (MDCK CELL DERIVED, PROPIOLACTONE INACTIVATED) 0.5 ML: 15; 15; 15; 15 INJECTION, SUSPENSION INTRAMUSCULAR at 14:11

## 2018-10-06 RX ADMIN — Medication: at 16:46

## 2018-10-06 RX ADMIN — HYDROCODONE BITARTRATE AND ACETAMINOPHEN 1 TABLET: 5; 325 TABLET ORAL at 13:49

## 2018-10-06 RX ADMIN — KETAMINE HYDROCHLORIDE 25 MG: 50 INJECTION, SOLUTION INTRAMUSCULAR; INTRAVENOUS at 08:19

## 2018-10-06 RX ADMIN — GLYCOPYRROLATE 0.8 MG: 0.2 INJECTION, SOLUTION INTRAMUSCULAR; INTRAVENOUS at 08:37

## 2018-10-06 RX ADMIN — HYDROMORPHONE HYDROCHLORIDE 0.5 MG: 1 INJECTION, SOLUTION INTRAMUSCULAR; INTRAVENOUS; SUBCUTANEOUS at 09:15

## 2018-10-06 RX ADMIN — FENTANYL CITRATE 50 MCG: 50 INJECTION, SOLUTION INTRAMUSCULAR; INTRAVENOUS at 08:30

## 2018-10-06 RX ADMIN — ROCURONIUM BROMIDE 15 MG: 10 INJECTION INTRAVENOUS at 08:12

## 2018-10-06 RX ADMIN — KETOROLAC TROMETHAMINE 30 MG: 30 INJECTION, SOLUTION INTRAMUSCULAR at 08:31

## 2018-10-06 RX ADMIN — FAMOTIDINE 20 MG: 10 INJECTION, SOLUTION INTRAVENOUS at 07:46

## 2018-10-06 RX ADMIN — IBUPROFEN 600 MG: 600 TABLET ORAL at 13:49

## 2018-10-06 RX ADMIN — NICOTINE 1 PATCH: 21 PATCH TRANSDERMAL at 14:09

## 2018-10-06 RX ADMIN — ONDANSETRON 4 MG: 2 INJECTION INTRAMUSCULAR; INTRAVENOUS at 10:14

## 2018-10-06 RX ADMIN — PROPOFOL 150 MG: 10 INJECTION, EMULSION INTRAVENOUS at 08:12

## 2018-10-06 RX ADMIN — ROCURONIUM BROMIDE 10 MG: 10 INJECTION INTRAVENOUS at 08:11

## 2018-10-06 RX ADMIN — ONDANSETRON 4 MG: 2 INJECTION INTRAMUSCULAR; INTRAVENOUS at 08:23

## 2018-10-06 RX ADMIN — PRENATAL VIT W/ FE FUMARATE-FA TAB 27-0.8 MG 1 TABLET: 27-0.8 TAB at 11:58

## 2018-10-06 RX ADMIN — ROCURONIUM BROMIDE 5 MG: 10 INJECTION INTRAVENOUS at 08:28

## 2018-10-06 RX ADMIN — MEASLES, MUMPS, AND RUBELLA VIRUS VACCINE LIVE 0.5 ML: 1000; 12500; 1000 INJECTION, POWDER, LYOPHILIZED, FOR SUSPENSION SUBCUTANEOUS at 14:09

## 2018-10-06 RX ADMIN — METOCLOPRAMIDE 5 MG: 5 INJECTION, SOLUTION INTRAMUSCULAR; INTRAVENOUS at 07:46

## 2018-10-06 RX ADMIN — HYDROMORPHONE HYDROCHLORIDE 0.5 MG: 1 INJECTION, SOLUTION INTRAMUSCULAR; INTRAVENOUS; SUBCUTANEOUS at 09:02

## 2018-10-06 RX ADMIN — PNEUMOCOCCAL VACCINE POLYVALENT 0.5 ML
25; 25; 25; 25; 25; 25; 25; 25; 25; 25; 25; 25; 25; 25; 25; 25; 25; 25; 25; 25; 25; 25; 25 INJECTION, SOLUTION INTRAMUSCULAR; SUBCUTANEOUS at 14:12

## 2018-10-06 RX ADMIN — Medication 0.5 MG: at 09:02

## 2018-10-06 NOTE — ANESTHESIA PREPROCEDURE EVALUATION
Anesthesia Evaluation     Patient summary reviewed and Nursing notes reviewed   no history of anesthetic complications:  NPO Solid Status: > 8 hours  NPO Liquid Status: > 8 hours           Airway   Mallampati: I  TM distance: >3 FB  Neck ROM: full  no difficulty expected  Dental - normal exam     Pulmonary - normal exam   (+) a smoker Current Smoked day of surgery, shortness of breath,   Cardiovascular - normal exam    Rhythm: regular  Rate: normal    (+) GUERRERO,       Neuro/Psych- negative ROS  GI/Hepatic/Renal/Endo    (+) obesity,  GERD,      Musculoskeletal (-) negative ROS    Abdominal  - normal exam    Abdomen: soft.  Bowel sounds: normal.   Substance History   (+) drug use     OB/GYN    (+) Pregnant,         Other - negative ROS       ROS/Med Hx Other: Plt 251  Labs reviewed                    Anesthesia Plan    ASA 2 - emergent     epidural and general   (Risks and benefits discussed including risk of aspiration, recall and dental damage. All patient questions answered. Will continue with POC.)  intravenous induction   Anesthetic plan, all risks, benefits, and alternatives have been provided, discussed and informed consent has been obtained with: patient.

## 2018-10-06 NOTE — NURSING NOTE
Pt's lungs are wheezey inspiratory and expiratory. Pt smokes and has smoked since 15years of age. Anaesthesia notified.

## 2018-10-06 NOTE — ANESTHESIA POSTPROCEDURE EVALUATION
Patient: uSzette Levy    Procedure Summary     Date:  10/06/18 Room / Location:  [facility] OR [address] [place] [address] OR    Anesthesia Start:  807 Anesthesia Stop:      Procedure:  POSTPARTUM TUBAL LIGATION (Bilateral Abdomen) Diagnosis:        (spontaneous vaginal delivery)      ( (spontaneous vaginal delivery) [O80])    Surgeon:  Tiffany Corona MD Provider:  Michael Taylor CRNA    Anesthesia Type:  general ASA Status:  2 - Emergent          Anesthesia Type: general  Last vitals  BP   118/79 (10/06/18 0732)   Temp   98.6 °F (37 °C) (10/06/18 0732)   Pulse   98 (10/06/18 0800)   Resp   18 (10/06/18 0800)     SpO2   100 % (10/06/18 0800)     Post Anesthesia Care and Evaluation    Patient location during evaluation: PACU  Patient participation: complete - patient participated  Level of consciousness: awake  Pain score: 3  Pain management: adequate  Airway patency: patent  Anesthetic complications: No anesthetic complications  PONV Status: none  Cardiovascular status: acceptable  Respiratory status: acceptable and face mask  Hydration status: acceptable    Comments: vsss resp spont, reflexes intact, responsive, report given to pacu nurse

## 2018-10-06 NOTE — ANESTHESIA PROCEDURE NOTES
Airway  Urgency: elective    Airway not difficult    General Information and Staff    Patient location during procedure: OR  CRNA: XIOMARA NOGUEIRA    Indications and Patient Condition  Indications for airway management: airway protection    Preoxygenated: yes (3 min)  Mask difficulty assessment: 0 - not attempted    Final Airway Details  Final airway type: endotracheal airway      Successful airway: ETT  Cuffed: yes   Successful intubation technique: direct laryngoscopy and RSI  Facilitating devices/methods: intubating stylet and cricoid pressure  Endotracheal tube insertion site: oral  Blade: Zeb  Blade size: 3  ETT size: 7.5 mm  Cormack-Lehane Classification: grade I - full view of glottis  Placement verified by: chest auscultation, capnometry and palpation of cuff   Cuff volume (mL): 5  Measured from: lips  ETT to lips (cm): 21  Number of attempts at approach: 1    Additional Comments  Intubated by dvnt via rsi, cuff up with mov, bbs and expansion =, + etco, taped at lips, cricoid pressure released, tolerated induction and intubation without adverse rx.

## 2018-10-06 NOTE — PROGRESS NOTES
[facility]  Vaginal Delivery Note    Delivery details     Delivery: Vaginal, Spontaneous Delivery    YOB: 2019    Time of Birth: 7:55 PM      Anesthesia: Epidural     Delivering clinician: Tiffany Corona     Forceps?   No   Vacuum? No    Shoulder dystocia present: No      Delivery narrative:  Pt progressed quickly to complete.  Pt pushed x 3.  Pt delivered viable male infant from OA presentation.  Nuchal cord x 2 which was reduced.  Infant bulb suctioned.  Delay in cord clamping x 30-60 seconds.  Cord clamped and cut by FOB.  Infant placed on maternal abdomen then taken to warmer for evaluation per pediatric nurse in attendance.  Pt delivered over an intact perineum other than superficial matt-urethral laceration with small amount of active bleeding; laceration sutured with 3-0 chromic on SH x 2 without complications.  Red rubber catheter placed for placement of sutures.  Cord blood had been obtained.  Placenta delivered spontaneously, intact; 3 vessel cord noted.    Infant details    Findings: Male infant     Infant observations: Weight: No birth weight on file.   Length:    in   Apgars: 8   @ 1 minute /    9   @ 5 minutes     Placenta, Cord, and Fluid    Placenta delivered     at   10/5/2018  7:59 PM     Cord:    present.   Nuchal Cord?  yes; Number of nuchal loops present:   2   Cord blood obtained:        Repair    Episiotomy: Not recorded    Lacerations: Yes  Laceration Information  Laceration Repaired?   Perineal:         Periurethral: left  Yes    Labial:         Sulcus:         Vaginal:         Cervical:              Estimated Blood Loss: 500 ml   Suture used for repair: 3-0 chromic gut       Complications  none    Disposition  Mother to Mother Baby/Postpartum  in stable condition currently.  Baby to remains with mom  in stable condition currently.      Tiffany Corona MD  10/05/18  8:39 PM

## 2018-10-06 NOTE — L&D DELIVERY NOTE
[facility]  Vaginal Delivery Note    Delivery     Delivery: Vaginal, Spontaneous Delivery     YOB: 2019    Time of Birth: 7:55 PM      Anesthesia: Epidural     Delivering clinician: Tiffany Corona    Forceps?   No   Vacuum? No    Shoulder dystocia present: No        Delivery narrative:  See other delivery note    Infant    Findings: male  infant     Infant observations: Weight: 2608 g (5 lb 12 oz)   Length: 19.25  in  Observations/Comments:         Apgars: 8   @ 1 minute /    9   @ 5 minutes   Infant Name: Amrik     Placenta, Cord, and Fluid    Placenta delivered     at   10/5/2018  7:59 PM     Cord:    present.   Nuchal Cord?  yes; Number of nuchal loops present:  2    Cord blood obtained: Yes    Cord gases obtained:  No    Cord gas results: Venous:  No results found for: PHCVEN    Arterial:  No results found for: PHCART     Repair    Episiotomy: Not recorded    Lacerations: Yes  Laceration Information  Laceration Repaired?   Perineal:         Periurethral: left  Yes    Labial:         Sulcus:         Vaginal:         Cervical:           Suture used for repair: 3-0 chromic gut    ml         Complications  none    Disposition  Mother to Remain in LD  in stable condition currently.  Baby to remains with mom  in stable condition currently.      Tiffany Corona MD  10/06/18  8:13 AM

## 2018-10-06 NOTE — OP NOTE
Virgilio Levy  : [id number]  CSN: [id number]  Date: 10/6/2018    Operative Note    POSTPARTUM TUBAL    Pre-op Diagnosis:  Desires sterilization    Post-op Diagnosis:  Same as above   Procedure: Postpartum tubal with bilateral placement of Filshie Clips   Surgeon: Tiffany Corona M.D.   Anesthesia: General endotracheal anethesia   Complications: None       Indications:  Patient is a 28 y.o. year old  who recently had an uncomplicated vaginal delivery.  She did express the desire for permanent sterilization.  Appropriate surgical consents were signed and on chart.  Desire to proceed with the operation was reconfirmed prior to beginning the procedure.  The patient had been counseled regarding the risks, complications, benefits, and other alternatives for birth control.  The patient had also been informed regarding the failure of the procedure.    Procedure: After the appropriate time out, the patient was prepped and draped in the usual sterile fashion.  The bladder had been drained with a foster catheter.  An infraumbilical incision was made with the knife and carried down to the fascia.  The fascia was nicked with a knife and the incision was extended bilaterally with scissors.  The peritoneum was identified and entered sharply without any complications.  The patient was placed in trendelenburg position.  The left fallopian tube was identified by its fimbriated end and a Filshie clip was applied perpendicular to the tube in the isthmic portion of the tube x 2.  Likewise, the same procedure was performed on the contralateral side without any complications.  Repeat inspection bilaterally revealed clips to be in place.  The subfascial tissue was inspected and noted to be hemostatic.  The fascia and peritoneum was closed with 0 Vicryl in a running, non-locking fashion.  The subcutaneous tissue was inspected and noted to be hemostatic with minimal bovie electrocautery.  The skin was approximated with 4-0  nylon in an interrupted fashion.    There was scant bleeding.  A dressing was applied.  There were no complications.  All instruments and sponge counts were correct at the end of the procedure.  The patient tolerated the procedure well and was taken to postoperative recovery room in stable condition.    Tiffany Corona M.D.  10/6/2018  8:41 AM

## 2018-10-06 NOTE — PLAN OF CARE
Problem: Patient Care Overview  Goal: Plan of Care Review  Outcome: Ongoing (interventions implemented as appropriate)   10/06/18 1119   Coping/Psychosocial   Plan of Care Reviewed With patient;spouse   Plan of Care Review   Progress improving   OTHER   Outcome Summary VSS, stabilization of BP.     Goal: Individualization and Mutuality  Outcome: Ongoing (interventions implemented as appropriate)    Goal: Discharge Needs Assessment  Outcome: Ongoing (interventions implemented as appropriate)    Goal: Interprofessional Rounds/Family Conf  Outcome: Ongoing (interventions implemented as appropriate)      Problem: Postpartum (Vaginal Delivery) (Adult,Obstetrics,Pediatric)  Goal: Signs and Symptoms of Listed Potential Problems Will be Absent, Minimized or Managed (Postpartum)  Outcome: Ongoing (interventions implemented as appropriate)      Problem: Breastfeeding (Adult,Obstetrics,Pediatric)  Goal: Signs and Symptoms of Listed Potential Problems Will be Absent, Minimized or Managed (Breastfeeding)  Outcome: Ongoing (interventions implemented as appropriate)

## 2018-10-06 NOTE — PROGRESS NOTES
Virgilio Levy  : 1990  MRN: [id number]  CSN: [id number]    Postpartum Day #1  Subjective   Her pain is well controlled.  Vaginal bleeding is appropriate amount.  She is not passing gas and has not had a bowel movement. Upon review of her respiratory system, she has no respiratory symptoms and and denies SOB, cough, chest pain.  Pt is a smoker.  Pt denies being dizzy or lightheaded.  Pt has been up to ambulate without difficulty.  Pt is desiring tubal ligation.     Objective     Min/max vitals past 24 hours:   Temp  Min: 97.9 °F (36.6 °C)  Max: 98.6 °F (37 °C)  BP  Min: 92/54  Max: 136/75  Pulse  Min: 68  Max: 124  Resp  Min: 16  Max: 22        General: well developed; well nourished  no acute distress   Resp: breathing is unlabored  wheezes expiratory   CV: regular rate and rhythm, S1, S2 normal, no murmur, click, rub or gallop   Abdomen: soft, non-tender; no masses  no umbilical or inginual hernias are present  no hepato-splenomegaly  fundus firm and non-tender   Pelvic: Not performed   Ext: normal appearance with no cyanosis or edema and no calf tenderness       Results from last 7 days  Lab Units 10/06/18  0626 10/05/18  1649   WBC 10*3/mm3 13.22* 11.00*   HEMOGLOBIN g/dL 10.7* 11.3*   HEMATOCRIT % 32.1* 32.9*   PLATELETS 10*3/mm3 241 251     Lab Results   Component Value Date    RUBELLAABIGG <0.90 (L) 2018    RH Positive 10/05/2018    RH Positive 10/05/2018    HEPBSAG Negative 2018        Assessment   1. Postpartum Day #1 S/P vaginal delivery  2. Desires permanent surgical sterilization     Plan   1. Continue routine postpartum care  2. PP tubal ligation - risks, complications, benefits, and other alternatives discussed  3. Anesthesia to see patient preop  4. Rubella prior to d/c    Tiffany Corona M.D.  10/6/2018  7:52 AM

## 2018-10-06 NOTE — INTERVAL H&P NOTE
H&P reviewed. The patient was examined and there are no changes to the H&P other than patient now s/p .  Pt is certain she desires permanent surgical sterilization.  The risks, complications, benefits, and other alternatives were discussed.  The failure rate was discussed as well from the procedure.

## 2018-10-07 VITALS
HEART RATE: 75 BPM | HEIGHT: 65 IN | BODY MASS INDEX: 31.82 KG/M2 | SYSTOLIC BLOOD PRESSURE: 114 MMHG | DIASTOLIC BLOOD PRESSURE: 79 MMHG | RESPIRATION RATE: 22 BRPM | OXYGEN SATURATION: 97 % | TEMPERATURE: 98.2 F | WEIGHT: 191 LBS

## 2018-10-07 PROBLEM — O21.0 HYPEREMESIS GRAVIDARUM: Status: RESOLVED | Noted: 2018-03-22 | Resolved: 2018-10-07

## 2018-10-07 PROBLEM — O21.0 HYPEREMESIS AFFECTING PREGNANCY, ANTEPARTUM: Status: RESOLVED | Noted: 2018-06-28 | Resolved: 2018-10-07

## 2018-10-07 PROBLEM — Z34.90 PREGNANT AND NOT YET DELIVERED: Status: RESOLVED | Noted: 2018-06-27 | Resolved: 2018-10-07

## 2018-10-07 PROCEDURE — 99024 POSTOP FOLLOW-UP VISIT: CPT | Performed by: OBSTETRICS & GYNECOLOGY

## 2018-10-07 RX ORDER — DOCUSATE SODIUM 100 MG/1
100 CAPSULE, LIQUID FILLED ORAL 2 TIMES DAILY
Qty: 60 CAPSULE | Refills: 0 | Status: SHIPPED | OUTPATIENT
Start: 2018-10-07 | End: 2018-11-06

## 2018-10-07 RX ORDER — FERROUS SULFATE 325(65) MG
325 TABLET ORAL
Qty: 60 TABLET | Refills: 0 | Status: SHIPPED | OUTPATIENT
Start: 2018-10-07 | End: 2018-11-06

## 2018-10-07 RX ORDER — IBUPROFEN 600 MG/1
600 TABLET ORAL EVERY 6 HOURS PRN
Qty: 60 TABLET | Refills: 0 | Status: SHIPPED | OUTPATIENT
Start: 2018-10-07 | End: 2018-11-29

## 2018-10-07 RX ORDER — HYDROCODONE BITARTRATE AND ACETAMINOPHEN 5; 325 MG/1; MG/1
1 TABLET ORAL EVERY 6 HOURS PRN
Qty: 20 TABLET | Refills: 0 | Status: SHIPPED | OUTPATIENT
Start: 2018-10-07 | End: 2018-11-29

## 2018-10-07 RX ADMIN — Medication: at 09:08

## 2018-10-07 RX ADMIN — HYDROCODONE BITARTRATE AND ACETAMINOPHEN 1 TABLET: 5; 325 TABLET ORAL at 09:09

## 2018-10-07 RX ADMIN — PRENATAL VIT W/ FE FUMARATE-FA TAB 27-0.8 MG 1 TABLET: 27-0.8 TAB at 09:09

## 2018-10-07 RX ADMIN — BENZOCAINE AND LEVOMENTHOL: 200; 5 SPRAY TOPICAL at 09:08

## 2018-10-07 RX ADMIN — IBUPROFEN 600 MG: 600 TABLET ORAL at 09:09

## 2018-10-07 NOTE — PROGRESS NOTES
"Patient: Suzette Levy  Procedure(s):  POSTPARTUM TUBAL LIGATION  Anesthesia type: [unfilled]    Patient location: Labor and Delivery  Last vitals: /79 (BP Location: Left arm, Patient Position: Sitting)   Pulse 75   Temp 98.2 °F (36.8 °C) (Oral)   Resp 22   Ht 165.1 cm (65\")   Wt 86.6 kg (191 lb)   LMP 01/13/2018 (Exact Date)   SpO2 97%   Breastfeeding? Yes   BMI 31.78 kg/m²   Level of consciousness: awake, alert and oriented    Post-anesthesia pain: adequate analgesia  Airway patency: patent  Respiratory: unassisted  Cardiovascular: stable and blood pressure at baseline  Hydration: euvolemic    Anesthetic complications: no   Mother and baby doing well  "

## 2018-10-07 NOTE — PLAN OF CARE
Problem: Patient Care Overview  Goal: Plan of Care Review  Outcome: Ongoing (interventions implemented as appropriate)   10/07/18 0433   Coping/Psychosocial   Plan of Care Reviewed With patient   Plan of Care Review   Progress improving   OTHER   Outcome Summary VSS, adequate pain control anticipate D/C     Goal: Discharge Needs Assessment  Outcome: Ongoing (interventions implemented as appropriate)    Goal: Interprofessional Rounds/Family Conf  Outcome: Ongoing (interventions implemented as appropriate)      Problem: Postpartum (Vaginal Delivery) (Adult,Obstetrics,Pediatric)  Goal: Signs and Symptoms of Listed Potential Problems Will be Absent, Minimized or Managed (Postpartum)  Outcome: Ongoing (interventions implemented as appropriate)      Problem: Breastfeeding (Adult,Obstetrics,Pediatric)  Goal: Signs and Symptoms of Listed Potential Problems Will be Absent, Minimized or Managed (Breastfeeding)  Outcome: Ongoing (interventions implemented as appropriate)

## 2018-10-07 NOTE — NURSING NOTE
Pt discharged, denies any complaints. Accompanied by FOB, tra, and Matthew DORANTES FOB fastened baby in rear facing car seat.

## 2018-10-07 NOTE — PLAN OF CARE
Problem: Patient Care Overview  Goal: Plan of Care Review  Outcome: Outcome(s) achieved Date Met: 10/07/18   10/07/18 0433 10/07/18 0746   Coping/Psychosocial   Plan of Care Reviewed With --  patient;spouse   Plan of Care Review   Progress --  improving   OTHER   Outcome Summary VSS, adequate pain control anticipate D/C --      Goal: Individualization and Mutuality  Outcome: Outcome(s) achieved Date Met: 10/07/18    Goal: Discharge Needs Assessment  Outcome: Outcome(s) achieved Date Met: 10/07/18    Goal: Interprofessional Rounds/Family Conf  Outcome: Outcome(s) achieved Date Met: 10/07/18      Problem: Postpartum (Vaginal Delivery) (Adult,Obstetrics,Pediatric)  Goal: Signs and Symptoms of Listed Potential Problems Will be Absent, Minimized or Managed (Postpartum)  Outcome: Outcome(s) achieved Date Met: 10/07/18      Problem: Breastfeeding (Adult,Obstetrics,Pediatric)  Goal: Signs and Symptoms of Listed Potential Problems Will be Absent, Minimized or Managed (Breastfeeding)  Outcome: Outcome(s) achieved Date Met: 10/07/18

## 2018-10-07 NOTE — DISCHARGE SUMMARY
Discharge Summary     Virgilio Levy  : 1990  MRN: [id number]  CSN: [id number]    Date of Admission: 10/5/2018   Date of Discharge:  10/7/2018   Delivering Physician: Tiffany Corona        Admission Diagnosis: 1. Pregnancy [Z34.90]   2. Desires permanent surgical sterilization   Discharge Diagnosis: 1. Same as above plus  2. Pregnancy at 38w0d - delivered       Procedures: 10/5/2018  - Vaginal, Spontaneous Delivery    10/6/2018  - Postpartum tubal     Hospital Course  Patient is a 28 y.o.  who at 38w0d had a vaginal birth.  Her postpartum course was without complications. The patient desired permanent surgical sterilization.  On PPD #1 she underwent a pp tubal without complications.   On PPD #2 she was ready for discharge.  She had normal lochia and pain well controlled with oral medications.  She has had flatus.  Her uterus is firm and non-tender below the umbilicus.  Her incision is CDI.  D/C instructions and precautions are given.  She is to f/u at the end of the week for suture removal.  Pt verbalizes understanding.    Infant  male  fetus weighing 2608 g (5 lb 12 oz)   Apgars -  8  @ 1 minute /  9  @ 5 minutes.    Discharge labs  Lab Results   Component Value Date    WBC 13.22 (H) 10/06/2018    HGB 10.7 (L) 10/06/2018    HCT 32.1 (L) 10/06/2018     10/06/2018       Discharge Medications     Discharge Medications      New Medications      Instructions Start Date   docusate sodium 100 MG capsule  Commonly known as:  COLACE   100 mg, Oral, 2 Times Daily      ferrous sulfate 325 (65 FE) MG tablet   325 mg, Oral, 2 Times Daily Before Meals      HYDROcodone-acetaminophen 5-325 MG per tablet  Commonly known as:  NORCO   1 tablet, Oral, Every 6 Hours PRN      ibuprofen 600 MG tablet  Commonly known as:  ADVIL,MOTRIN   600 mg, Oral, Every 6 Hours PRN         Continue These Medications      Instructions Start Date   pantoprazole 40 MG EC tablet  Commonly known as:  PROTONIX   40 mg, Oral,  Daily         Stop These Medications    raNITIdine 150 MG tablet  Commonly known as:  ZANTAC        ASK your doctor about these medications      Instructions Start Date   ondansetron ODT 4 MG disintegrating tablet  Commonly known as:  ZOFRAN-ODT   4 mg, Oral, Every 6 Hours PRN      prenatal vitamin 27-0.8 27-0.8 MG tablet tablet   1 tablet, Oral, Daily             Discharge Disposition Home   Condition on Discharge: Stable   Follow-up: 1 week with DARIEN Corona M.D.  10/7/2018

## 2018-10-15 ENCOUNTER — OFFICE VISIT (OUTPATIENT)
Dept: OBSTETRICS AND GYNECOLOGY | Facility: CLINIC | Age: 28
End: 2018-10-15

## 2018-10-15 VITALS
BODY MASS INDEX: 30.09 KG/M2 | SYSTOLIC BLOOD PRESSURE: 128 MMHG | HEIGHT: 65 IN | WEIGHT: 180.6 LBS | DIASTOLIC BLOOD PRESSURE: 80 MMHG

## 2018-10-15 DIAGNOSIS — Z09 POSTOP CHECK: Primary | ICD-10-CM

## 2018-10-15 PROCEDURE — 99024 POSTOP FOLLOW-UP VISIT: CPT | Performed by: PHYSICIAN ASSISTANT

## 2018-10-15 RX ORDER — RANITIDINE 150 MG/1
TABLET ORAL
Refills: 6 | COMMUNITY
Start: 2018-09-25 | End: 2018-11-29

## 2018-10-15 NOTE — PROGRESS NOTES
Subjective   Chief Complaint   Patient presents with   • Post-op     10 days Postpartum tubal; sutures removed and steri-strips applied; doing well       Suzette Levy is a 28 y.o. year old  presenting to be seen for post op check  She is 10 days post op postpartum tubal  She has done well with minimal pain  Having normal bowel and bladder function  Lochia light  No pp blues  Baby has been in hospital with hypothermia but came home yesterday and doing well now     Past Medical History:   Diagnosis Date   • Acid reflux    • Anemia    • Rubella non-immune status, antepartum 2018        Current Outpatient Prescriptions:   •  docusate sodium (COLACE) 100 MG capsule, Take 1 capsule by mouth 2 (Two) Times a Day for 60 doses., Disp: 60 capsule, Rfl: 0  •  ferrous sulfate 325 (65 FE) MG tablet, Take 1 tablet by mouth 2 (Two) Times a Day Before Meals for 60 doses., Disp: 60 tablet, Rfl: 0  •  ibuprofen (ADVIL,MOTRIN) 600 MG tablet, Take 1 tablet by mouth Every 6 (Six) Hours As Needed for Mild Pain  for up to 30 doses., Disp: 60 tablet, Rfl: 0  •  Prenatal Vit-Fe Fumarate-FA (PRENATAL VITAMIN 27-0.8) 27-0.8 MG tablet tablet, Take 1 tablet by mouth Daily., Disp: , Rfl:   •  HYDROcodone-acetaminophen (NORCO) 5-325 MG per tablet, Take 1 tablet by mouth Every 6 (Six) Hours As Needed (pain) for up to 20 doses., Disp: 20 tablet, Rfl: 0  •  pantoprazole (PROTONIX) 40 MG EC tablet, Take 1 tablet by mouth Daily., Disp: 30 tablet, Rfl: 0  •  raNITIdine (ZANTAC) 150 MG tablet, TK 1 T PO BID, Disp: , Rfl: 6   No Known Allergies   Past Surgical History:   Procedure Laterality Date   • MANDIBLE SURGERY     • TUBAL COAGULATION LAPAROSCOPIC Bilateral 10/6/2018    Procedure: POSTPARTUM TUBAL LIGATION;  Surgeon: Tiffany Corona MD;  Location: [facility];  Service: Obstetrics/Gynecology      Social History     Social History   • Marital status:      Spouse name: N/A   • Number of children: N/A   • Years of education: N/A  "    Occupational History   • Not on file.     Social History Main Topics   • Smoking status: Current Every Day Smoker     Packs/day: 0.50   • Smokeless tobacco: Never Used   • Alcohol use No   • Drug use: No   • Sexual activity: Yes     Partners: Male     Birth control/ protection: Surgical     Other Topics Concern   • Not on file     Social History Narrative   • No narrative on file      Family History   Problem Relation Age of Onset   • Hypertension Father    • Coronary artery disease Father    • Hypertension Mother    • Diabetes Mother    • Diabetes Paternal Grandfather        Review of Systems   Constitutional: Positive for fatigue.   Gastrointestinal: Positive for abdominal pain.   Genitourinary: Positive for vaginal bleeding.           Objective   /80   Ht 165.1 cm (65\")   Wt 81.9 kg (180 lb 9.6 oz)   LMP 01/13/2018 (Exact Date)   Breastfeeding? Yes   BMI 30.05 kg/m²     Physical Exam   Constitutional: She appears well-developed and well-nourished. She is cooperative.   Abdominal: Soft. Normal appearance. There is no tenderness. There is no rigidity and no guarding.   Incision healing well   Neurological: She is alert.   Skin: Skin is warm and dry.   Psychiatric: She has a normal mood and affect. Her behavior is normal.            Assessment and Plan  uSzette was seen today for post-op.    Diagnoses and all orders for this visit:    Postop check      Patient Instructions   Continue iron supplement and prenatal vits  Follow up in 4 weeks or prn             This note was electronically signed.    Miley Eid PA-C   October 15, 2018  "

## 2018-11-29 ENCOUNTER — POSTPARTUM VISIT (OUTPATIENT)
Dept: OBSTETRICS AND GYNECOLOGY | Facility: CLINIC | Age: 28
End: 2018-11-29

## 2018-11-29 VITALS
BODY MASS INDEX: 32.32 KG/M2 | DIASTOLIC BLOOD PRESSURE: 66 MMHG | HEIGHT: 65 IN | WEIGHT: 194 LBS | SYSTOLIC BLOOD PRESSURE: 128 MMHG

## 2018-11-29 PROBLEM — Z28.39 RUBELLA NON-IMMUNE STATUS, ANTEPARTUM: Status: RESOLVED | Noted: 2018-07-11 | Resolved: 2018-11-29

## 2018-11-29 PROBLEM — O99.333 TOBACCO SMOKING AFFECTING PREGNANCY IN THIRD TRIMESTER: Status: RESOLVED | Noted: 2018-09-27 | Resolved: 2018-11-29

## 2018-11-29 PROBLEM — Z34.90 PREGNANCY: Status: RESOLVED | Noted: 2018-10-05 | Resolved: 2018-11-29

## 2018-11-29 PROBLEM — O09.899 RUBELLA NON-IMMUNE STATUS, ANTEPARTUM: Status: RESOLVED | Noted: 2018-07-11 | Resolved: 2018-11-29

## 2018-11-29 PROCEDURE — 99024 POSTOP FOLLOW-UP VISIT: CPT | Performed by: MIDWIFE

## 2018-11-29 NOTE — PROGRESS NOTES
"Subjective   Chief Complaint   Patient presents with   • Postpartum Care     delivered on 10/5/18 vaginally by Dr Corona, last pap 3/28/18 WNL, bottle feeding, had PP BTL, no complaints      Suzette Levy is a 28 y.o. year old  presenting to be seen for her postpartum visit.  She had a vaginal delivery with BTL.    Since delivery she has been sexually active.   She does not have concerns about post-partum blues/depression.   She is bottle feeding.  For ongoing contraception, her plans are tubal ligation.  She is unsure if she has had a menstrual cycle.    The following portions of the patient's history were reviewed and updated as appropriate:current medications and allergies    Social History    Tobacco Use      Smoking status: Current Every Day Smoker        Packs/day: 0.50      Smokeless tobacco: Never Used      @ROS@  Review of Systems  Consitutional NEG: anorexia or night sweats    POS: nothing reported   Gastointestinal NEG: bloating, change in bowel habits, melena or reflux symptoms    POS: nothing reported   Genitourinary NEG: dysuria or hematuria    POS: nothing reported   Integument NEG: moles that are changing in size, shape, color or rashes    POS: nothing reported   Breast NEG: persistent breast lump, skin dimpling or nipple discharge    POS: nothing reported        Objective   /66   Ht 165.1 cm (65\")   Wt 88 kg (194 lb)   LMP 2018 (Exact Date)   Breastfeeding? No   BMI 32.28 kg/m²     General:  well developed; well nourished  no acute distress   Abdomen: soft, non-tender; no masses  no umbilical or inginual hernias are present  incision is healed   Pelvis: External genitalia:  normal appearance of the external genitalia including Bartholin's and Foss's glands.  Cervix:  cervical motion tenderness is absent;  Uterus:  normal size, shape and consistency.  Adnexa:  normal bimanual exam of the adnexa.          Assessment   1. Normal 6 week postpartum exam     Plan   1. BC options " reviewed and compared today: tubal ligation  2. Meds were prescribed - no  3. Pap was not done today.  If she does not receive the results of the Pap within 2 weeks  time, she was instructed to call to find out the results.  I explained to Suzette that the recommendations for Pap smear interval in a low risk patient  has lengthened to 3 years time.  I encouraged her to be seen yearly for a full physical exam including breast and pelvic exam even during the off years when PAP's will not be performed.  4. Follow up 1 year    No orders of the defined types were placed in this encounter.         This note was electronically signed.  Tesha Salazar, ARIE  11/29/2018

## 2021-06-03 RX ORDER — DICYCLOMINE HYDROCHLORIDE 10 MG/1
10 CAPSULE ORAL 3 TIMES DAILY PRN
COMMUNITY
End: 2021-06-10

## 2021-06-03 RX ORDER — PANTOPRAZOLE SODIUM 40 MG/1
40 TABLET, DELAYED RELEASE ORAL DAILY
COMMUNITY
End: 2021-06-10 | Stop reason: SDUPTHER

## 2021-06-03 RX ORDER — SUCRALFATE 1 G/1
1 TABLET ORAL 4 TIMES DAILY
COMMUNITY
End: 2021-06-10

## 2021-06-03 RX ORDER — FAMOTIDINE 40 MG/1
40 TABLET, FILM COATED ORAL NIGHTLY PRN
COMMUNITY
End: 2021-07-14 | Stop reason: HOSPADM

## 2021-06-03 RX ORDER — HYOSCYAMINE SULFATE 0.125 MG
0.12 TABLET ORAL EVERY 4 HOURS PRN
COMMUNITY
End: 2021-06-10

## 2021-06-03 RX ORDER — ONDANSETRON 4 MG/1
4 TABLET, ORALLY DISINTEGRATING ORAL EVERY 8 HOURS PRN
COMMUNITY
End: 2022-02-02

## 2021-06-10 ENCOUNTER — OFFICE VISIT (OUTPATIENT)
Dept: GASTROENTEROLOGY | Facility: CLINIC | Age: 31
End: 2021-06-10

## 2021-06-10 VITALS
SYSTOLIC BLOOD PRESSURE: 117 MMHG | OXYGEN SATURATION: 97 % | BODY MASS INDEX: 31.99 KG/M2 | HEART RATE: 87 BPM | RESPIRATION RATE: 20 BRPM | DIASTOLIC BLOOD PRESSURE: 70 MMHG | WEIGHT: 192 LBS | HEIGHT: 65 IN

## 2021-06-10 DIAGNOSIS — K21.9 GASTROESOPHAGEAL REFLUX DISEASE, UNSPECIFIED WHETHER ESOPHAGITIS PRESENT: Primary | ICD-10-CM

## 2021-06-10 DIAGNOSIS — R11.2 INTRACTABLE VOMITING WITH NAUSEA, UNSPECIFIED VOMITING TYPE: ICD-10-CM

## 2021-06-10 DIAGNOSIS — Z01.812 PRE-PROCEDURE LAB EXAM: Primary | ICD-10-CM

## 2021-06-10 DIAGNOSIS — E66.9 CLASS 1 OBESITY WITH BODY MASS INDEX (BMI) OF 31.0 TO 31.9 IN ADULT, UNSPECIFIED OBESITY TYPE, UNSPECIFIED WHETHER SERIOUS COMORBIDITY PRESENT: ICD-10-CM

## 2021-06-10 PROCEDURE — 99204 OFFICE O/P NEW MOD 45 MIN: CPT | Performed by: PHYSICIAN ASSISTANT

## 2021-06-10 RX ORDER — SODIUM CHLORIDE 9 MG/ML
30 INJECTION, SOLUTION INTRAVENOUS CONTINUOUS PRN
Status: CANCELLED | OUTPATIENT
Start: 2021-06-10

## 2021-06-10 RX ORDER — SUCRALFATE 1 G/10ML
SUSPENSION ORAL
COMMUNITY
Start: 2021-06-01 | End: 2021-06-10

## 2021-06-10 RX ORDER — FLUTICASONE PROPIONATE 50 MCG
1 SPRAY, SUSPENSION (ML) NASAL DAILY
COMMUNITY
Start: 2021-05-19

## 2021-06-10 RX ORDER — ONDANSETRON 8 MG/1
TABLET, ORALLY DISINTEGRATING ORAL
COMMUNITY
Start: 2021-05-07 | End: 2021-08-26

## 2021-06-10 RX ORDER — PANTOPRAZOLE SODIUM 40 MG/1
40 TABLET, DELAYED RELEASE ORAL
Qty: 30 TABLET | Refills: 3 | Status: ON HOLD | OUTPATIENT
Start: 2021-06-10 | End: 2021-07-14 | Stop reason: SDUPTHER

## 2021-06-10 RX ORDER — DICYCLOMINE HCL 20 MG
TABLET ORAL
COMMUNITY
Start: 2021-04-09 | End: 2021-06-10

## 2021-06-10 RX ORDER — CETIRIZINE HYDROCHLORIDE 10 MG/1
10 TABLET ORAL DAILY
COMMUNITY
Start: 2021-05-19

## 2021-06-10 NOTE — PROGRESS NOTES
New Patient Consult      Date: 06/10/2021   Patient Name: Suzette Levy  MRN: [id number]  : 1990     Primary Care Provider: Chrissy Senior DO  Referring Provider: Parrish    Chief Complaint   Patient presents with   • Advice Only     peptic ulcer     History of Present Illness: Suzette Levy is a 31 y.o. female who is here today as a consultation with Gastroenterology for evaluation of abdominal pain and nausea.    At the end of 2021, she started having severe chest burning, indigestion, heartburn all worse with eating. She noticed a very temporary improvement then all symptoms came back. She has soreness in her upper abdomen. Has had nausea in the mornings with intermittent vomiting, sometimes with foam only in vomitus. She feels that her symptoms are gradually getting worse again like they were when first present. Has aching in her epigastric region, feels like a hunger pain even when she is not hungry. These symptoms all seemed to come on suddenly, never had acid reflux in the past. No dysphagia. She ran out of protonix approx 2 weeks ago, currently taking pepcid 40 mg once nightly plus carafate tablet daily. She was previously was taking protonix 40 mg in the mornings and that seemed to help. She was seen at [facility] ED for this problem x2 and had imaging and labs completed, was told everything was normal. Had US gb and told normal.     No blood in vomitus. No black stools. Her bowel movements have been some constipated lately, relates to taking the zofran. Typically, she has normal daily stools. Smokes cigarettes and marijuana daily for approx 10-15 years. No alcohol use.     Subjective      Past Medical History:   Diagnosis Date   • Acid reflux    • Anemia    • Rubella non-immune status, antepartum 2018     Past Surgical History:   Procedure Laterality Date   • MANDIBLE SURGERY     • TUBAL COAGULATION LAPAROSCOPIC Bilateral 10/6/2018    Procedure: POSTPARTUM TUBAL LIGATION;  Surgeon:  Tiffany Corona MD;  Location: [facility];  Service: Obstetrics/Gynecology     Family History   Problem Relation Age of Onset   • Hypertension Father    • Coronary artery disease Father    • Hypertension Mother    • Diabetes Mother    • Diabetes Paternal Grandfather      Social History     Socioeconomic History   • Marital status:      Spouse name: Not on file   • Number of children: Not on file   • Years of education: Not on file   • Highest education level: Not on file   Tobacco Use   • Smoking status: Current Every Day Smoker     Packs/day: 0.50   • Smokeless tobacco: Never Used   Substance and Sexual Activity   • Alcohol use: No   • Drug use: No   • Sexual activity: Yes     Partners: Male     Birth control/protection: Surgical     Current Outpatient Medications:   •  cetirizine (zyrTEC) 10 MG tablet, , Disp: , Rfl:   •  famotidine (PEPCID) 40 MG tablet, Take 40 mg by mouth At Night As Needed for Heartburn., Disp: , Rfl:   •  fluticasone (FLONASE) 50 MCG/ACT nasal spray, , Disp: , Rfl:   •  ondansetron ODT (ZOFRAN-ODT) 4 MG disintegrating tablet, Place 4 mg on the tongue Every 8 (Eight) Hours As Needed for Nausea or Vomiting., Disp: , Rfl:   •  ondansetron ODT (ZOFRAN-ODT) 8 MG disintegrating tablet, , Disp: , Rfl:   •  sucralfate (CARAFATE) 1 g tablet, Take 1 g by mouth 4 (Four) Times a Day., Disp: , Rfl:     No Known Allergies    The following portions of the patient's history were reviewed and updated as appropriate: allergies, current medications, past family history, past medical history, past social history, past surgical history and problem list.    Objective     Physical Exam  Vitals reviewed.   Constitutional:       General: She is not in acute distress.     Appearance: Normal appearance. She is well-developed. She is not ill-appearing or diaphoretic.   HENT:      Head: Normocephalic and atraumatic.      Right Ear: External ear normal.      Left Ear: External ear normal.      Nose: Nose normal.       "Mouth/Throat:      Comments: Wearing a mask  Eyes:      General: No scleral icterus.        Right eye: No discharge.         Left eye: No discharge.      Conjunctiva/sclera: Conjunctivae normal.   Neck:      Vascular: No JVD.   Cardiovascular:      Rate and Rhythm: Normal rate and regular rhythm.      Heart sounds: Normal heart sounds. No murmur heard.   No friction rub. No gallop.    Pulmonary:      Effort: Pulmonary effort is normal. No respiratory distress.      Breath sounds: Normal breath sounds. No wheezing or rales.   Chest:      Chest wall: No tenderness.   Abdominal:      General: Bowel sounds are normal. There is no distension.      Palpations: Abdomen is soft. There is no mass.      Tenderness: There is no abdominal tenderness. There is no guarding.   Musculoskeletal:         General: No deformity. Normal range of motion.      Cervical back: Normal range of motion.   Skin:     General: Skin is warm and dry.      Findings: No erythema or rash.   Neurological:      Mental Status: She is alert and oriented to person, place, and time.      Coordination: Coordination normal.   Psychiatric:         Mood and Affect: Mood normal.         Behavior: Behavior normal.         Thought Content: Thought content normal.         Judgment: Judgment normal.       Vital Signs:   Vitals:    06/10/21 1428   BP: 117/70   Pulse: 87   Resp: 20   SpO2: 97%   Weight: 87.1 kg (192 lb)   Height: 165.1 cm (65\")     Results Review:   No recent labs or imaging available to review    Assessment / Plan      1. Gastroesophageal reflux disease, unspecified whether esophagitis present  6/10/2021  She has been having severe reflux symptoms for the past 3 months. She did not have any GERD symptoms prior to her first severe episode in March 2021. No dysphagia. Has been taking Pepcid 40 mg once nightly without relief plus carafate daily. I have asked her to stop carafate for now. Start Protonix 40 mg once daily 30 minutes before a meal. Can " continue Pepcid 40 mg at night if needed for relief of GERD. She was instructed not to lie down immediately after eating (wait at least 3 hours after meals), elevate the head of the bed at night, avoid spicy foods, avoid mints, avoid caffeine, avoid nicotine and work on getting to a healthy weight.   - pantoprazole (PROTONIX) 40 MG EC tablet; Take 1 tablet by mouth Every Morning Before Breakfast.  Dispense: 30 tablet; Refill: 3    She will need an esophagogastroduodenoscopy performed with monitored anesthesia care. The indications, technique, alternatives and potential risk and complications were discussed with the patient including but not limited to bleeding, intestinal perforations, missing lesions and anesthetic complications. The patient understands and wishes to proceed with the procedure and has given their verbal consent. Written patient education information was given to the patient. She should follow up in the office after this procedure to discuss the results and further recommendations can be made at that time. The patient will call if they have further questions before procedure.  - sodium chloride 0.9 % infusion  - Case Request    2. Intractable vomiting with nausea, unspecified vomiting type  6/10/2021  She has had nausea and vomiting for the past 3 months. It is worse in the mornings and sometimes only contains liquid material or foam. Vomiting is not in association with a meal. She does admit smoking marijuana for years. I have asked her to stop marijuana use because this could be contributing. Had US gb completed recently and was told this was normal. She was seen at [facility] ED x2 in the past 3 months and has had several labs and imaging there. We will request those results for review. EGD will be completed for evaluation.     3. Class 1 obesity with body mass index (BMI) of 31.0 to 31.9 in adult, unspecified obesity type, unspecified whether serious comorbidity present  6/10/2021  Her BMI is  currently 31. No recent labs for review. Being obese puts her at increased risk for several health problems including fatty liver disease. She should work on diet modification to lose 19 lbs in the next 6 months.         Follow Up:   Return for follow up after procedure to discuss results.      Roxanna Wallace PA-C  [facility]  6/11/2021  09:40 EDT    Please note that portions of this note may have been completed with a voice recognition program. Efforts were made to edit the dictations, but occasionally words are mistranscribed.

## 2021-06-11 PROBLEM — K21.9 GASTROESOPHAGEAL REFLUX DISEASE: Status: ACTIVE | Noted: 2021-06-11

## 2021-06-11 PROBLEM — R11.10 INTRACTABLE VOMITING: Status: ACTIVE | Noted: 2021-06-11

## 2021-06-25 ENCOUNTER — APPOINTMENT (OUTPATIENT)
Dept: CT IMAGING | Facility: HOSPITAL | Age: 31
End: 2021-06-25

## 2021-06-25 ENCOUNTER — HOSPITAL ENCOUNTER (EMERGENCY)
Facility: HOSPITAL | Age: 31
Discharge: HOME | End: 2021-06-25
Attending: EMERGENCY MEDICINE | Admitting: EMERGENCY MEDICINE

## 2021-06-25 VITALS
HEIGHT: 65 IN | TEMPERATURE: 98 F | HEART RATE: 58 BPM | DIASTOLIC BLOOD PRESSURE: 94 MMHG | RESPIRATION RATE: 18 BRPM | WEIGHT: 186.8 LBS | BODY MASS INDEX: 31.12 KG/M2 | SYSTOLIC BLOOD PRESSURE: 158 MMHG | OXYGEN SATURATION: 98 %

## 2021-06-25 DIAGNOSIS — R10.13 EPIGASTRIC ABDOMINAL PAIN: Primary | ICD-10-CM

## 2021-06-25 LAB
ALBUMIN SERPL-MCNC: 4.4 G/DL (ref 3.5–5.2)
ALBUMIN/GLOB SERPL: 1.3 G/DL
ALP SERPL-CCNC: 72 U/L (ref 39–117)
ALT SERPL W P-5'-P-CCNC: 10 U/L (ref 1–33)
ANION GAP SERPL CALCULATED.3IONS-SCNC: 15.9 MMOL/L (ref 5–15)
AST SERPL-CCNC: 15 U/L (ref 1–32)
BASOPHILS # BLD AUTO: 0.07 10*3/MM3 (ref 0–0.2)
BASOPHILS NFR BLD AUTO: 0.4 % (ref 0–1.5)
BILIRUB SERPL-MCNC: 0.3 MG/DL (ref 0–1.2)
BUN SERPL-MCNC: 8 MG/DL (ref 6–20)
BUN/CREAT SERPL: 10.5 (ref 7–25)
CALCIUM SPEC-SCNC: 9.4 MG/DL (ref 8.6–10.5)
CHLORIDE SERPL-SCNC: 96 MMOL/L (ref 98–107)
CO2 SERPL-SCNC: 25.1 MMOL/L (ref 22–29)
CREAT SERPL-MCNC: 0.76 MG/DL (ref 0.57–1)
DEPRECATED RDW RBC AUTO: 46.1 FL (ref 37–54)
EOSINOPHIL # BLD AUTO: 0 10*3/MM3 (ref 0–0.4)
EOSINOPHIL NFR BLD AUTO: 0 % (ref 0.3–6.2)
ERYTHROCYTE [DISTWIDTH] IN BLOOD BY AUTOMATED COUNT: 13.8 % (ref 12.3–15.4)
GFR SERPL CREATININE-BSD FRML MDRD: 88 ML/MIN/1.73
GLOBULIN UR ELPH-MCNC: 3.5 GM/DL
GLUCOSE SERPL-MCNC: 111 MG/DL (ref 65–99)
HCT VFR BLD AUTO: 43.9 % (ref 34–46.6)
HGB BLD-MCNC: 14.8 G/DL (ref 12–15.9)
IMM GRANULOCYTES # BLD AUTO: 0.07 10*3/MM3 (ref 0–0.05)
IMM GRANULOCYTES NFR BLD AUTO: 0.4 % (ref 0–0.5)
LIPASE SERPL-CCNC: 13 U/L (ref 13–60)
LYMPHOCYTES # BLD AUTO: 1.36 10*3/MM3 (ref 0.7–3.1)
LYMPHOCYTES NFR BLD AUTO: 8.7 % (ref 19.6–45.3)
MCH RBC QN AUTO: 30.6 PG (ref 26.6–33)
MCHC RBC AUTO-ENTMCNC: 33.7 G/DL (ref 31.5–35.7)
MCV RBC AUTO: 90.7 FL (ref 79–97)
MONOCYTES # BLD AUTO: 0.68 10*3/MM3 (ref 0.1–0.9)
MONOCYTES NFR BLD AUTO: 4.4 % (ref 5–12)
NEUTROPHILS NFR BLD AUTO: 13.43 10*3/MM3 (ref 1.7–7)
NEUTROPHILS NFR BLD AUTO: 86.1 % (ref 42.7–76)
NRBC BLD AUTO-RTO: 0 /100 WBC (ref 0–0.2)
PLATELET # BLD AUTO: 313 10*3/MM3 (ref 140–450)
PMV BLD AUTO: 10 FL (ref 6–12)
POTASSIUM SERPL-SCNC: 3.5 MMOL/L (ref 3.5–5.2)
PROT SERPL-MCNC: 7.9 G/DL (ref 6–8.5)
RBC # BLD AUTO: 4.84 10*6/MM3 (ref 3.77–5.28)
SODIUM SERPL-SCNC: 137 MMOL/L (ref 136–145)
WBC # BLD AUTO: 15.61 10*3/MM3 (ref 3.4–10.8)

## 2021-06-25 PROCEDURE — 25010000002 ONDANSETRON PER 1 MG: Performed by: EMERGENCY MEDICINE

## 2021-06-25 PROCEDURE — 96376 TX/PRO/DX INJ SAME DRUG ADON: CPT

## 2021-06-25 PROCEDURE — 83690 ASSAY OF LIPASE: CPT | Performed by: EMERGENCY MEDICINE

## 2021-06-25 PROCEDURE — 96374 THER/PROPH/DIAG INJ IV PUSH: CPT

## 2021-06-25 PROCEDURE — 85025 COMPLETE CBC W/AUTO DIFF WBC: CPT | Performed by: EMERGENCY MEDICINE

## 2021-06-25 PROCEDURE — 96375 TX/PRO/DX INJ NEW DRUG ADDON: CPT

## 2021-06-25 PROCEDURE — 99284 EMERGENCY DEPT VISIT MOD MDM: CPT

## 2021-06-25 PROCEDURE — 80053 COMPREHEN METABOLIC PANEL: CPT | Performed by: EMERGENCY MEDICINE

## 2021-06-25 PROCEDURE — 74176 CT ABD & PELVIS W/O CONTRAST: CPT

## 2021-06-25 PROCEDURE — 25010000002 MORPHINE PER 10 MG: Performed by: EMERGENCY MEDICINE

## 2021-06-25 RX ORDER — MORPHINE SULFATE 4 MG/ML
4 INJECTION, SOLUTION INTRAMUSCULAR; INTRAVENOUS ONCE
Status: COMPLETED | OUTPATIENT
Start: 2021-06-25 | End: 2021-06-25

## 2021-06-25 RX ORDER — ONDANSETRON 2 MG/ML
4 INJECTION INTRAMUSCULAR; INTRAVENOUS ONCE
Status: COMPLETED | OUTPATIENT
Start: 2021-06-25 | End: 2021-06-25

## 2021-06-25 RX ORDER — FAMOTIDINE 10 MG/ML
20 INJECTION, SOLUTION INTRAVENOUS ONCE
Status: COMPLETED | OUTPATIENT
Start: 2021-06-25 | End: 2021-06-25

## 2021-06-25 RX ORDER — SODIUM CHLORIDE 0.9 % (FLUSH) 0.9 %
10 SYRINGE (ML) INJECTION AS NEEDED
Status: DISCONTINUED | OUTPATIENT
Start: 2021-06-25 | End: 2021-06-26 | Stop reason: HOSPADM

## 2021-06-25 RX ADMIN — MORPHINE SULFATE 4 MG: 4 INJECTION, SOLUTION INTRAMUSCULAR; INTRAVENOUS at 23:21

## 2021-06-25 RX ADMIN — SODIUM CHLORIDE 1000 ML: 9 INJECTION, SOLUTION INTRAVENOUS at 20:56

## 2021-06-25 RX ADMIN — MORPHINE SULFATE 4 MG: 4 INJECTION, SOLUTION INTRAMUSCULAR; INTRAVENOUS at 21:21

## 2021-06-25 RX ADMIN — FAMOTIDINE 20 MG: 10 INJECTION INTRAVENOUS at 20:56

## 2021-06-25 RX ADMIN — LIDOCAINE HYDROCHLORIDE: 20 SOLUTION ORAL; TOPICAL at 20:59

## 2021-06-25 RX ADMIN — ONDANSETRON 4 MG: 2 INJECTION INTRAMUSCULAR; INTRAVENOUS at 21:15

## 2021-07-13 ENCOUNTER — LAB (OUTPATIENT)
Dept: LAB | Facility: HOSPITAL | Age: 31
End: 2021-07-13

## 2021-07-13 DIAGNOSIS — Z01.818 PREOP TESTING: Primary | ICD-10-CM

## 2021-07-13 DIAGNOSIS — Z01.818 PREOP TESTING: ICD-10-CM

## 2021-07-13 LAB — SARS-COV-2 RNA PNL SPEC NAA+PROBE: NOT DETECTED

## 2021-07-13 PROCEDURE — C9803 HOPD COVID-19 SPEC COLLECT: HCPCS

## 2021-07-13 PROCEDURE — 87635 SARS-COV-2 COVID-19 AMP PRB: CPT

## 2021-07-14 ENCOUNTER — HOSPITAL ENCOUNTER (OUTPATIENT)
Facility: HOSPITAL | Age: 31
Setting detail: HOSPITAL OUTPATIENT SURGERY
Discharge: HOME | End: 2021-07-14
Attending: INTERNAL MEDICINE | Admitting: INTERNAL MEDICINE

## 2021-07-14 ENCOUNTER — ANESTHESIA (OUTPATIENT)
Dept: GASTROENTEROLOGY | Facility: HOSPITAL | Age: 31
End: 2021-07-14

## 2021-07-14 ENCOUNTER — ANESTHESIA EVENT (OUTPATIENT)
Dept: GASTROENTEROLOGY | Facility: HOSPITAL | Age: 31
End: 2021-07-14

## 2021-07-14 VITALS
RESPIRATION RATE: 18 BRPM | BODY MASS INDEX: 31.16 KG/M2 | SYSTOLIC BLOOD PRESSURE: 153 MMHG | DIASTOLIC BLOOD PRESSURE: 82 MMHG | WEIGHT: 187 LBS | HEIGHT: 65 IN | TEMPERATURE: 98.8 F | HEART RATE: 66 BPM | OXYGEN SATURATION: 100 %

## 2021-07-14 DIAGNOSIS — K21.9 GASTROESOPHAGEAL REFLUX DISEASE, UNSPECIFIED WHETHER ESOPHAGITIS PRESENT: ICD-10-CM

## 2021-07-14 DIAGNOSIS — R11.2 INTRACTABLE VOMITING WITH NAUSEA, UNSPECIFIED VOMITING TYPE: ICD-10-CM

## 2021-07-14 LAB
B-HCG UR QL: NEGATIVE
INTERNAL NEGATIVE CONTROL: NEGATIVE
INTERNAL POSITIVE CONTROL: POSITIVE
Lab: NORMAL

## 2021-07-14 PROCEDURE — 43239 EGD BIOPSY SINGLE/MULTIPLE: CPT | Performed by: INTERNAL MEDICINE

## 2021-07-14 PROCEDURE — 25010000002 ONDANSETRON PER 1 MG: Performed by: NURSE ANESTHETIST, CERTIFIED REGISTERED

## 2021-07-14 PROCEDURE — 81025 URINE PREGNANCY TEST: CPT | Performed by: INTERNAL MEDICINE

## 2021-07-14 PROCEDURE — 25010000002 PROPOFOL 10 MG/ML EMULSION: Performed by: NURSE ANESTHETIST, CERTIFIED REGISTERED

## 2021-07-14 RX ORDER — PANTOPRAZOLE SODIUM 40 MG/1
40 TABLET, DELAYED RELEASE ORAL 2 TIMES DAILY
Qty: 60 TABLET | Refills: 2 | Status: SHIPPED | OUTPATIENT
Start: 2021-07-14 | End: 2021-08-16 | Stop reason: SDUPTHER

## 2021-07-14 RX ORDER — KETAMINE HYDROCHLORIDE 50 MG/ML
INJECTION, SOLUTION, CONCENTRATE INTRAMUSCULAR; INTRAVENOUS AS NEEDED
Status: DISCONTINUED | OUTPATIENT
Start: 2021-07-14 | End: 2021-07-14 | Stop reason: SURG

## 2021-07-14 RX ORDER — METOCLOPRAMIDE 5 MG/1
5 TABLET ORAL 3 TIMES DAILY
Qty: 41 TABLET | Refills: 0 | Status: SHIPPED | OUTPATIENT
Start: 2021-07-14 | End: 2021-08-26

## 2021-07-14 RX ORDER — ONDANSETRON 2 MG/ML
INJECTION INTRAMUSCULAR; INTRAVENOUS AS NEEDED
Status: DISCONTINUED | OUTPATIENT
Start: 2021-07-14 | End: 2021-07-14 | Stop reason: SURG

## 2021-07-14 RX ORDER — LIDOCAINE HYDROCHLORIDE 20 MG/ML
INJECTION, SOLUTION INTRAVENOUS AS NEEDED
Status: DISCONTINUED | OUTPATIENT
Start: 2021-07-14 | End: 2021-07-14 | Stop reason: SURG

## 2021-07-14 RX ORDER — SODIUM CHLORIDE 9 MG/ML
30 INJECTION, SOLUTION INTRAVENOUS CONTINUOUS PRN
Status: DISCONTINUED | OUTPATIENT
Start: 2021-07-14 | End: 2021-07-14 | Stop reason: HOSPADM

## 2021-07-14 RX ORDER — PROPOFOL 10 MG/ML
VIAL (ML) INTRAVENOUS AS NEEDED
Status: DISCONTINUED | OUTPATIENT
Start: 2021-07-14 | End: 2021-07-14 | Stop reason: SURG

## 2021-07-14 RX ADMIN — LIDOCAINE HYDROCHLORIDE 40 MG: 20 INJECTION, SOLUTION INTRAVENOUS at 08:55

## 2021-07-14 RX ADMIN — SODIUM CHLORIDE 30 ML/HR: 9 INJECTION, SOLUTION INTRAVENOUS at 08:14

## 2021-07-14 RX ADMIN — ONDANSETRON 4 MG: 2 INJECTION INTRAMUSCULAR; INTRAVENOUS at 08:55

## 2021-07-14 RX ADMIN — PROPOFOL 100 MG: 10 INJECTION, EMULSION INTRAVENOUS at 08:55

## 2021-07-14 RX ADMIN — KETAMINE HYDROCHLORIDE 15 MG: 50 INJECTION, SOLUTION INTRAMUSCULAR; INTRAVENOUS at 08:55

## 2021-07-14 RX ADMIN — ONDANSETRON 4 MG: 2 INJECTION INTRAMUSCULAR; INTRAVENOUS at 08:45

## 2021-07-14 RX ADMIN — PROPOFOL 200 MG: 10 INJECTION, EMULSION INTRAVENOUS at 08:59

## 2021-07-14 NOTE — DISCHARGE INSTRUCTIONS
- Discharge patient to home (ambulatory).   - Low fiber diet.   - Follow an antireflux regimen.   - PPI BID for 8-12 weeks   - Reglan 5mg po TID for 2 weeks ( hold if any involtary movements d/w pt)  - Absolute abstinence from Smoking cigarett  and marijuana  - Await pathology results.   - Repeat upper endoscopy in 6 months for surveillance.   - Return to GI office in 6 weeks.     Please follow all post op instructions and follow up appointment time from your physician's office included in your discharge packet.    REST TODAY    No pushing, pulling, tugging,  heavy lifting, or strenuous activity.  No major decision making, driving, or drinking alcoholic beverages for 24 hours. ( due to the medications you have  received)  Always use good hand hygiene/washing techniques.  NO driving while taking pain medications.    To assist you in voiding:  Drink plenty of fluids  Listen to running water while attempting to void.    If you are unable to urinate and you have an uncomfortable urge to void or it has been   6 hours since you were discharged, return to the Emergency Room

## 2021-07-14 NOTE — ANESTHESIA POSTPROCEDURE EVALUATION
Patient: Suzette Levy    Procedure Summary     Date: 07/14/21 Room / Location: [facility] ENDOSCOPY 1 / [facility] ENDOSCOPY    Anesthesia Start: 0845 Anesthesia Stop:     Procedure: ESOPHAGOGASTRODUODENOSCOPY WITH BIOPSY CPT CODE: 28886 (N/A ) Diagnosis:       Gastroesophageal reflux disease, unspecified whether esophagitis present      Intractable vomiting with nausea, unspecified vomiting type      (Gastroesophageal reflux disease, unspecified whether esophagitis present [K21.9])      (Intractable vomiting with nausea, unspecified vomiting type [R11.2])    Surgeons: Good Worthington MD Provider: Vicente Salazar CRNA    Anesthesia Type: MAC ASA Status: 2          Anesthesia Type: MAC    Vitals  HR 94  Sat 93  Resp 21  /56  Temp 98      Post Anesthesia Care and Evaluation    Patient location during evaluation: bedside  Patient participation: complete - patient participated  Level of consciousness: awake and alert and sleepy but conscious  Pain score: 0  Pain management: adequate  Airway patency: patent  Anesthetic complications: No anesthetic complications  PONV Status: none  Cardiovascular status: acceptable  Respiratory status: acceptable and nasal cannula  Hydration status: acceptable

## 2021-07-14 NOTE — H&P
[facility]  HISTORY AND PHYSICAL    Patient Name: uSzette Levy  : 1990  MRN: [id number]    Chief Complaint:   For EGD    History Of Presenting Illness:    GERD  Nausea vomiting    Past Medical History:   Diagnosis Date   • Acid reflux    • Anemia    • Constipation    • Diarrhea    • Nausea & vomiting    • Peptic ulcer    • Rubella non-immune status, antepartum 2018   • Stomach pain        Past Surgical History:   Procedure Laterality Date   • MANDIBLE SURGERY     • TUBAL COAGULATION LAPAROSCOPIC Bilateral 10/6/2018    Procedure: POSTPARTUM TUBAL LIGATION;  Surgeon: Tiffany Corona MD;  Location: [facility];  Service: Obstetrics/Gynecology       Social History     Socioeconomic History   • Marital status:      Spouse name: Not on file   • Number of children: Not on file   • Years of education: Not on file   • Highest education level: Not on file   Tobacco Use   • Smoking status: Current Every Day Smoker     Packs/day: 1.00   • Smokeless tobacco: Never Used   Vaping Use   • Vaping Use: Never used   Substance and Sexual Activity   • Alcohol use: No   • Drug use: No   • Sexual activity: Yes     Partners: Male     Birth control/protection: Surgical       Family History   Problem Relation Age of Onset   • Hypertension Father    • Coronary artery disease Father    • Hypertension Mother    • Diabetes Mother    • Diabetes Paternal Grandfather        Prior to Admission Medications:  Medications Prior to Admission   Medication Sig Dispense Refill Last Dose   • cetirizine (zyrTEC) 10 MG tablet    Past Week at Unknown time   • famotidine (PEPCID) 40 MG tablet Take 40 mg by mouth At Night As Needed for Heartburn.   Past Week at Unknown time   • fluticasone (FLONASE) 50 MCG/ACT nasal spray    Past Week at Unknown time   • ondansetron ODT (ZOFRAN-ODT) 4 MG disintegrating tablet Place 4 mg on the tongue Every 8 (Eight) Hours As Needed for Nausea or Vomiting.   2021 at 0300   • ondansetron  ODT (ZOFRAN-ODT) 8 MG disintegrating tablet       • pantoprazole (PROTONIX) 40 MG EC tablet Take 1 tablet by mouth Every Morning Before Breakfast. 30 tablet 3 Past Week at Unknown time       Allergies:  No Known Allergies     Vitals: Temp:  [98.4 °F (36.9 °C)] 98.4 °F (36.9 °C)  Heart Rate:  [62] 62  Resp:  [18] 18  BP: (160)/(100) 160/100    Review Of Systems:  Constitutional:  Negative for chills, fever, and unexpected weight change.  Respiratory:  Negative for cough, chest tightness, shortness of breath, and wheezing.  Cardiovascular:  Negative for chest pain, palpitations, and leg swelling.  Gastrointestinal:  Negative for abdominal distention, abdominal pain, Nausea, vomiting.  Neurological:  Negative for Weakness, numbness, and headaches.     Physical Exam:    General Appearance:  Alert, cooperative, in no acute distress.   Lungs:   Clear to auscultation, respirations regular, even and                 unlabored.   Heart:  Regular rhythm and normal rate.   Abdomen:   Normal bowel sounds, no masses, no organomegaly. Soft, non-tender, non-distended   Neurologic: Alert and oriented x 3. Moves all four limbs equally       Plan: ESOPHAGOGASTRODUODENOSCOPY WITH BIOPSY CPT CODE: 46528 (N/A)     Good Worthington MD  7/14/2021

## 2021-07-14 NOTE — ANESTHESIA PREPROCEDURE EVALUATION
Anesthesia Evaluation     Patient summary reviewed and Nursing notes reviewed   no history of anesthetic complications:  NPO Solid Status: > 8 hours  NPO Liquid Status: > 8 hours           Airway   Mallampati: I  TM distance: >3 FB  Neck ROM: full  no difficulty expected  Dental - normal exam     Pulmonary - normal exam   (+) a smoker Current Smoked day of surgery, shortness of breath,   Cardiovascular - normal exam  Exercise tolerance: good (4-7 METS)    Rhythm: regular  Rate: normal    (+) GUERRERO,       Neuro/Psych- negative ROS  GI/Hepatic/Renal/Endo    (+) obesity,  GERD,      Musculoskeletal (-) negative ROS    Abdominal  - normal exam    Abdomen: soft.  Bowel sounds: normal.   Substance History   (+) drug use     OB/GYN    (-)  Pregnant        Other - negative ROS                       Anesthesia Plan    ASA 2     MAC   (Risks and benefits discussed including risk of aspiration, recall and dental damage. All patient questions answered. Will continue with POC.)  intravenous induction     Anesthetic plan, all risks, benefits, and alternatives have been provided, discussed and informed consent has been obtained with: patient.

## 2021-07-19 LAB
LAB AP CASE REPORT: NORMAL
PATH REPORT.FINAL DX SPEC: NORMAL

## 2021-08-16 ENCOUNTER — TELEPHONE (OUTPATIENT)
Dept: GASTROENTEROLOGY | Facility: CLINIC | Age: 31
End: 2021-08-16

## 2021-08-16 DIAGNOSIS — K21.9 GASTROESOPHAGEAL REFLUX DISEASE, UNSPECIFIED WHETHER ESOPHAGITIS PRESENT: ICD-10-CM

## 2021-08-16 RX ORDER — PANTOPRAZOLE SODIUM 40 MG/1
40 TABLET, DELAYED RELEASE ORAL 2 TIMES DAILY
Qty: 60 TABLET | Refills: 1 | Status: SHIPPED | OUTPATIENT
Start: 2021-08-16 | End: 2021-09-10 | Stop reason: SDUPTHER

## 2021-08-16 NOTE — TELEPHONE ENCOUNTER
Patient called for refills on Protonix.  It looks like from the EGD report that she is to take BID dose for 8-2 weeks.

## 2021-08-26 ENCOUNTER — OFFICE VISIT (OUTPATIENT)
Dept: GASTROENTEROLOGY | Facility: CLINIC | Age: 31
End: 2021-08-26

## 2021-08-26 VITALS
HEART RATE: 99 BPM | WEIGHT: 180 LBS | DIASTOLIC BLOOD PRESSURE: 95 MMHG | TEMPERATURE: 98.6 F | BODY MASS INDEX: 28.93 KG/M2 | SYSTOLIC BLOOD PRESSURE: 139 MMHG | HEIGHT: 66 IN

## 2021-08-26 DIAGNOSIS — R11.2 INTRACTABLE VOMITING WITH NAUSEA, UNSPECIFIED VOMITING TYPE: ICD-10-CM

## 2021-08-26 DIAGNOSIS — K21.00 GASTROESOPHAGEAL REFLUX DISEASE WITH ESOPHAGITIS WITHOUT HEMORRHAGE: Primary | ICD-10-CM

## 2021-08-26 PROCEDURE — 99214 OFFICE O/P EST MOD 30 MIN: CPT | Performed by: PHYSICIAN ASSISTANT

## 2021-08-26 NOTE — PATIENT INSTRUCTIONS
Food Choices for Gastroesophageal Reflux Disease, Adult  When you have gastroesophageal reflux disease (GERD), the foods you eat and your eating habits are very important. Choosing the right foods can help ease the discomfort of GERD. Consider working with a dietitian to help you make healthy food choices.  What are tips for following this plan?  Reading food labels  · Read the label for foods that are low in saturated fat. Foods that have less than 5% of daily value (DV) of fat and 0 g of trans fats may help with your symptoms.  Cooking  · Cook foods using methods other than frying. This may include baking, steaming, grilling, or broiling. These are all methods that do not need a lot of fat for cooking.  · To add flavor, try to use herbs that are low in spice and acidity.  Meal planning    · Choose healthy foods that are low in fat, such as fruits, vegetables, whole grains, low-fat dairy products, lean meats, fish, and poultry.  · Eat frequent, small meals instead of three large meals each day. Eat your meals slowly, in a relaxed setting. Avoid bending over or lying down until 2-3 hours after eating.  · Limit high-fat foods such as fatty meats or fried foods.  · Limit your intake of oils, butter, and shortening to less than 8 teaspoons each day.  · Avoid the following:  ? Foods that cause symptoms. These may be different for different people. Keep a food diary to keep track of foods that cause symptoms.  ? Alcohol.  ? Drinking large amounts of liquid with meals.  ? Eating meals during the 2-3 hours before bed.  Lifestyle  · Maintain a healthy weight. Ask your health care provider what weight is healthy for you. If you need to lose weight, work with your health care provider to do so safely.  · Exercise for at least 30 minutes on 5 or more days each week, or as told by your health care provider.  · Avoid wearing clothes that fit tightly around your waist and chest.  · Do not use any products that contain nicotine or  tobacco, such as cigarettes, e-cigarettes, and chewing tobacco. If you need help quitting, ask your health care provider.  · Sleep with the head of your bed raised. Use a wedge under the mattress or blocks under the bed frame to raise the head of the bed.  What foods should I eat?    Eat a healthy, well-balanced diet of fruits, vegetables, whole grains, low-fat dairy products, lean meats, fish, and poultry. Each person is different. Foods that may trigger symptoms in one person may not trigger any symptoms in another person. Work with your health care provider to identify foods that are safe for you.  The items listed above may not be a complete list of foods and beverages you can eat. Contact a dietitian for more information.  What foods should I avoid?  Limiting some of these foods may help in managing the symptoms of GERD. Everyone is different. Consult a dietitian or your health care provider to help you identify the exact foods to avoid, if any.  Fruits  Any fruits prepared with added fat. Any fruits that cause symptoms. For some people this may include citrus fruits, such as oranges, grapefruit, pineapple, and fritz.  Vegetables  Deep-fried vegetables. French fries. Any vegetables prepared with added fat. Any vegetables that cause symptoms. For some people, this may include tomatoes and tomato products, chili peppers, onions and garlic, and horseradish.  Grains  Pastries or quick breads with added fat.  Meats and other proteins  High-fat meats, such as fatty beef or pork, hot dogs, ribs, ham, sausage, salami, and gastelum. Fried meat or protein, including fried fish and fried chicken. Nuts and nut butters.  Dairy  Whole milk and chocolate milk. Sour cream. Cream. Ice cream. Cream cheese. Milkshakes.  Fats and oils  Butter. Margarine. Shortening. Ghee.  Beverages  Coffee and tea, with or without caffeine. Carbonated beverages. Sodas. Energy drinks. Fruit juice made with acidic fruits (such as orange or  grapefruit). Tomato juice. Alcoholic drinks.  Sweets and desserts  Chocolate and cocoa. Donuts.  Seasonings and condiments  Pepper. Peppermint and spearmint. Added salt. Any condiments, herbs, or seasonings that cause symptoms. For some people, this may include hunter, hot sauce, or vinegar-based salad dressings.  The items listed above may not be a complete list of foods and beverages you should avoid. Contact a dietitian for more information.  Questions to ask your health care provider  Diet and lifestyle changes are usually the first steps that are taken to manage symptoms of GERD. If diet and lifestyle changes do not improve your symptoms, talk with your health care provider about taking medicines.  Where to find more information  · [employer] G[employer]intestinal [employer]: aboutgerd.org  Summary  · When you have gastroesophageal reflux disease (GERD), food and lifestyle choices may be very helpful in easing the discomfort of GERD.  · Eat frequent, small meals instead of three large meals each day. Eat your meals slowly, in a relaxed setting. Avoid bending over or lying down until 2-3 hours after eating.  · Limit high-fat foods such as fatty meat or fried foods.  This information is not intended to replace advice given to you by your health care provider. Make sure you discuss any questions you have with your health care provider.  Document Revised: 10/12/2020 Document Reviewed: 10/12/2020  Elsevier Patient Education © 2021 [employer]

## 2021-08-26 NOTE — PROGRESS NOTES
Follow Up Note     Date: 2021   Patient Name: Suzette Levy  MRN: [id number]  : 1990     Primary Care Provider: Chrissy Senior DO     Chief Complaint   Patient presents with   • Follow-up     post EGD   • Heartburn   • Nausea   • Vomiting     History of present illness:   2021  Suzette Levy is a 31 y.o. female who is here today for follow up regarding recent EGD, Heartburn, Nausea, and Vomiting.    Since her last visit approximately 2 months ago, she has noticed gradual improvements, taking PPI BID as prescribed, finished the reglan course.  She felt better while taking Reglan and felt that her food digested quicker while taking it which lessened her abdominal discomfort and nausea.  She is still taking Zofran intermittently with nausea severe.  She is no longer having vomiting daily.  She is only having severe symptoms of vomiting at the time of her menstrual cycle.  She has not seen a gynecologist recently.  She has cut back on smoking dramatically since last visit.  She has 1 cup of coffee in the mornings, no other caffeine.  She has been more careful with her diet.  She would like to discuss recent EGD results.    Interval History:  6/10/2021  At the end of 2021, she started having severe chest burning, indigestion, heartburn all worse with eating. She noticed a very temporary improvement then all symptoms came back. She has soreness in her upper abdomen. Has had nausea in the mornings with intermittent vomiting, sometimes with foam only in vomitus. She feels that her symptoms are gradually getting worse again like they were when first present. Has aching in her epigastric region, feels like a hunger pain even when she is not hungry. These symptoms all seemed to come on suddenly, never had acid reflux in the past. No dysphagia. She ran out of protonix approx 2 weeks ago, currently taking pepcid 40 mg once nightly plus carafate tablet daily. She was previously was taking protonix  40 mg in the mornings and that seemed to help. She was seen at [facility] ED for this problem x2 and had imaging and labs completed, was told everything was normal. Had US gb and told normal.      No blood in vomitus. No black stools. Her bowel movements have been some constipated lately, relates to taking the zofran. Typically, she has normal daily stools. Smokes cigarettes and marijuana daily for approx 10-15 years. No alcohol use.     Subjective     Past Medical History:   Diagnosis Date   • Acid reflux    • Anemia    • Constipation    • Diarrhea    • Nausea & vomiting    • Peptic ulcer    • Rubella non-immune status, antepartum 07/11/2018   • Stomach pain      Past Surgical History:   Procedure Laterality Date   • ENDOSCOPY N/A 7/14/2021    Procedure: ESOPHAGOGASTRODUODENOSCOPY WITH BIOPSY CPT CODE: 78226;  Surgeon: Good Worthington MD;  Location: [facility] ENDOSCOPY;  Service: Gastroenterology;  Laterality: N/A;   • MANDIBLE SURGERY     • TUBAL COAGULATION LAPAROSCOPIC Bilateral 10/6/2018    Procedure: POSTPARTUM TUBAL LIGATION;  Surgeon: Tiffany Corona MD;  Location: [facility] OR;  Service: Obstetrics/Gynecology     Family History   Problem Relation Age of Onset   • Hypertension Father    • Coronary artery disease Father    • Hypertension Mother    • Diabetes Mother    • Diabetes Paternal Grandfather    • Colon cancer Neg Hx      Social History     Socioeconomic History   • Marital status:      Spouse name: Not on file   • Number of children: Not on file   • Years of education: Not on file   • Highest education level: Not on file   Tobacco Use   • Smoking status: Current Every Day Smoker     Packs/day: 0.25     Types: Cigarettes     Start date: 2002   • Smokeless tobacco: Never Used   Vaping Use   • Vaping Use: Never used   Substance and Sexual Activity   • Alcohol use: No   • Drug use: No   • Sexual activity: Defer     Current Outpatient Medications:   •  cetirizine (zyrTEC) 10 MG tablet, , Disp: , Rfl:    •  fluticasone (FLONASE) 50 MCG/ACT nasal spray, , Disp: , Rfl:   •  ondansetron ODT (ZOFRAN-ODT) 4 MG disintegrating tablet, Place 4 mg on the tongue Every 8 (Eight) Hours As Needed for Nausea or Vomiting., Disp: , Rfl:   •  pantoprazole (PROTONIX) 40 MG EC tablet, Take 1 tablet by mouth 2 (Two) Times a Day., Disp: 60 tablet, Rfl: 1    No Known Allergies    The following portions of the patient's history were reviewed and updated as appropriate: allergies, current medications, past family history, past medical history, past social history, past surgical history and problem list.    Objective     Physical Exam  Vitals reviewed.   Constitutional:       General: She is not in acute distress.     Appearance: Normal appearance. She is well-developed. She is not ill-appearing or diaphoretic.   HENT:      Head: Normocephalic and atraumatic.      Right Ear: External ear normal.      Left Ear: External ear normal.      Nose: Nose normal.      Mouth/Throat:      Comments: Wearing a mask  Eyes:      General: No scleral icterus.        Right eye: No discharge.         Left eye: No discharge.      Comments: Bilateral mildly injected conjunctiva   Neck:      Vascular: No JVD.   Cardiovascular:      Rate and Rhythm: Normal rate and regular rhythm.      Heart sounds: Normal heart sounds. No murmur heard.   No friction rub. No gallop.    Pulmonary:      Effort: Pulmonary effort is normal. No respiratory distress.      Breath sounds: Normal breath sounds. No wheezing or rales.   Chest:      Chest wall: No tenderness.   Abdominal:      General: Bowel sounds are normal. There is no distension.      Palpations: Abdomen is soft. There is no mass.      Tenderness: There is no abdominal tenderness. There is no guarding.   Musculoskeletal:         General: No deformity. Normal range of motion.      Cervical back: Normal range of motion.   Skin:     General: Skin is warm and dry.      Findings: No erythema or rash.   Neurological:       "Mental Status: She is alert and oriented to person, place, and time.      Coordination: Coordination normal.   Psychiatric:         Mood and Affect: Mood normal.         Behavior: Behavior normal.         Thought Content: Thought content normal.         Judgment: Judgment normal.       Vitals:    08/26/21 1048   BP: 139/95   Pulse: 99   Temp: 98.6 °F (37 °C)   TempSrc: Infrared   Weight: 81.6 kg (180 lb)   Height: 167.6 cm (66\")       Results Review:   I reviewed the patient's new clinical results.    Admission on 06/25/2021, Discharged on 06/25/2021   Component Date Value Ref Range Status   • Glucose 06/25/2021 111* 65 - 99 mg/dL Final   • BUN 06/25/2021 8  6 - 20 mg/dL Final   • Creatinine 06/25/2021 0.76  0.57 - 1.00 mg/dL Final   • Sodium 06/25/2021 137  136 - 145 mmol/L Final   • Potassium 06/25/2021 3.5  3.5 - 5.2 mmol/L Final   • Chloride 06/25/2021 96* 98 - 107 mmol/L Final   • CO2 06/25/2021 25.1  22.0 - 29.0 mmol/L Final   • Calcium 06/25/2021 9.4  8.6 - 10.5 mg/dL Final   • Total Protein 06/25/2021 7.9  6.0 - 8.5 g/dL Final   • Albumin 06/25/2021 4.40  3.50 - 5.20 g/dL Final   • ALT (SGPT) 06/25/2021 10  1 - 33 U/L Final   • AST (SGOT) 06/25/2021 15  1 - 32 U/L Final   • Alkaline Phosphatase 06/25/2021 72  39 - 117 U/L Final   • Total Bilirubin 06/25/2021 0.3  0.0 - 1.2 mg/dL Final   • eGFR Non  Amer 06/25/2021 88  >60 mL/min/1.73 Final   • Globulin 06/25/2021 3.5  gm/dL Final   • A/G Ratio 06/25/2021 1.3  g/dL Final   • BUN/Creatinine Ratio 06/25/2021 10.5  7.0 - 25.0 Final   • Anion Gap 06/25/2021 15.9* 5.0 - 15.0 mmol/L Final   • Lipase 06/25/2021 13  13 - 60 U/L Final   • WBC 06/25/2021 15.61* 3.40 - 10.80 10*3/mm3 Final   • RBC 06/25/2021 4.84  3.77 - 5.28 10*6/mm3 Final   • Hemoglobin 06/25/2021 14.8  12.0 - 15.9 g/dL Final   • Hematocrit 06/25/2021 43.9  34.0 - 46.6 % Final   • MCV 06/25/2021 90.7  79.0 - 97.0 fL Final   • MCH 06/25/2021 30.6  26.6 - 33.0 pg Final   • MCHC 06/25/2021 33.7  " 31.5 - 35.7 g/dL Final   • RDW 06/25/2021 13.8  12.3 - 15.4 % Final   • RDW-SD 06/25/2021 46.1  37.0 - 54.0 fl Final   • MPV 06/25/2021 10.0  6.0 - 12.0 fL Final   • Platelets 06/25/2021 313  140 - 450 10*3/mm3 Final   • Neutrophil % 06/25/2021 86.1* 42.7 - 76.0 % Final   • Lymphocyte % 06/25/2021 8.7* 19.6 - 45.3 % Final   • Monocyte % 06/25/2021 4.4* 5.0 - 12.0 % Final   • Eosinophil % 06/25/2021 0.0* 0.3 - 6.2 % Final   • Basophil % 06/25/2021 0.4  0.0 - 1.5 % Final   • Immature Grans % 06/25/2021 0.4  0.0 - 0.5 % Final   • Neutrophils, Absolute 06/25/2021 13.43* 1.70 - 7.00 10*3/mm3 Final   • Lymphocytes, Absolute 06/25/2021 1.36  0.70 - 3.10 10*3/mm3 Final   • Monocytes, Absolute 06/25/2021 0.68  0.10 - 0.90 10*3/mm3 Final   • Eosinophils, Absolute 06/25/2021 0.00  0.00 - 0.40 10*3/mm3 Final   • Basophils, Absolute 06/25/2021 0.07  0.00 - 0.20 10*3/mm3 Final   • Immature Grans, Absolute 06/25/2021 0.07* 0.00 - 0.05 10*3/mm3 Final   • nRBC 06/25/2021 0.0  0.0 - 0.2 /100 WBC Final      CT Abdomen Pelvis Without Contrast  Result Date: 6/25/2021  Unremarkable.      EGD was completed by Dr. Worthington on 7/14/2021:  - The oropharynx was normal.  - The Z-line was irregular and was found 38 cm from the incisors.  - LA Grade D (one or more mucosal breaks involving at least 75% of esophageal circumference) esophagitis with no  bleeding was found in the lower third of the esophagus.  - Bilious fluid was found in the stomach.  - Patchy mildly erythematous mucosa without bleeding was found in the gastric fundus, in the gastric antrum and in the  prepyloric region of the stomach. Biopsies were taken with a cold forceps for Helicobacter pylori testing.  - The duodenal bulb, first portion of the duodenum, second portion of the duodenum and third portion of the duodenum  were normal. Biopsies for histology were taken with a cold forceps for evaluation of celiac disease.  Pathology showed nonspecific mild chronic duodenitis,  gastric mucosa with reactive changes, no H. pylori, negative for dysplasia or malignancy.    Assessment / Plan      1. Gastroesophageal reflux disease with esophagitis without hemorrhage  8/26/2021  I discussed her recent EGD and pathology results in detail with her today.  She had LA grade D esophagitis and bilious fluid in the stomach.  There was mildly erythematous mucosa in the gastric fundus, antrum and prepyloric region.  Pathology was benign.  No H. pylori.  She has been taking Protonix 40 mg twice daily since EGD completed on 7/14/2021.  It is recommended that she continue this double dose PPI for at least 12 weeks for treatment of severe esophagitis.  Refills given today.  After this 12-week course, she will decrease to 40 mg once daily.  Reglan therapy was intended to be a short therapy has been completed.  Antireflux measures given.  GERD diet given.  She will need repeat EGD for monitoring of resolution of severe esophagitis in 6 months, due 01/2022.    6/10/2021  She has been having severe reflux symptoms for the past 3 months. She did not have any GERD symptoms prior to her first severe episode in March 2021. No dysphagia. Has been taking Pepcid 40 mg once nightly without relief plus carafate daily. I have asked her to stop carafate for now. Start Protonix 40 mg once daily 30 minutes before a meal. Can continue Pepcid 40 mg at night if needed for relief of GERD. She was instructed not to lie down immediately after eating (wait at least 3 hours after meals), elevate the head of the bed at night, avoid spicy foods, avoid mints, avoid caffeine, avoid nicotine and work on getting to a healthy weight.     2. Intractable vomiting with nausea, unspecified vomiting type  8/26/2021  Nausea and vomiting and gradually improved since increasing dose of her PPI.  Symptoms likely related to her severe esophagitis and possible gastroparesis.  Low residue and small portion meals recommended. She should continue to cut  back on smoking and discontinue all marijuana use.    6/10/2021  She has had nausea and vomiting for the past 3 months. It is worse in the mornings and sometimes only contains liquid material or foam. Vomiting is not in association with a meal. She does admit smoking marijuana for years. I have asked her to stop marijuana use because this could be contributing. Had US gb completed recently and was told this was normal. She was seen at [facility] ED x2 in the past 3 months and has had several labs and imaging there. We will request those results for review. EGD will be completed for evaluation.           Follow Up:   Return in about 4 months (around 12/26/2021) for recheck GERD.      Roxanna Wallace PA-C  [facility]  8/26/2021  16:49 EDT    Please note that portions of this note may have been completed with a voice recognition program. Efforts were made to edit the dictations, but occasionally words are mistranscribed.

## 2021-09-09 ENCOUNTER — TELEPHONE (OUTPATIENT)
Dept: GASTROENTEROLOGY | Facility: CLINIC | Age: 31
End: 2021-09-09

## 2021-09-10 ENCOUNTER — TELEPHONE (OUTPATIENT)
Dept: GASTROENTEROLOGY | Facility: CLINIC | Age: 31
End: 2021-09-10

## 2021-09-10 DIAGNOSIS — K21.9 GASTROESOPHAGEAL REFLUX DISEASE, UNSPECIFIED WHETHER ESOPHAGITIS PRESENT: ICD-10-CM

## 2021-09-10 RX ORDER — PANTOPRAZOLE SODIUM 40 MG/1
40 TABLET, DELAYED RELEASE ORAL 2 TIMES DAILY
Qty: 60 TABLET | Refills: 1 | Status: SHIPPED | OUTPATIENT
Start: 2021-09-10 | End: 2021-11-29 | Stop reason: SDUPTHER

## 2021-09-10 NOTE — TELEPHONE ENCOUNTER
Patient called and states that her pharmacy filled only a 30 day supply of Protonix.  She would like the BID dose for #60 sent to [facility] pharmacy in [place].

## 2021-09-23 DIAGNOSIS — K21.9 GASTROESOPHAGEAL REFLUX DISEASE, UNSPECIFIED WHETHER ESOPHAGITIS PRESENT: ICD-10-CM

## 2021-09-24 RX ORDER — PANTOPRAZOLE SODIUM 40 MG/1
TABLET, DELAYED RELEASE ORAL
Qty: 30 TABLET | OUTPATIENT
Start: 2021-09-24

## 2021-09-27 ENCOUNTER — PRIOR AUTHORIZATION (OUTPATIENT)
Dept: GASTROENTEROLOGY | Facility: CLINIC | Age: 31
End: 2021-09-27

## 2021-09-27 NOTE — TELEPHONE ENCOUNTER
PA done for Pantoprazole 40 mg BID.  Approved through [employer].  Pharmacy aware (spoke with Torri at pharmacy).

## 2021-11-29 ENCOUNTER — TELEPHONE (OUTPATIENT)
Dept: GASTROENTEROLOGY | Facility: CLINIC | Age: 31
End: 2021-11-29

## 2021-11-29 DIAGNOSIS — K21.9 GASTROESOPHAGEAL REFLUX DISEASE, UNSPECIFIED WHETHER ESOPHAGITIS PRESENT: ICD-10-CM

## 2021-11-29 RX ORDER — PANTOPRAZOLE SODIUM 40 MG/1
40 TABLET, DELAYED RELEASE ORAL 2 TIMES DAILY
Qty: 60 TABLET | Refills: 1 | Status: SHIPPED | OUTPATIENT
Start: 2021-11-29 | End: 2022-02-02 | Stop reason: SDUPTHER

## 2022-02-02 ENCOUNTER — OFFICE VISIT (OUTPATIENT)
Dept: GASTROENTEROLOGY | Facility: CLINIC | Age: 32
End: 2022-02-02

## 2022-02-02 VITALS
TEMPERATURE: 98.2 F | BODY MASS INDEX: 27.32 KG/M2 | DIASTOLIC BLOOD PRESSURE: 83 MMHG | SYSTOLIC BLOOD PRESSURE: 130 MMHG | HEIGHT: 65 IN | HEART RATE: 93 BPM | WEIGHT: 164 LBS

## 2022-02-02 DIAGNOSIS — R63.4 WEIGHT LOSS, ABNORMAL: ICD-10-CM

## 2022-02-02 DIAGNOSIS — K21.00 GASTROESOPHAGEAL REFLUX DISEASE WITH ESOPHAGITIS WITHOUT HEMORRHAGE: Primary | ICD-10-CM

## 2022-02-02 DIAGNOSIS — R11.2 INTRACTABLE VOMITING WITH NAUSEA, UNSPECIFIED VOMITING TYPE: ICD-10-CM

## 2022-02-02 PROCEDURE — 99214 OFFICE O/P EST MOD 30 MIN: CPT | Performed by: PHYSICIAN ASSISTANT

## 2022-02-02 RX ORDER — PANTOPRAZOLE SODIUM 40 MG/1
40 TABLET, DELAYED RELEASE ORAL 2 TIMES DAILY
Qty: 60 TABLET | Refills: 2 | Status: SHIPPED | OUTPATIENT
Start: 2022-02-02 | End: 2022-05-05 | Stop reason: SDUPTHER

## 2022-02-02 RX ORDER — ONDANSETRON HYDROCHLORIDE 8 MG/1
8 TABLET, FILM COATED ORAL EVERY 12 HOURS PRN
Qty: 60 TABLET | Refills: 2 | Status: SHIPPED | OUTPATIENT
Start: 2022-02-02 | End: 2022-06-02 | Stop reason: SDUPTHER

## 2022-02-02 RX ORDER — SODIUM CHLORIDE 9 MG/ML
30 INJECTION, SOLUTION INTRAVENOUS CONTINUOUS PRN
Status: CANCELLED | OUTPATIENT
Start: 2022-02-02

## 2022-02-02 RX ORDER — ONDANSETRON HYDROCHLORIDE 8 MG/1
8 TABLET, FILM COATED ORAL DAILY
COMMUNITY
Start: 2022-01-31 | End: 2022-02-02 | Stop reason: SDUPTHER

## 2022-02-02 RX ORDER — IBUPROFEN 600 MG/1
TABLET ORAL AS NEEDED
COMMUNITY
Start: 2022-01-27 | End: 2022-02-02

## 2022-02-02 NOTE — PATIENT INSTRUCTIONS

## 2022-02-02 NOTE — PROGRESS NOTES
Follow Up Note     Date: 2022   Patient Name: Suzette Levy  MRN: [id number]  : 1990     Primary Care Provider: hCrissy Senior DO     Chief Complaint   Patient presents with   • Heartburn   • Nausea   • Vomiting     History of present illness:   2022  Suzette Levy is a 31 y.o. female who is here today for follow up regarding Heartburn, Nausea, and Vomiting.    Complains of severe nausea and vomiting only around the time of her menstrual cycle. Otherwise, she has been doing well taking protonix 40 mg BID plus zofran as needed for relief of reflux and vomiting. Adenomyosis was found on recent transvaginal ultrasound, has follow up with gynecology soon. She is still smoking cigarettes but has cigarettes but states she is still not smoking marijuana. She has lost nearly 20 lbs since last visit.     Interval History:  2021  Since her last visit approximately 2 months ago, she has noticed gradual improvements, taking PPI BID as prescribed, finished the reglan course.  She felt better while taking Reglan and felt that her food digested quicker while taking it which lessened her abdominal discomfort and nausea.  She is still taking Zofran intermittently with nausea severe.  She is no longer having vomiting daily.  She is only having severe symptoms of vomiting at the time of her menstrual cycle.  She has not seen a gynecologist recently.  She has cut back on smoking dramatically since last visit.  She has 1 cup of coffee in the mornings, no other caffeine.  She has been more careful with her diet.  She would like to discuss recent EGD results.     6/10/2021  At the end of 2021, she started having severe chest burning, indigestion, heartburn all worse with eating. She noticed a very temporary improvement then all symptoms came back. She has soreness in her upper abdomen. Has had nausea in the mornings with intermittent vomiting, sometimes with foam only in vomitus. She feels that her  symptoms are gradually getting worse again like they were when first present. Has aching in her epigastric region, feels like a hunger pain even when she is not hungry. These symptoms all seemed to come on suddenly, never had acid reflux in the past. No dysphagia. She ran out of protonix approx 2 weeks ago, currently taking pepcid 40 mg once nightly plus carafate tablet daily. She was previously was taking protonix 40 mg in the mornings and that seemed to help. She was seen at [facility] ED for this problem x2 and had imaging and labs completed, was told everything was normal. Had US gb and told normal.      No blood in vomitus. No black stools. Her bowel movements have been some constipated lately, relates to taking the zofran. Typically, she has normal daily stools. Smokes cigarettes and marijuana daily for approx 10-15 years. No alcohol use.     Subjective     Past Medical History:   Diagnosis Date   • Acid reflux    • Anemia    • Constipation    • Diarrhea    • Nausea & vomiting    • Peptic ulcer    • Rubella non-immune status, antepartum 07/11/2018   • Stomach pain      Past Surgical History:   Procedure Laterality Date   • ENDOSCOPY N/A 7/14/2021    Procedure: ESOPHAGOGASTRODUODENOSCOPY WITH BIOPSY CPT CODE: 85006;  Surgeon: Good Worthington MD;  Location: [facility] ENDOSCOPY;  Service: Gastroenterology;  Laterality: N/A;   • MANDIBLE SURGERY     • TUBAL COAGULATION LAPAROSCOPIC Bilateral 10/6/2018    Procedure: POSTPARTUM TUBAL LIGATION;  Surgeon: Tiffany Corona MD;  Location: [facility] OR;  Service: Obstetrics/Gynecology     Family History   Problem Relation Age of Onset   • Hypertension Father    • Coronary artery disease Father    • Hypertension Mother    • Diabetes Mother    • Diabetes Paternal Grandfather    • Colon cancer Neg Hx      Social History     Socioeconomic History   • Marital status:    Tobacco Use   • Smoking status: Current Every Day Smoker     Packs/day: 0.25     Types: Cigarettes      Start date: 2002   • Smokeless tobacco: Never Used   Vaping Use   • Vaping Use: Never used   Substance and Sexual Activity   • Alcohol use: No   • Drug use: No   • Sexual activity: Defer     Current Outpatient Medications:   •  cetirizine (zyrTEC) 10 MG tablet, , Disp: , Rfl:   •  fluticasone (FLONASE) 50 MCG/ACT nasal spray, , Disp: , Rfl:   •  ibuprofen (ADVIL,MOTRIN) 600 MG tablet, As Needed., Disp: , Rfl:   •  ondansetron (ZOFRAN) 8 MG tablet, Take 8 mg by mouth Daily., Disp: , Rfl:   •  ondansetron ODT (ZOFRAN-ODT) 4 MG disintegrating tablet, Place 4 mg on the tongue Every 8 (Eight) Hours As Needed for Nausea or Vomiting., Disp: , Rfl:   •  pantoprazole (PROTONIX) 40 MG EC tablet, Take 1 tablet by mouth 2 (Two) Times a Day., Disp: 60 tablet, Rfl: 1    No Known Allergies    The following portions of the patient's history were reviewed and updated as appropriate: allergies, current medications, past family history, past medical history, past social history, past surgical history and problem list.    Objective     Physical Exam  Constitutional:       General: She is not in acute distress.     Appearance: Normal appearance. She is well-developed. She is not diaphoretic.   HENT:      Head: Normocephalic and atraumatic.      Right Ear: External ear normal.      Left Ear: External ear normal.      Nose: Nose normal.      Mouth/Throat:      Comments: Wearing a mask  Eyes:      General: No scleral icterus.        Right eye: No discharge.         Left eye: No discharge.      Conjunctiva/sclera: Conjunctivae normal.   Neck:      Trachea: No tracheal deviation.   Pulmonary:      Effort: Pulmonary effort is normal. No respiratory distress.   Musculoskeletal:         General: Normal range of motion.      Cervical back: Normal range of motion.   Skin:     Coloration: Skin is not pale.      Findings: No erythema or rash.   Neurological:      Mental Status: She is alert and oriented to person, place, and time.       "Coordination: Coordination normal.   Psychiatric:         Mood and Affect: Mood normal.         Behavior: Behavior normal.         Thought Content: Thought content normal.         Judgment: Judgment normal.       Vitals:    02/02/22 1324   BP: 130/83   Pulse: 93   Temp: 98.2 °F (36.8 °C)   TempSrc: Infrared   Weight: 74.4 kg (164 lb)   Height: 165.1 cm (65\")     Results Review:   I reviewed the patient's new clinical results.    No recent lab results to review.    CT Abdomen Pelvis Without Contrast  Result Date: 6/25/2021  Unremarkable.      EGD was completed by Dr. Worthington on 7/14/2021:  - The oropharynx was normal.  - The Z-line was irregular and was found 38 cm from the incisors.  - LA Grade D (one or more mucosal breaks involving at least 75% of esophageal circumference) esophagitis with no  bleeding was found in the lower third of the esophagus.  - Bilious fluid was found in the stomach.  - Patchy mildly erythematous mucosa without bleeding was found in the gastric fundus, in the gastric antrum and in the  prepyloric region of the stomach. Biopsies were taken with a cold forceps for Helicobacter pylori testing.  - The duodenal bulb, first portion of the duodenum, second portion of the duodenum and third portion of the duodenum  were normal. Biopsies for histology were taken with a cold forceps for evaluation of celiac disease.  Pathology showed nonspecific mild chronic duodenitis, gastric mucosa with reactive changes, no H. pylori, negative for dysplasia or malignancy.     Assessment / Plan      1. Gastroesophageal reflux disease with esophagitis without hemorrhage  EGD 7/2021 showed LA grade D esophagitis and bilious fluid in the stomach.  There was mildly erythematous mucosa in the gastric fundus, antrum and prepyloric region.  Pathology was benign.  No H. pylori.  She has been taking Protonix 40 mg twice daily since EGD completed on 7/14/2021.  It was recommended that she continue this double dose PPI for " at least 12 weeks for treatment of severe esophagitis but she is very hesitant to decrease to once daily due to continued symptoms. Refills given today. Reglan therapy was intended to be a short therapy has been completed, did not notice much change while taking this.      Her first severe episode of vomiting was in March 2021. No dysphagia. Had been taking Pepcid 40 mg once nightly without relief plus carafate daily.    Continue PPI BID for GERD  Antireflux measures given.    GERD diet given.    She will need repeat EGD for monitoring of resolution of severe esophagitis in 6 months, due 01/2022.    - pantoprazole (PROTONIX) 40 MG EC tablet; Take 1 tablet by mouth 2 (Two) Times a Day.  Dispense: 60 tablet; Refill: 2    She will need an esophagogastroduodenoscopy performed with monitored anesthesia care. The indications, technique, alternatives and potential risk and complications were discussed with the patient including but not limited to bleeding, intestinal perforations, missing lesions and anesthetic complications. The patient understands and wishes to proceed with the procedure and has given their verbal consent. Written patient education information was given to the patient. She should follow up in the office after this procedure to discuss the results and further recommendations can be made at that time. The patient will call if they have further questions before procedure.  - Case Request    2. Intractable vomiting with nausea, unspecified vomiting type  3. Weight loss, abnormal  She takes PPI BID plus Zofran as needed for relief of nausea and has good relief from symptoms most days as long as she consumes small portions. Still complains of severe nausea and vomiting only around the time of her menstrual cycle. She has follow up with gynecology soon to further discuss her menstrual complaints. She has lost nearly 20 lbs since last visit which she relates to her current symptoms. Had previous normal US gb.  CTAP without contrast 6/2021 unremarkable. Labs 6/2021 unremarkable except increased WBC. Lipase normal.     Avoid smoking  Continue to avoid marijuana  Continue to consume small portions at meal times, do not overeat  Continue zofran PRN nausea and vomiting, has severe drowsiness with phenergan  Continue Protonix 40 mg BID for GERD  Repeat EGD as directed to re-check previously severe esophagitis  Labs today including TSH and celiac testing    - ondansetron (ZOFRAN) 8 MG tablet; Take 1 tablet by mouth Every 12 (Twelve) Hours As Needed for Nausea or Vomiting.  Dispense: 60 tablet; Refill: 2            Follow Up:   Return for follow up after procedure to discuss results.      Roxanna Wallace PA-C  [facility]  2/2/2022  13:49 EST    Please note that portions of this note may have been completed with a voice recognition program. Efforts were made to edit the dictations, but occasionally words are mistranscribed.

## 2022-03-08 ENCOUNTER — LAB (OUTPATIENT)
Dept: LAB | Facility: HOSPITAL | Age: 32
End: 2022-03-08

## 2022-03-08 DIAGNOSIS — R63.4 WEIGHT LOSS, ABNORMAL: ICD-10-CM

## 2022-03-08 DIAGNOSIS — Z01.812 PRE-PROCEDURE LAB EXAM: ICD-10-CM

## 2022-03-08 DIAGNOSIS — R19.7 DIARRHEA, UNSPECIFIED TYPE: ICD-10-CM

## 2022-03-08 DIAGNOSIS — R11.2 INTRACTABLE VOMITING WITH NAUSEA, UNSPECIFIED VOMITING TYPE: ICD-10-CM

## 2022-03-08 PROCEDURE — 84443 ASSAY THYROID STIM HORMONE: CPT

## 2022-03-08 PROCEDURE — 86364 TISS TRNSGLTMNASE EA IG CLAS: CPT

## 2022-03-08 PROCEDURE — 82784 ASSAY IGA/IGD/IGG/IGM EACH: CPT

## 2022-03-08 PROCEDURE — U0004 COV-19 TEST NON-CDC HGH THRU: HCPCS

## 2022-03-08 PROCEDURE — 36415 COLL VENOUS BLD VENIPUNCTURE: CPT

## 2022-03-08 PROCEDURE — C9803 HOPD COVID-19 SPEC COLLECT: HCPCS

## 2022-03-08 NOTE — PRE-PROCEDURE INSTRUCTIONS
PAT phone history completed with pt for upcoming procedure on 3/10/22, with Dr. Worthington.     PAT PASS GIVEN/REVIEWED WITH PT.  VERBALIZED UNDERSTANDING OF THE FOLLOWING:  DO NOT EAT, DRINK, SMOKE, USE SMOKELESS TOBACCO OR CHEW GUM AFTER MIDNIGHT THE NIGHT BEFORE SURGERY.  THIS ALSO INCLUDES HARD CANDIES AND MINTS.    DO NOT SHAVE THE AREA TO BE OPERATED ON AT LEAST 48 HOURS PRIOR TO THE PROCEDURE.  DO NOT WEAR MAKE UP OR NAIL POLISH.  DO NOT LEAVE IN ANY PIERCING OR WEAR JEWELRY THE DAY OF SURGERY.      DO NOT USE ADHESIVES IF YOU WEAR DENTURES.    DO NOT WEAR EYE CONTACTS; BRING IN YOUR GLASSES.    ONLY TAKE MEDICATION THE MORNING OF YOUR PROCEDURE IF INSTRUCTED BY YOUR SURGEON WITH ENOUGH WATER TO SWALLOW THE MEDICATION.  IF YOUR SURGEON DID NOT SPECIFY WHICH MEDICATIONS TO TAKE, YOU WILL NEED TO CALL THEIR OFFICE FOR FURTHER INSTRUCTIONS AND DO AS THEY INSTRUCT.    LEAVE ANYTHING YOU CONSIDER VALUABLE AT HOME.    YOU WILL NEED TO ARRANGE FOR SOMEONE TO DRIVE YOU HOME AFTER SURGERY.  IT IS RECOMMENDED THAT YOU DO NOT DRIVE, WORK, DRINK ALCOHOL OR MAKE MAJOR DECISIONS FOR AT LEAST 24 HOURS AFTER YOUR PROCEDURE IS COMPLETE.      THE DAY OF YOUR PROCEDURE, BRING IN THE FOLLOWING IF APPLICABLE:   PICTURE ID AND INSURANCE/MEDICARE OR MEDICAID CARDS/ANY CO-PAY THAT MAY BE DUE   COPY OF ADVANCED DIRECTIVE/LIVING WILL/POWER OR    CPAP/BIPAP/INHALERS   SKIN PREP SHEET   YOUR PREADMISSION TESTING PASS (IF NOT A PHONE HISTORY)           COVID self-quarantine instructions reviewed with the pt.  Verbalized understanding.

## 2022-03-09 LAB
IGA1 MFR SER: 408 MG/DL (ref 70–400)
SARS-COV-2 RNA NOSE QL NAA+PROBE: NOT DETECTED
TSH SERPL DL<=0.05 MIU/L-ACNC: 1.26 UIU/ML (ref 0.27–4.2)

## 2022-03-10 ENCOUNTER — HOSPITAL ENCOUNTER (OUTPATIENT)
Facility: HOSPITAL | Age: 32
Setting detail: HOSPITAL OUTPATIENT SURGERY
Discharge: HOME | End: 2022-03-10
Attending: INTERNAL MEDICINE | Admitting: INTERNAL MEDICINE

## 2022-03-10 ENCOUNTER — ANESTHESIA (OUTPATIENT)
Dept: GASTROENTEROLOGY | Facility: HOSPITAL | Age: 32
End: 2022-03-10

## 2022-03-10 ENCOUNTER — ANESTHESIA EVENT (OUTPATIENT)
Dept: GASTROENTEROLOGY | Facility: HOSPITAL | Age: 32
End: 2022-03-10

## 2022-03-10 VITALS
OXYGEN SATURATION: 98 % | HEIGHT: 66 IN | HEART RATE: 73 BPM | SYSTOLIC BLOOD PRESSURE: 142 MMHG | WEIGHT: 160 LBS | BODY MASS INDEX: 25.71 KG/M2 | DIASTOLIC BLOOD PRESSURE: 95 MMHG | TEMPERATURE: 98.3 F | RESPIRATION RATE: 18 BRPM

## 2022-03-10 DIAGNOSIS — K21.00 GASTROESOPHAGEAL REFLUX DISEASE WITH ESOPHAGITIS WITHOUT HEMORRHAGE: ICD-10-CM

## 2022-03-10 LAB
B-HCG UR QL: NEGATIVE
EXPIRATION DATE: NORMAL
INTERNAL NEGATIVE CONTROL: NORMAL
INTERNAL POSITIVE CONTROL: NORMAL
Lab: NORMAL
TTG IGA SER-ACNC: <2 U/ML (ref 0–3)

## 2022-03-10 PROCEDURE — 81025 URINE PREGNANCY TEST: CPT | Performed by: INTERNAL MEDICINE

## 2022-03-10 PROCEDURE — 25010000002 PROPOFOL 10 MG/ML EMULSION: Performed by: NURSE ANESTHETIST, CERTIFIED REGISTERED

## 2022-03-10 PROCEDURE — 43239 EGD BIOPSY SINGLE/MULTIPLE: CPT | Performed by: INTERNAL MEDICINE

## 2022-03-10 RX ORDER — SODIUM CHLORIDE 9 MG/ML
30 INJECTION, SOLUTION INTRAVENOUS CONTINUOUS PRN
Status: DISCONTINUED | OUTPATIENT
Start: 2022-03-10 | End: 2022-03-10 | Stop reason: HOSPADM

## 2022-03-10 RX ORDER — ONDANSETRON 2 MG/ML
4 INJECTION INTRAMUSCULAR; INTRAVENOUS ONCE AS NEEDED
Status: CANCELLED | OUTPATIENT
Start: 2022-03-10 | End: 2022-03-10

## 2022-03-10 RX ORDER — PROPOFOL 10 MG/ML
VIAL (ML) INTRAVENOUS AS NEEDED
Status: DISCONTINUED | OUTPATIENT
Start: 2022-03-10 | End: 2022-03-10 | Stop reason: SURG

## 2022-03-10 RX ADMIN — PROPOFOL 330 MG: 10 INJECTION, EMULSION INTRAVENOUS at 08:16

## 2022-03-10 RX ADMIN — SODIUM CHLORIDE 30 ML/HR: 9 INJECTION, SOLUTION INTRAVENOUS at 07:10

## 2022-03-10 NOTE — DISCHARGE INSTRUCTIONS
- Discharge patient to home (ambulatory).   - Resume previous diet.   - Follow an antireflux regimen.   - Reduce ppi protonix to 40mg po daily  - Avoid smoking  - Await pathology results.   - Return to my office in 8 weeks.     To assist you in voiding:  Drink plenty of fluids  Listen to running water while attempting to void.    If you are unable to urinate and you have an uncomfortable urge to void or it has been   6 hours since you were discharged, return to the Emergency Room     No pushing, pulling, tugging,  heavy lifting, or strenuous activity.  No major decision making, driving, or drinking alcoholic beverages for 24 hours. ( due to the medications you have  received)  Always use good hand hygiene/washing techniques.  NO driving while taking pain medications.

## 2022-03-10 NOTE — ANESTHESIA POSTPROCEDURE EVALUATION
Patient: Suzette Levy    Procedure Summary     Date: 03/10/22 Room / Location: [facility] ENDOSCOPY 2 / [facility] ENDOSCOPY    Anesthesia Start: 0803 Anesthesia Stop: 0819    Procedure: ESOPHAGOGASTRODUODENOSCOPY WITH BIOPSY  (N/A ) Diagnosis:       Gastroesophageal reflux disease with esophagitis without hemorrhage      (Gastroesophageal reflux disease with esophagitis without hemorrhage [K21.00])    Surgeons: Good Worthington MD Provider: Scott Tate CRNA    Anesthesia Type: MAC ASA Status: 2          Anesthesia Type: MAC    Vitals  Vitals Value Taken Time   BP 93/60 03/10/22 0821   Temp 98.3 °F (36.8 °C) 03/10/22 0821   Pulse 79 03/10/22 0821   Resp 18 03/10/22 0821   SpO2 92 % 03/10/22 0821           Post Anesthesia Care and Evaluation    Patient location during evaluation: PHASE II  Patient participation: complete - patient participated  Level of consciousness: awake  Pain score: 1  Pain management: adequate  Airway patency: patent  Anesthetic complications: No anesthetic complications  PONV Status: controlled  Cardiovascular status: acceptable and stable  Respiratory status: acceptable  Hydration status: acceptable

## 2022-03-10 NOTE — H&P
[facility]  HISTORY AND PHYSICAL    Patient Name: Suzette Levy  : 1990  MRN: [id number]    Chief Complaint:   For EGD    History Of Presenting Illness:    Nausea vomiting  H/o severe esophagitis    Past Medical History:   Diagnosis Date   • Acid reflux    • Anemia    • Constipation    • Diarrhea    • Nausea & vomiting    • Peptic ulcer    • Rubella non-immune status, antepartum 2018   • Stomach pain        Past Surgical History:   Procedure Laterality Date   • ENDOSCOPY N/A 2021    Procedure: ESOPHAGOGASTRODUODENOSCOPY WITH BIOPSY CPT CODE: 58311;  Surgeon: Good Worthington MD;  Location: [facility] ENDOSCOPY;  Service: Gastroenterology;  Laterality: N/A;   • MANDIBLE SURGERY     • TUBAL COAGULATION LAPAROSCOPIC Bilateral 10/6/2018    Procedure: POSTPARTUM TUBAL LIGATION;  Surgeon: Tiffayn Corona MD;  Location: [facility] OR;  Service: Obstetrics/Gynecology       Social History     Socioeconomic History   • Marital status:    Tobacco Use   • Smoking status: Current Every Day Smoker     Packs/day: 0.25     Types: Cigarettes     Start date:    • Smokeless tobacco: Never Used   Vaping Use   • Vaping Use: Never used   Substance and Sexual Activity   • Alcohol use: No   • Drug use: No   • Sexual activity: Defer       Family History   Problem Relation Age of Onset   • Hypertension Father    • Coronary artery disease Father    • Hypertension Mother    • Diabetes Mother    • Diabetes Paternal Grandfather    • Colon cancer Neg Hx        Prior to Admission Medications:  Medications Prior to Admission   Medication Sig Dispense Refill Last Dose   • cetirizine (zyrTEC) 10 MG tablet Take 10 mg by mouth Daily.   3/9/2022 at 0800   • fluticasone (FLONASE) 50 MCG/ACT nasal spray 1 spray into the nostril(s) as directed by provider Daily.   3/9/2022 at 0800   • ondansetron (ZOFRAN) 8 MG tablet Take 1 tablet by mouth Every 12 (Twelve) Hours As Needed for Nausea or Vomiting. 60 tablet 2  3/9/2022 at 1900   • pantoprazole (PROTONIX) 40 MG EC tablet Take 1 tablet by mouth 2 (Two) Times a Day. 60 tablet 2 3/9/2022 at 1900       Allergies:  No Known Allergies     Vitals: Temp:  [98 °F (36.7 °C)] 98 °F (36.7 °C)  Heart Rate:  [90] 90  Resp:  [20] 20  BP: (123)/(87) 123/87    Review Of Systems:  Constitutional:  Negative for chills, fever, and unexpected weight change.  Respiratory:  Negative for cough, chest tightness, shortness of breath, and wheezing.  Cardiovascular:  Negative for chest pain, palpitations, and leg swelling.  Gastrointestinal:  Negative for abdominal distention, abdominal pain, Nausea, vomiting.  Neurological:  Negative for Weakness, numbness, and headaches.     Physical Exam:    General Appearance:  Alert, cooperative, in no acute distress.   Lungs:   Clear to auscultation, respirations regular, even and                 unlabored.   Heart:  Regular rhythm and normal rate.   Abdomen:   Normal bowel sounds, no masses, no organomegaly. Soft, non-tender, non-distended   Neurologic: Alert and oriented x 3. Moves all four limbs equally       Plan: ESOPHAGOGASTRODUODENOSCOPY WITH BIOPSY  (N/A)     Good Worthington MD  3/10/2022

## 2022-03-10 NOTE — ANESTHESIA PREPROCEDURE EVALUATION
Anesthesia Evaluation     Patient summary reviewed and Nursing notes reviewed   no history of anesthetic complications:  NPO Solid Status: > 8 hours  NPO Liquid Status: > 8 hours           Airway   Mallampati: II  TM distance: >3 FB  Neck ROM: full  no difficulty expected  Dental - normal exam     Pulmonary - negative pulmonary ROS and normal exam   Cardiovascular - negative cardio ROS and normal exam    Rhythm: regular  Rate: normal        Neuro/Psych- negative ROS  GI/Hepatic/Renal/Endo    (+)  GERD, PUD,      Musculoskeletal (-) negative ROS    Abdominal    Substance History - negative use     OB/GYN negative ob/gyn ROS         Other - negative ROS                       Anesthesia Plan    ASA 2     MAC   (Pt told that intravenous sedation will be used as the primary anesthetic along with local anesthesia if necessary. Every effort will be made to make sure the patient is comfortable.     The patient was told they may or may not have recall for the procedure. It was further explained that if the MAC was not adequate that a general anesthetic with either an LMA or endotracheal tube would be required.     Will proceed with the plan of care.)  intravenous induction     Anesthetic plan, all risks, benefits, and alternatives have been provided, discussed and informed consent has been obtained with: patient.        CODE STATUS:

## 2022-03-12 LAB
LAB AP CASE REPORT: NORMAL
PATH REPORT.FINAL DX SPEC: NORMAL

## 2022-05-05 ENCOUNTER — OFFICE VISIT (OUTPATIENT)
Dept: GASTROENTEROLOGY | Facility: CLINIC | Age: 32
End: 2022-05-05

## 2022-05-05 VITALS
DIASTOLIC BLOOD PRESSURE: 64 MMHG | HEIGHT: 66 IN | HEART RATE: 96 BPM | WEIGHT: 150 LBS | SYSTOLIC BLOOD PRESSURE: 131 MMHG | TEMPERATURE: 98.2 F | BODY MASS INDEX: 24.11 KG/M2

## 2022-05-05 DIAGNOSIS — R10.30 LOWER ABDOMINAL PAIN: ICD-10-CM

## 2022-05-05 DIAGNOSIS — R19.7 DIARRHEA, UNSPECIFIED TYPE: ICD-10-CM

## 2022-05-05 DIAGNOSIS — R63.4 WEIGHT LOSS, ABNORMAL: ICD-10-CM

## 2022-05-05 DIAGNOSIS — K21.00 GASTROESOPHAGEAL REFLUX DISEASE WITH ESOPHAGITIS WITHOUT HEMORRHAGE: Primary | ICD-10-CM

## 2022-05-05 PROCEDURE — 99214 OFFICE O/P EST MOD 30 MIN: CPT | Performed by: PHYSICIAN ASSISTANT

## 2022-05-05 RX ORDER — SODIUM CHLORIDE 9 MG/ML
30 INJECTION, SOLUTION INTRAVENOUS CONTINUOUS PRN
Status: CANCELLED | OUTPATIENT
Start: 2022-05-05

## 2022-05-05 RX ORDER — ETONOGESTREL AND ETHINYL ESTRADIOL 11.7; 2.7 MG/1; MG/1
1 INSERT, EXTENDED RELEASE VAGINAL
COMMUNITY
Start: 2022-02-17 | End: 2022-11-02

## 2022-05-05 RX ORDER — BISACODYL 5 MG
TABLET, DELAYED RELEASE (ENTERIC COATED) ORAL
Qty: 4 TABLET | Refills: 0 | Status: SHIPPED | OUTPATIENT
Start: 2022-05-05 | End: 2022-06-29 | Stop reason: HOSPADM

## 2022-05-05 RX ORDER — DICYCLOMINE HYDROCHLORIDE 10 MG/1
10 CAPSULE ORAL 3 TIMES DAILY PRN
Qty: 90 CAPSULE | Refills: 1 | Status: SHIPPED | OUTPATIENT
Start: 2022-05-05 | End: 2022-11-03

## 2022-05-05 RX ORDER — POLYETHYLENE GLYCOL 3350 17 G/17G
POWDER, FOR SOLUTION ORAL
Qty: 238 G | Refills: 0 | Status: SHIPPED | OUTPATIENT
Start: 2022-05-05 | End: 2022-06-29 | Stop reason: HOSPADM

## 2022-05-05 RX ORDER — PANTOPRAZOLE SODIUM 40 MG/1
40 TABLET, DELAYED RELEASE ORAL DAILY
Qty: 30 TABLET | Refills: 3 | Status: SHIPPED | OUTPATIENT
Start: 2022-05-05 | End: 2022-06-30 | Stop reason: SDUPTHER

## 2022-05-05 NOTE — PROGRESS NOTES
Follow Up Note     Date: 2022   Patient Name: Suzette Levy  MRN: [id number]  : 1990     Primary Care Provider: Chrissy Senior DO     Chief Complaint   Patient presents with   • Follow-up   • Heartburn   • Nausea   • Weight Loss   • Vomiting     History of present illness:   2022  Suzette Levy is a 32 y.o. female who is here today for follow up regarding Heartburn, Nausea, Weight Loss, and Vomiting.    She is feeling some better now but has developed lower abdominal pain, diarrhea and severe nausea if she is not able to rush to the restroom. She has lost 14 more lbs since last visit. Stools are typically diarrhea, loose to watery. Pain is located in bilateral lower quadrants. She recently changed birth control to nuva ring because she was not able to get the mirena (it was unable to be placed). Still taking protonix 40 mg BID for GERD, has not tried only once daily yet.     Interval History:  2022  Complains of severe nausea and vomiting only around the time of her menstrual cycle. Otherwise, she has been doing well taking protonix 40 mg BID plus zofran as needed for relief of reflux and vomiting. Adenomyosis was found on recent transvaginal ultrasound, has follow up with gynecology soon. She is still smoking cigarettes but states she is still not smoking marijuana. She has lost nearly 20 lbs since last visit.      6/10/2021  At the end of 2021, she started having severe chest burning, indigestion, heartburn all worse with eating. She noticed a very temporary improvement then all symptoms came back. She has soreness in her upper abdomen. Has had nausea in the mornings with intermittent vomiting, sometimes with foam only in vomitus. She feels that her symptoms are gradually getting worse again like they were when first present. Has aching in her epigastric region, feels like a hunger pain even when she is not hungry. These symptoms all seemed to come on suddenly, never had acid  reflux in the past. No dysphagia. She ran out of protonix approx 2 weeks ago, currently taking pepcid 40 mg once nightly plus carafate tablet daily. She was previously was taking protonix 40 mg in the mornings and that seemed to help. She was seen at [facility] ED for this problem x2 and had imaging and labs completed, was told everything was normal. Had US gb and told normal.      No blood in vomitus. No black stools. Her bowel movements have been some constipated lately, relates to taking the zofran. Typically, she has normal daily stools. Smokes cigarettes and marijuana daily for approx 10-15 years. No alcohol use.     Subjective     Past Medical History:   Diagnosis Date   • Acid reflux    • Anemia    • Constipation    • Diarrhea    • Nausea & vomiting    • Peptic ulcer    • Rubella non-immune status, antepartum 07/11/2018   • Stomach pain      Past Surgical History:   Procedure Laterality Date   • ENDOSCOPY N/A 7/14/2021    Procedure: ESOPHAGOGASTRODUODENOSCOPY WITH BIOPSY CPT CODE: 33447;  Surgeon: Good Worthington MD;  Location: [facility] ENDOSCOPY;  Service: Gastroenterology;  Laterality: N/A;   • ENDOSCOPY N/A 3/10/2022    Procedure: ESOPHAGOGASTRODUODENOSCOPY WITH BIOPSY ;  Surgeon: Good Worthington MD;  Location: [facility] ENDOSCOPY;  Service: Gastroenterology;  Laterality: N/A;   • MANDIBLE SURGERY     • TUBAL COAGULATION LAPAROSCOPIC Bilateral 10/6/2018    Procedure: POSTPARTUM TUBAL LIGATION;  Surgeon: Tiffany Corona MD;  Location: [facility] OR;  Service: Obstetrics/Gynecology     Family History   Problem Relation Age of Onset   • Hypertension Father    • Coronary artery disease Father    • Hypertension Mother    • Diabetes Mother    • Diabetes Paternal Grandfather    • Colon cancer Neg Hx      Social History     Socioeconomic History   • Marital status:    Tobacco Use   • Smoking status: Current Every Day Smoker     Packs/day: 0.25     Types: Cigarettes     Start date: 2002   • Smokeless  tobacco: Never Used   Vaping Use   • Vaping Use: Never used   Substance and Sexual Activity   • Alcohol use: No   • Drug use: No   • Sexual activity: Defer     Current Outpatient Medications:   •  cetirizine (zyrTEC) 10 MG tablet, Take 10 mg by mouth Daily., Disp: , Rfl:   •  etonogestrel-ethinyl estradiol (NUVARING) 0.12-0.015 MG/24HR vaginal ring, Insert 1 each into the vagina Every 21 (Twenty-One) Days., Disp: , Rfl:   •  fluticasone (FLONASE) 50 MCG/ACT nasal spray, 1 spray into the nostril(s) as directed by provider Daily., Disp: , Rfl:   •  ondansetron (ZOFRAN) 8 MG tablet, Take 1 tablet by mouth Every 12 (Twelve) Hours As Needed for Nausea or Vomiting., Disp: 60 tablet, Rfl: 2  •  pantoprazole (PROTONIX) 40 MG EC tablet, Take 1 tablet by mouth 2 (Two) Times a Day., Disp: 60 tablet, Rfl: 2    No Known Allergies    The following portions of the patient's history were reviewed and updated as appropriate: allergies, current medications, past family history, past medical history, past social history, past surgical history and problem list.    Objective     Physical Exam  Constitutional:       General: She is not in acute distress.     Appearance: Normal appearance. She is well-developed. She is not diaphoretic.   HENT:      Head: Normocephalic and atraumatic.      Right Ear: External ear normal.      Left Ear: External ear normal.      Nose: Nose normal.      Mouth/Throat:      Comments: Wearing a mask  Eyes:      General: No scleral icterus.        Right eye: No discharge.         Left eye: No discharge.      Conjunctiva/sclera: Conjunctivae normal.   Neck:      Trachea: No tracheal deviation.   Pulmonary:      Effort: Pulmonary effort is normal. No respiratory distress.   Abdominal:      General: Abdomen is flat. Bowel sounds are normal. There is no distension.      Palpations: Abdomen is soft. There is no mass.      Tenderness: There is abdominal tenderness (generalized, mild).      Hernia: No hernia is  "present.      Comments: Scaphoid abdomen   Musculoskeletal:         General: Normal range of motion.      Cervical back: Normal range of motion.   Skin:     Coloration: Skin is not pale.      Findings: No erythema or rash.      Comments: Healing lesions bilateral lower extremities   Neurological:      Mental Status: She is alert and oriented to person, place, and time.      Coordination: Coordination normal.   Psychiatric:         Behavior: Behavior normal.         Thought Content: Thought content normal.         Judgment: Judgment normal.      Comments: Mild anxious affect       Vitals:    05/05/22 1257   BP: 131/64   Pulse: 96   Temp: 98.2 °F (36.8 °C)   TempSrc: Infrared   Weight: 68 kg (150 lb)   Height: 167.6 cm (66\")       Results Review:   I reviewed the patient's new clinical results.    Component Date Value Ref Range Status   • TSH 03/08/2022 1.260  0.270 - 4.200 uIU/mL Final   • Tissue Transglutaminase IgA 03/08/2022 <2  0 - 3 U/mL Final                                  Negative        0 -  3                                Weak Positive   4 - 10                                Positive           >10   Tissue Transglutaminase (tTG) has been identified   as the endomysial antigen.  Studies have demonstr-   ated that endomysial IgA antibodies have over 99%   specificity for gluten sensitive enteropathy.   • IgA 03/08/2022 408 (A) 70 - 400 mg/dL Final   • SARS-CoV-2 ANSON 03/08/2022 Not Detected  Not Detected Final      CT Abdomen Pelvis Without Contrast  Result Date: 6/25/2021  Unremarkable.      EGD was completed by Dr. Worthington on 7/14/2021:  - The oropharynx was normal.  - The Z-line was irregular and was found 38 cm from the incisors.  - LA Grade D (one or more mucosal breaks involving at least 75% of esophageal circumference) esophagitis with no  bleeding was found in the lower third of the esophagus.  - Bilious fluid was found in the stomach.  - Patchy mildly erythematous mucosa without bleeding was found " in the gastric fundus, in the gastric antrum and in the  prepyloric region of the stomach. Biopsies were taken with a cold forceps for Helicobacter pylori testing.  - The duodenal bulb, first portion of the duodenum, second portion of the duodenum and third portion of the duodenum  were normal. Biopsies for histology were taken with a cold forceps for evaluation of celiac disease.  Pathology showed nonspecific mild chronic duodenitis, gastric mucosa with reactive changes, no H. pylori, negative for dysplasia or malignancy.      EGD was completed by Dr. Worthington on 3/10/2022:  - The oropharynx was normal.  - The Z-line was regular and was found 37 cm from the incisors.  - LA Grade A (one or more mucosal breaks less than 5 mm, not extending between tops of 2 mucosal folds) esophagitis  with no bleeding was found in the lower third of the esophagus. Biopsies were taken with a cold forceps for histology.  Her prior esophagitis almost healed. This minimal erythema distal esophagus could be be healed mucosa than esophagitis.  - A 1 cm hiatal hernia was present.  - Patchy mildly erythematous mucosa without bleeding was found in the gastric antrum and in the prepyloric region of the  stomach. Biopsies were taken with a cold forceps for Helicobacter pylori testing.  - The duodenal bulb, first portion of the duodenum, second portion of the duodenum and third portion of the duodenum  were normal.  Pathology showed reactive gastropathy, no H pylori, no intestinal metaplasia of the gastric biopsy, squamous mucosa with mid reactive changes and no increased intraepithelial eosinophils, intestinal metaplasia or dysplasia of the esophageal biopsy    Assessment / Plan      1. Gastroesophageal reflux disease with esophagitis without hemorrhage  EGD 7/2021 showed LA grade D esophagitis and bilious fluid in the stomach.  There was mildly erythematous mucosa in the gastric fundus, antrum and prepyloric region.  Pathology was benign.  No  H. pylori.  Repeat EGD 3/2022 showed improvement with LA grade esophagitis, 1 cm hiatal hernia, patchy erythma in the antrum and prepyloric region of the stomach, normal duodenum. Pathology benign.     She has been taking Protonix 40 mg twice daily since EGD completed on 7/14/2021. Still taking protonix 40 mg BID but will decrease to one daily for treatment of GERD. Reglan short therapy was previously given, did not notice much change while taking this.  She is feeling better now, not having frequent episodes as previous with nausea and vomiting.   Her first severe episode of vomiting was in March 2021. No dysphagia. Had been taking Pepcid 40 mg once nightly without relief plus carafate daily.     Continue PPI, change to daily  Acid reflux measures to continue   Can take zofran PRN nausea    2. Diarrhea, unspecified type  3. Lower abdominal pain  4. Weight loss, abnormal  Although vomiting has mostly resolved, she continues to lose weight, has lost 14 more lbs since Feb 2022. She has now lost approx 40 lbs since 1 year ago. She takes PPI BID plus Zofran now as needed for relief of nausea and has good relief from symptoms most days as long as she consumes small portions. Has changed her birth control recently to nuva ring, mirena was not able to be placed. Had previous normal US gb. CTAP without contrast 6/2021 unremarkable. Labs 6/2021 unremarkable except increased WBC. Lipase normal. Labs 3/2022 normal TSH and negative for celiac.     Now with complaints of lower abdominal pain, occurring daily, worse in the mornings and associated diarrhea with fecal urgency. No prior colonsocopy. Suspect IBS as etiology of her complaints but in setting of large amount weight loss, IBD to rule out.      Avoid smoking  Continue to avoid marijuana  Continue to consume small portions at meal times, do not overeat  Low FODMAP diet  Continue zofran PRN nausea and vomiting, has severe drowsiness with phenergan  Continue PPI  daily  Bentyl PRN abdominal pain  Will get CTAP due to persistent weight loss  Schedule an appt with GYN  Stool testing  Colonoscopy will be arranged    - Calprotectin, Fecal - Stool, Per Rectum; Future  - Gastrointestinal Panel, PCR - Stool, Per Rectum; Future  - Clostridium Difficile Toxin, PCR - Stool, Per Rectum; Future  - Pancreatic Elastase, Fecal - Stool, Per Rectum; Future    - CT Abdomen Pelvis With Contrast; Future    - dicyclomine (Bentyl) 10 MG capsule; Take 1 capsule by mouth 3 (Three) Times a Day As Needed (abdominal pain or diarrhea).  Dispense: 90 capsule; Refill: 1    She will have a colonoscopy performed with monitored anesthesia care. The indications, technique, alternatives and potential risk and complications were discussed with the patient including but not limited to bleeding, bowel perforations, missing lesions and anesthetic complications. The patient understands and wishes to proceed with the procedure and has given their verbal consent. Written patient education information was given to the patient. She should follow up in the office after this procedure to discuss the results and further recommendations can be made at that time. The patient will call if they have further questions before procedure.  - Case Request  - polyethylene glycol (MIRALAX) 17 GM/SCOOP powder; Follow directions given at office  Dispense: 238 g; Refill: 0  - bisacodyl (Dulcolax) 5 MG EC tablet; Follow instructions given at office  Dispense: 4 tablet; Refill: 0                  Follow Up:   Return for follow up after procedure to discuss results.      Roxanna Wallace PA-C  [facility]  5/5/2022  13:44 EDT    Please note that portions of this note may have been completed with a voice recognition program. Efforts were made to edit the dictations, but occasionally words are mistranscribed.

## 2022-05-19 ENCOUNTER — HOSPITAL ENCOUNTER (OUTPATIENT)
Dept: CT IMAGING | Facility: HOSPITAL | Age: 32
Discharge: HOME | End: 2022-05-19
Admitting: PHYSICIAN ASSISTANT

## 2022-05-19 DIAGNOSIS — R63.4 WEIGHT LOSS, ABNORMAL: ICD-10-CM

## 2022-05-19 DIAGNOSIS — R10.30 LOWER ABDOMINAL PAIN: ICD-10-CM

## 2022-05-19 DIAGNOSIS — R19.7 DIARRHEA, UNSPECIFIED TYPE: ICD-10-CM

## 2022-05-19 LAB
ADV 40+41 DNA STL QL NAA+NON-PROBE: NOT DETECTED
ASTRO TYP 1-8 RNA STL QL NAA+NON-PROBE: NOT DETECTED
C CAYETANENSIS DNA STL QL NAA+NON-PROBE: NOT DETECTED
C COLI+JEJ+UPSA DNA STL QL NAA+NON-PROBE: NOT DETECTED
C DIFF TOX GENS STL QL NAA+PROBE: NOT DETECTED
CRYPTOSP DNA STL QL NAA+NON-PROBE: NOT DETECTED
E HISTOLYT DNA STL QL NAA+NON-PROBE: NOT DETECTED
EAEC PAA PLAS AGGR+AATA ST NAA+NON-PRB: NOT DETECTED
EC STX1+STX2 GENES STL QL NAA+NON-PROBE: NOT DETECTED
EPEC EAE GENE STL QL NAA+NON-PROBE: NOT DETECTED
ETEC LTA+ST1A+ST1B TOX ST NAA+NON-PROBE: NOT DETECTED
G LAMBLIA DNA STL QL NAA+NON-PROBE: NOT DETECTED
NOROVIRUS GI+II RNA STL QL NAA+NON-PROBE: NOT DETECTED
P SHIGELLOIDES DNA STL QL NAA+NON-PROBE: NOT DETECTED
RVA RNA STL QL NAA+NON-PROBE: NOT DETECTED
S ENT+BONG DNA STL QL NAA+NON-PROBE: NOT DETECTED
SAPO I+II+IV+V RNA STL QL NAA+NON-PROBE: NOT DETECTED
SHIGELLA SP+EIEC IPAH ST NAA+NON-PROBE: NOT DETECTED
V CHOL+PARA+VUL DNA STL QL NAA+NON-PROBE: NOT DETECTED
V CHOLERAE DNA STL QL NAA+NON-PROBE: NOT DETECTED
Y ENTEROCOL DNA STL QL NAA+NON-PROBE: NOT DETECTED

## 2022-05-19 PROCEDURE — 87507 IADNA-DNA/RNA PROBE TQ 12-25: CPT

## 2022-05-19 PROCEDURE — 74177 CT ABD & PELVIS W/CONTRAST: CPT

## 2022-05-19 PROCEDURE — 87493 C DIFF AMPLIFIED PROBE: CPT

## 2022-05-19 PROCEDURE — 83993 ASSAY FOR CALPROTECTIN FECAL: CPT

## 2022-05-19 PROCEDURE — 25010000002 IOPAMIDOL 61 % SOLUTION: Performed by: PHYSICIAN ASSISTANT

## 2022-05-19 PROCEDURE — 82653 EL-1 FECAL QUANTITATIVE: CPT

## 2022-05-19 RX ADMIN — IOPAMIDOL 100 ML: 612 INJECTION, SOLUTION INTRAVENOUS at 13:00

## 2022-05-23 LAB — CALPROTECTIN STL-MCNT: 51 UG/G (ref 0–120)

## 2022-05-26 LAB — ELASTASE PANC STL-MCNT: 309 UG ELAST./G

## 2022-05-27 ENCOUNTER — TELEPHONE (OUTPATIENT)
Dept: GASTROENTEROLOGY | Facility: CLINIC | Age: 32
End: 2022-05-27

## 2022-05-27 ENCOUNTER — PRIOR AUTHORIZATION (OUTPATIENT)
Dept: GASTROENTEROLOGY | Facility: CLINIC | Age: 32
End: 2022-05-27

## 2022-05-27 DIAGNOSIS — K58.0 IRRITABLE BOWEL SYNDROME WITH DIARRHEA: Primary | ICD-10-CM

## 2022-05-27 NOTE — TELEPHONE ENCOUNTER
Stool testing negative. CT scan without findings to explain her symptoms. I have sent Xifaxan for treatment of diarrhea for now. Continue with planned procedure.    May need pre-auth

## 2022-06-02 DIAGNOSIS — R11.2 INTRACTABLE VOMITING WITH NAUSEA: ICD-10-CM

## 2022-06-02 RX ORDER — ONDANSETRON HYDROCHLORIDE 8 MG/1
8 TABLET, FILM COATED ORAL EVERY 12 HOURS PRN
Qty: 60 TABLET | Refills: 2 | Status: SHIPPED | OUTPATIENT
Start: 2022-06-02 | End: 2022-09-01 | Stop reason: SDUPTHER

## 2022-06-29 ENCOUNTER — ANESTHESIA EVENT (OUTPATIENT)
Dept: GASTROENTEROLOGY | Facility: HOSPITAL | Age: 32
End: 2022-06-29

## 2022-06-29 ENCOUNTER — HOSPITAL ENCOUNTER (OUTPATIENT)
Facility: HOSPITAL | Age: 32
Setting detail: HOSPITAL OUTPATIENT SURGERY
Discharge: HOME | End: 2022-06-29
Attending: INTERNAL MEDICINE | Admitting: INTERNAL MEDICINE

## 2022-06-29 ENCOUNTER — ANESTHESIA (OUTPATIENT)
Dept: GASTROENTEROLOGY | Facility: HOSPITAL | Age: 32
End: 2022-06-29

## 2022-06-29 VITALS
RESPIRATION RATE: 16 BRPM | TEMPERATURE: 97.4 F | HEART RATE: 60 BPM | DIASTOLIC BLOOD PRESSURE: 87 MMHG | OXYGEN SATURATION: 99 % | WEIGHT: 150 LBS | SYSTOLIC BLOOD PRESSURE: 144 MMHG | HEIGHT: 66 IN | BODY MASS INDEX: 24.11 KG/M2

## 2022-06-29 DIAGNOSIS — R63.4 WEIGHT LOSS, ABNORMAL: ICD-10-CM

## 2022-06-29 DIAGNOSIS — R10.30 LOWER ABDOMINAL PAIN: ICD-10-CM

## 2022-06-29 DIAGNOSIS — R19.7 DIARRHEA, UNSPECIFIED TYPE: ICD-10-CM

## 2022-06-29 PROCEDURE — 25010000002 FENTANYL CITRATE (PF) 100 MCG/2ML SOLUTION: Performed by: NURSE ANESTHETIST, CERTIFIED REGISTERED

## 2022-06-29 PROCEDURE — 25010000002 PROPOFOL 1000 MG/100ML EMULSION: Performed by: NURSE ANESTHETIST, CERTIFIED REGISTERED

## 2022-06-29 PROCEDURE — 45385 COLONOSCOPY W/LESION REMOVAL: CPT | Performed by: INTERNAL MEDICINE

## 2022-06-29 PROCEDURE — 45380 COLONOSCOPY AND BIOPSY: CPT | Performed by: INTERNAL MEDICINE

## 2022-06-29 RX ORDER — KETAMINE HYDROCHLORIDE 50 MG/ML
INJECTION, SOLUTION, CONCENTRATE INTRAMUSCULAR; INTRAVENOUS AS NEEDED
Status: DISCONTINUED | OUTPATIENT
Start: 2022-06-29 | End: 2022-06-29 | Stop reason: SURG

## 2022-06-29 RX ORDER — FENTANYL CITRATE 50 UG/ML
INJECTION, SOLUTION INTRAMUSCULAR; INTRAVENOUS AS NEEDED
Status: DISCONTINUED | OUTPATIENT
Start: 2022-06-29 | End: 2022-06-29 | Stop reason: SURG

## 2022-06-29 RX ORDER — ONDANSETRON 2 MG/ML
4 INJECTION INTRAMUSCULAR; INTRAVENOUS ONCE
Status: DISCONTINUED | OUTPATIENT
Start: 2022-06-29 | End: 2022-06-29 | Stop reason: HOSPADM

## 2022-06-29 RX ORDER — LIDOCAINE HYDROCHLORIDE 20 MG/ML
INJECTION, SOLUTION INTRAVENOUS AS NEEDED
Status: DISCONTINUED | OUTPATIENT
Start: 2022-06-29 | End: 2022-06-29 | Stop reason: SURG

## 2022-06-29 RX ORDER — PROPOFOL 10 MG/ML
INJECTION, EMULSION INTRAVENOUS AS NEEDED
Status: DISCONTINUED | OUTPATIENT
Start: 2022-06-29 | End: 2022-06-29 | Stop reason: SURG

## 2022-06-29 RX ORDER — SODIUM CHLORIDE 9 MG/ML
30 INJECTION, SOLUTION INTRAVENOUS CONTINUOUS PRN
Status: DISCONTINUED | OUTPATIENT
Start: 2022-06-29 | End: 2022-06-29 | Stop reason: HOSPADM

## 2022-06-29 RX ADMIN — SODIUM CHLORIDE 30 ML/HR: 9 INJECTION, SOLUTION INTRAVENOUS at 12:42

## 2022-06-29 RX ADMIN — FENTANYL CITRATE 100 MCG: 50 INJECTION, SOLUTION INTRAMUSCULAR; INTRAVENOUS at 13:56

## 2022-06-29 RX ADMIN — LIDOCAINE HYDROCHLORIDE 60 MG: 20 INJECTION, SOLUTION INTRAVENOUS at 13:56

## 2022-06-29 RX ADMIN — KETAMINE HYDROCHLORIDE 50 MG: 50 INJECTION, SOLUTION INTRAMUSCULAR; INTRAVENOUS at 13:56

## 2022-06-29 RX ADMIN — PROPOFOL 100 MG: 10 INJECTION, EMULSION INTRAVENOUS at 14:10

## 2022-06-29 RX ADMIN — PROPOFOL 100 MG: 10 INJECTION, EMULSION INTRAVENOUS at 13:56

## 2022-06-29 RX ADMIN — KETAMINE HYDROCHLORIDE 50 MG: 50 INJECTION, SOLUTION INTRAMUSCULAR; INTRAVENOUS at 14:07

## 2022-06-29 NOTE — ANESTHESIA PREPROCEDURE EVALUATION
Anesthesia Evaluation     Patient summary reviewed and Nursing notes reviewed   no history of anesthetic complications:  NPO Solid Status: > 8 hours  NPO Liquid Status: > 8 hours           Airway   Mallampati: II  TM distance: >3 FB  Neck ROM: full  no difficulty expected  Dental - normal exam     Pulmonary - negative pulmonary ROS and normal exam   Cardiovascular - negative cardio ROS and normal exam    Rhythm: regular  Rate: normal        Neuro/Psych- negative ROS  GI/Hepatic/Renal/Endo    (+)  GERD, PUD,      Musculoskeletal (-) negative ROS    Abdominal    Substance History - negative use     OB/GYN negative ob/gyn ROS         Other - negative ROS                         Anesthesia Plan    ASA 2     MAC     (Pt told that intravenous sedation will be used as the primary anesthetic along with local anesthesia if necessary. Every effort will be made to make sure the patient is comfortable.     The patient was told they may or may not have recall for the procedure. It was further explained that if the MAC was not adequate that a general anesthetic with either an LMA or endotracheal tube would be required.     Will proceed with the plan of care.)  intravenous induction     Anesthetic plan, risks, benefits, and alternatives have been provided, discussed and informed consent has been obtained with: patient.        CODE STATUS:        Inpatient Physical Exam

## 2022-06-29 NOTE — ANESTHESIA POSTPROCEDURE EVALUATION
Patient: Suzette Levy    Procedure Summary     Date: 06/29/22 Room / Location: [facility] ENDOSCOPY 2 / [facility] ENDOSCOPY    Anesthesia Start: 1356 Anesthesia Stop: 1420    Procedure: COLONOSCOPY WITH BIOPSY (N/A Anus) Diagnosis:       Diarrhea, unspecified type      Lower abdominal pain      Weight loss, abnormal      (Diarrhea, unspecified type [R19.7])      (Lower abdominal pain [R10.30])      (Weight loss, abnormal [R63.4])    Surgeons: Good Worthington MD Provider: Deangelo Vargas CRNA    Anesthesia Type: MAC ASA Status: 2          Anesthesia Type: MAC    Vitals  No vitals data found for the desired time range.          Post Anesthesia Care and Evaluation    Patient location during evaluation: bedside  Patient participation: complete - patient participated  Level of consciousness: awake  Pain score: 0  Pain management: adequate    Airway patency: patent  Anesthetic complications: No anesthetic complications  PONV Status: controlled  Cardiovascular status: acceptable and stable  Respiratory status: acceptable and room air  Hydration status: acceptable    Comments: Vital signs as noted in nursing documentation as per protocol

## 2022-06-30 DIAGNOSIS — K21.00 GASTROESOPHAGEAL REFLUX DISEASE WITH ESOPHAGITIS WITHOUT HEMORRHAGE: ICD-10-CM

## 2022-06-30 RX ORDER — PANTOPRAZOLE SODIUM 40 MG/1
40 TABLET, DELAYED RELEASE ORAL 2 TIMES DAILY
Qty: 60 TABLET | Refills: 0 | Status: SHIPPED | OUTPATIENT
Start: 2022-06-30 | End: 2022-07-30

## 2022-07-01 ENCOUNTER — TELEPHONE (OUTPATIENT)
Dept: GASTROENTEROLOGY | Facility: CLINIC | Age: 32
End: 2022-07-01

## 2022-07-01 LAB — REF LAB TEST METHOD: NORMAL

## 2022-07-01 NOTE — TELEPHONE ENCOUNTER
----- Message from ARIE Zhang sent at 6/30/2022  5:40 PM EDT -----  Per Roxanna's note, she was supposed to decrease to once a day. I have sent in a script to her pharmacy for twice a day for 30 days, but if her reflux is severe and she needs to continue twice a day, she needs to contact Roxanna to discuss further as long term use of PPI's do have potential side effects.    ----- Message -----  From: Radha Guadarrama MA  Sent: 6/30/2022   5:23 PM EDT  To: ARIE Zhang    Patient was taking Protonix 2 per day on a daily script and ran out.  She is now completely out, they are telling her it is too early to fill the script.  What should she do?

## 2022-09-01 ENCOUNTER — OFFICE VISIT (OUTPATIENT)
Dept: GASTROENTEROLOGY | Facility: CLINIC | Age: 32
End: 2022-09-01

## 2022-09-01 VITALS
BODY MASS INDEX: 25.39 KG/M2 | SYSTOLIC BLOOD PRESSURE: 134 MMHG | WEIGHT: 158 LBS | HEART RATE: 91 BPM | TEMPERATURE: 96.2 F | DIASTOLIC BLOOD PRESSURE: 80 MMHG | HEIGHT: 66 IN

## 2022-09-01 DIAGNOSIS — K21.00 GASTROESOPHAGEAL REFLUX DISEASE WITH ESOPHAGITIS WITHOUT HEMORRHAGE: ICD-10-CM

## 2022-09-01 DIAGNOSIS — K62.1 HYPERPLASTIC RECTAL POLYP: ICD-10-CM

## 2022-09-01 DIAGNOSIS — K58.0 IRRITABLE BOWEL SYNDROME WITH DIARRHEA: Primary | ICD-10-CM

## 2022-09-01 DIAGNOSIS — R11.0 NAUSEA: ICD-10-CM

## 2022-09-01 PROCEDURE — 99214 OFFICE O/P EST MOD 30 MIN: CPT | Performed by: PHYSICIAN ASSISTANT

## 2022-09-01 RX ORDER — PANTOPRAZOLE SODIUM 40 MG/1
40 TABLET, DELAYED RELEASE ORAL DAILY
COMMUNITY
Start: 2022-08-12 | End: 2022-09-01 | Stop reason: SDUPTHER

## 2022-09-01 RX ORDER — PANTOPRAZOLE SODIUM 40 MG/1
40 TABLET, DELAYED RELEASE ORAL DAILY
Qty: 30 TABLET | Refills: 5 | Status: SHIPPED | OUTPATIENT
Start: 2022-09-01 | End: 2023-03-17

## 2022-09-01 RX ORDER — ONDANSETRON HYDROCHLORIDE 8 MG/1
8 TABLET, FILM COATED ORAL EVERY 12 HOURS PRN
Qty: 60 TABLET | Refills: 3 | Status: SHIPPED | OUTPATIENT
Start: 2022-09-01 | End: 2022-12-27 | Stop reason: SDUPTHER

## 2022-09-01 NOTE — PROGRESS NOTES
Follow Up Note     Date: 2022   Patient Name: Suzette Levy  MRN: [id number]  : 1990     Primary Care Provider: Chrissy Senior DO     Chief Complaint   Patient presents with   • Follow-up   • Heartburn   • Nausea   • Vomiting     History of present illness:   2022  Suzette Levy is a 32 y.o. female who is here today for follow up regarding Heartburn, Nausea, and Vomiting.     Diarrhea resolved, did not take the previously ordered xifaxan. She is feeling much better than previous. Had colonoscopy and would like to discuss those results. Reflux has been severe intermittently but she feels that diet modifications have helped. Nausea has improved but still takes zofran as needed. Taking protonix 40 mg once daily for GERD.     Interval History:  6/10/2021  At the end of 2021, she started having severe chest burning, indigestion, heartburn all worse with eating. She noticed a very temporary improvement then all symptoms came back. She has soreness in her upper abdomen. Has had nausea in the mornings with intermittent vomiting, sometimes with foam only in vomitus. She feels that her symptoms are gradually getting worse again like they were when first present. Has aching in her epigastric region, feels like a hunger pain even when she is not hungry. These symptoms all seemed to come on suddenly, never had acid reflux in the past. No dysphagia. She ran out of protonix approx 2 weeks ago, currently taking pepcid 40 mg once nightly plus carafate tablet daily. She was previously was taking protonix 40 mg in the mornings and that seemed to help. She was seen at [facility] ED for this problem x2 and had imaging and labs completed, was told everything was normal. Had US gb and told normal.      No blood in vomitus. No black stools. Her bowel movements have been some constipated lately, relates to taking the zofran. Typically, she has normal daily stools. Smokes cigarettes and marijuana daily for approx  10-15 years. No alcohol use.     Subjective     Past Medical History:   Diagnosis Date   • Acid reflux    • Anemia    • Constipation    • Diarrhea    • Nausea & vomiting    • Peptic ulcer    • Rubella non-immune status, antepartum 07/11/2018   • Seasonal allergies    • Stomach pain      Past Surgical History:   Procedure Laterality Date   • COLONOSCOPY N/A 6/29/2022    Procedure: COLONOSCOPY WITH BIOPSY;  Surgeon: Good Worthington MD;  Location: [facility] ENDOSCOPY;  Service: Gastroenterology;  Laterality: N/A;   • ENDOSCOPY N/A 7/14/2021    Procedure: ESOPHAGOGASTRODUODENOSCOPY WITH BIOPSY CPT CODE: 79721;  Surgeon: Good Worthington MD;  Location: [facility] ENDOSCOPY;  Service: Gastroenterology;  Laterality: N/A;   • ENDOSCOPY N/A 3/10/2022    Procedure: ESOPHAGOGASTRODUODENOSCOPY WITH BIOPSY ;  Surgeon: Good Worthington MD;  Location: [facility] ENDOSCOPY;  Service: Gastroenterology;  Laterality: N/A;   • MANDIBLE SURGERY     • TUBAL COAGULATION LAPAROSCOPIC Bilateral 10/6/2018    Procedure: POSTPARTUM TUBAL LIGATION;  Surgeon: Tiffany Corona MD;  Location: [facility] OR;  Service: Obstetrics/Gynecology     Family History   Problem Relation Age of Onset   • Hypertension Father    • Coronary artery disease Father    • Hypertension Mother    • Diabetes Mother    • Diabetes Paternal Grandfather    • Colon cancer Neg Hx      Social History     Socioeconomic History   • Marital status:    Tobacco Use   • Smoking status: Current Every Day Smoker     Packs/day: 0.50     Types: Cigarettes     Start date: 2002   • Smokeless tobacco: Never Used   Vaping Use   • Vaping Use: Never used   Substance and Sexual Activity   • Alcohol use: No   • Drug use: No   • Sexual activity: Defer     Current Outpatient Medications:   •  cetirizine (zyrTEC) 10 MG tablet, Take 10 mg by mouth Daily., Disp: , Rfl:   •  dicyclomine (Bentyl) 10 MG capsule, Take 1 capsule by mouth 3 (Three) Times a Day As Needed (abdominal pain or  diarrhea)., Disp: 90 capsule, Rfl: 1  •  etonogestrel-ethinyl estradiol (NUVARING) 0.12-0.015 MG/24HR vaginal ring, Insert 1 each into the vagina Every 21 (Twenty-One) Days., Disp: , Rfl:   •  fluticasone (FLONASE) 50 MCG/ACT nasal spray, 1 spray into the nostril(s) as directed by provider Daily., Disp: , Rfl:   •  ondansetron (ZOFRAN) 8 MG tablet, Take 1 tablet by mouth Every 12 (Twelve) Hours As Needed for Nausea or Vomiting., Disp: 60 tablet, Rfl: 2  •  pantoprazole (PROTONIX) 40 MG EC tablet, Take 40 mg by mouth Daily., Disp: , Rfl:     No Known Allergies    The following portions of the patient's history were reviewed and updated as appropriate: allergies, current medications, past family history, past medical history, past social history, past surgical history and problem list.    Objective     Physical Exam  Constitutional:       General: She is not in acute distress.     Appearance: Normal appearance. She is well-developed. She is not diaphoretic.   HENT:      Head: Normocephalic and atraumatic.      Right Ear: External ear normal.      Left Ear: External ear normal.      Nose: Nose normal.      Mouth/Throat:      Comments: Wearing a mask  Eyes:      General: No scleral icterus.        Right eye: No discharge.         Left eye: No discharge.      Conjunctiva/sclera: Conjunctivae normal.   Neck:      Trachea: No tracheal deviation.   Pulmonary:      Effort: Pulmonary effort is normal. No respiratory distress.   Musculoskeletal:         General: Normal range of motion.      Cervical back: Normal range of motion.   Skin:     Coloration: Skin is not pale.      Findings: No erythema or rash.   Neurological:      Mental Status: She is alert and oriented to person, place, and time.      Coordination: Coordination normal.   Psychiatric:         Mood and Affect: Mood normal.         Behavior: Behavior normal.         Thought Content: Thought content normal.         Judgment: Judgment normal.       Vitals:    09/01/22  "1307   BP: 134/80   Pulse: 91   Temp: 96.2 °F (35.7 °C)   TempSrc: Infrared   Weight: 71.7 kg (158 lb)   Height: 167.6 cm (66\")       Results Review:   I reviewed the patient's new clinical results.    Labs 5/19/2022: fecal calprotectin 51, GI panel normal, C diff negative, pancreatic elastase 309.      Colonoscopy was completed by Dr. Worthington on 6/29/2022:  - The perianal and digital rectal examinations were normal.  - A 3 mm polyp was found in the distal rectum. The polyp was sessile. The polyp was removed with a cold snare. Resection and retrieval were complete.  - The recto-sigmoid colon, sigmoid colon, descending colon, splenic flexure, transverse colon, hepatic flexure, ascending colon, cecum, appendiceal orifice and ileocecal valve appeared normal.  - The terminal ileum appeared normal. Biopsies were taken with a cold forceps for histology.  - Biopsies for histology were taken with a cold forceps from the right colon, left colon and transverse colon for evaluation of microscopic colitis.   Pathology DIAGNOSIS:   A.     SMALL BOWEL, BIOPSY:   Duodenal type mucosa with no significant histopathologic abnormalities   Negative for Celiac disease, metaplasia, microorganisms or atypia   B.     RANDOM COLON BIOPSIES:   Colonic type mucosa with no significant histopathologic abnormalities   Negative for infection, IBD or microscopic colitis   C.     RECTAL POLYP:   Hyperplastic polyp     Assessment / Plan      1. Irritable bowel syndrome with diarrhea  2. Nausea  Vomiting has now resolved. No longer losing weight, gained back 8 lbs since last visit. Had lost approx 40 lbs since 1 year ago. She takes PPI once daily plus Zofran now as needed for relief of nausea and has good relief from symptoms most days as long as she consumes small portions. Was having lower abdominal pain, occurring daily, worse in the mornings and associated diarrhea with fecal urgency that has improved now. She was given xifaxan but has not taken " it yet due to improvements. Had previous normal US gb. CTAP without contrast 5/2022 unremarkable. Labs 6/2021 unremarkable except increased WBC. Lipase normal. Labs 3/2022 normal TSH and negative for celiac. Stool testing 5/2022 negative.      Colonoscopy 6/2022 with no endoscopic signs of colitis or ileitis. Pathology showed normal random colon biopsies and normal terminal ileum. Likely having functional diarrhea.      Avoid smoking  Continue to avoid marijuana  Continue to consume small portions at meal times, do not overeat  Continue zofran PRN nausea  Continue PPI daily  Bentyl PRN abdominal pain     3. Gastroesophageal reflux disease with esophagitis without hemorrhage  EGD 7/2021 showed LA grade D esophagitis and bilious fluid in the stomach.  There was mildly erythematous mucosa in the gastric fundus, antrum and prepyloric region.  Pathology was benign.  No H. pylori.  Repeat EGD 3/2022 showed improvement with LA grade esophagitis, 1 cm hiatal hernia, patchy erythma in the antrum and prepyloric region of the stomach, normal duodenum. Pathology benign.      She had been taking Protonix 40 mg twice daily since EGD completed on 7/14/2021. Now taking protonix 40 mg once daily for treatment of GERD with good relief. Reglan short therapy was previously given, did not notice much change while taking this.  She is feeling better now, not having frequent episodes as previous with nausea, vomiting resolved.     Continue PPI  Acid reflux measures to continue     4. Hyperplastic rectal polyp  Colonoscopy 6/2022 1 small rectal polyp which was hyperplastic on pathology. Based on these findings and current screening guidelines, she may wait for repeat colonoscopy until the age of 44. No family history of colon cancer.           Follow Up:   Return in 6 months (on 3/1/2023) for recheck GERD and IBS.      Roxanna Wallace PA-C  [facility]  9/9/2022  16:57 EDT    Dictated Utilizing Dragon Dictation: Part of this  note may be an electronic transcription/translation of spoken language to printed text using the Dragon Dictation System.

## 2022-11-02 ENCOUNTER — OFFICE VISIT (OUTPATIENT)
Dept: OBSTETRICS AND GYNECOLOGY | Facility: CLINIC | Age: 32
End: 2022-11-02

## 2022-11-02 VITALS
HEIGHT: 66 IN | SYSTOLIC BLOOD PRESSURE: 130 MMHG | WEIGHT: 150.2 LBS | BODY MASS INDEX: 24.14 KG/M2 | DIASTOLIC BLOOD PRESSURE: 60 MMHG

## 2022-11-02 DIAGNOSIS — N94.6 DYSMENORRHEA: Primary | ICD-10-CM

## 2022-11-02 DIAGNOSIS — N94.10 DYSPAREUNIA IN FEMALE: ICD-10-CM

## 2022-11-02 DIAGNOSIS — N92.1 MENORRHAGIA WITH IRREGULAR CYCLE: ICD-10-CM

## 2022-11-02 DIAGNOSIS — R11.10 INTRACTABLE VOMITING: ICD-10-CM

## 2022-11-02 DIAGNOSIS — N95.1 MENOPAUSAL SYMPTOMS: ICD-10-CM

## 2022-11-02 DIAGNOSIS — R19.7 DIARRHEA, UNSPECIFIED TYPE: ICD-10-CM

## 2022-11-02 DIAGNOSIS — R10.30 LOWER ABDOMINAL PAIN: ICD-10-CM

## 2022-11-02 PROCEDURE — 99204 OFFICE O/P NEW MOD 45 MIN: CPT | Performed by: OBSTETRICS & GYNECOLOGY

## 2022-11-02 RX ORDER — LEVONORGESTREL AND ETHINYL ESTRADIOL 0.15-0.03
1 KIT ORAL DAILY
Qty: 91 TABLET | Refills: 4 | Status: SHIPPED | OUTPATIENT
Start: 2022-11-02 | End: 2023-11-02

## 2022-11-03 NOTE — PROGRESS NOTES
Chief Complaint  Dysmenorrhea (Patient complains of painful periods and heavy bleeding. Patient states they have regular periods. Patient also complains of nausea, vomiting, fatigue for the past two years while on menstrual cycle. Patient complains of brown discharge after period. Patient complains of painful intercourse. )     History of Present Illness:  Patient is 32 y.o.  who presents to [facility] here as a new patient for evaluation of her heavy and painful menstrual cycles.  Patient reports she is having her menstrual cycles usually monthly.  They will vary however in timing.  She reports they are extremely heavy in nature.  Patient has severe pain starting prior to the menstrual cycle as well as throughout the menses.  Patient also reports she will have nausea and vomiting starting before and throughout her menstrual cycle.  She reports her symptoms are severe in nature.  She will also have diarrhea.  Patient was seen at  earlier this year.  She reports having an attempt at placement of an IUD.  Per the report this was unsuccessful secondary to positioning of her uterus.  Patient reports multiple providers attempted placement.  Patient was then placed on NuvaRing which she has remained on since that time.  Patient was initially told to use the NuvaRing continuously.  She did have irregular bleeding however.  Patient has now been cycling with the NuvaRing for the last 2 months.  Patient reports having a Pap smear in January of this year which was normal.  Patient has had a tubal ligation.  Patient also reports having severe dyspareunia as well.  Patient did have previous ultrasound as well as CT scan.  She does report having menopausal symptoms as well.  Patient has been having hot flashes as well as night sweats.  She has not had any hormonal assessment.  The patient also reports she will have lower abdominal pain at other times as well.  She has undergone a GI work-up as  noted.    History  Past Medical History:   Diagnosis Date   • Acid reflux    • Anemia    • Constipation    • Diarrhea    • Nausea & vomiting    • Peptic ulcer    • Rubella non-immune status, antepartum 07/11/2018   • Seasonal allergies    • Stomach pain      Current Outpatient Medications on File Prior to Visit   Medication Sig Dispense Refill   • cetirizine (zyrTEC) 10 MG tablet Take 10 mg by mouth Daily.     • fluticasone (FLONASE) 50 MCG/ACT nasal spray 1 spray into the nostril(s) as directed by provider Daily.     • ondansetron (ZOFRAN) 8 MG tablet Take 1 tablet by mouth Every 12 (Twelve) Hours As Needed for Nausea or Vomiting. 60 tablet 3   • pantoprazole (PROTONIX) 40 MG EC tablet Take 1 tablet by mouth Daily. 30 tablet 5   • dicyclomine (Bentyl) 10 MG capsule Take 1 capsule by mouth 3 (Three) Times a Day As Needed (abdominal pain or diarrhea). 90 capsule 1     No current facility-administered medications on file prior to visit.     No Known Allergies  Past Surgical History:   Procedure Laterality Date   • COLONOSCOPY N/A 6/29/2022    Procedure: COLONOSCOPY WITH BIOPSY;  Surgeon: Good Worthington MD;  Location: [facility] ENDOSCOPY;  Service: Gastroenterology;  Laterality: N/A;   • ENDOSCOPY N/A 7/14/2021    Procedure: ESOPHAGOGASTRODUODENOSCOPY WITH BIOPSY CPT CODE: 25777;  Surgeon: Good Worthington MD;  Location: [facility] ENDOSCOPY;  Service: Gastroenterology;  Laterality: N/A;   • ENDOSCOPY N/A 3/10/2022    Procedure: ESOPHAGOGASTRODUODENOSCOPY WITH BIOPSY ;  Surgeon: Good Worthington MD;  Location: [facility] ENDOSCOPY;  Service: Gastroenterology;  Laterality: N/A;   • MANDIBLE SURGERY     • TUBAL COAGULATION LAPAROSCOPIC Bilateral 10/6/2018    Procedure: POSTPARTUM TUBAL LIGATION;  Surgeon: Tiffany Corona MD;  Location: [facility] OR;  Service: Obstetrics/Gynecology     Family History   Problem Relation Age of Onset   • Hypertension Father    • Coronary artery disease Father    • Hypertension Mother   "  • Diabetes Mother    • Diabetes Paternal Grandfather    • Colon cancer Neg Hx      Social History     Socioeconomic History   • Marital status:    Tobacco Use   • Smoking status: Every Day     Packs/day: 0.50     Types: Cigarettes     Start date: 2002   • Smokeless tobacco: Never   Vaping Use   • Vaping Use: Never used   Substance and Sexual Activity   • Alcohol use: No   • Drug use: No   • Sexual activity: Yes     Partners: Male     Birth control/protection: Tubal ligation, Vaginal contraceptive ring       Physical Examination:  Vital Signs: /60   Ht 167.6 cm (66\")   Wt 68.1 kg (150 lb 3.2 oz)   BMI 24.24 kg/m²     General Appearance: alert, appears stated age, and cooperative  Breasts: Not performed.  Abdomen: no masses, no hepatomegaly, no splenomegaly, soft non-tender, no guarding, and no rebound tenderness  Pelvic: Not performed.    Data Review:  The following data was reviewed by: Tiffany Corona MD on 11/02/2022:     Labs:  TSH (03/08/2022 15:57)    Imaging:  US Non-ob Transvaginal (01/27/2022 13:43)  CT Abdomen Pelvis With Contrast (05/19/2022 13:00)    Medical Records:  Progress Notes by Roxanna Wallace PA-C (09/01/2022 13:00)    Narrative    ARIE Nguyen     2/18/2022 10:57 AM   IUD Insertion   Performed by: ARIE Nguyen   Authorized by: ARIE Nguyen     Consent:     Consent obtained:  Verbal and written     Consent given by:  Patient     Procedure risks and benefits discussed: yes       Patient questions answered: yes       Patient agrees, verbalizes understanding, and wants to proceed: yes     Procedure:     Pelvic exam performed: yes       Negative urine pregnancy test: yes       Cervix cleaned and prepped: yes       Speculum placed in vagina: yes       Stabilization clamp applied to cervix: yes       Uterus sounded: yes       Uterus sound depth (cm):  7.5     IUD inserted with no complications: no       IUD type:  Mirena   Comments:      Unable to insert IUD " in the office due to very anteverted position.   Uterine sound able to passed through os, but unable to pass insertion   device despite traction with allis.   Reviewed option of ultrasound guided insertion.  Specimen Collected: -- Last Resulted: 02/17/22 13:00   Received From: [facility]  Result Received: 05/05/22 12:52       Encounter Summary, generated on Mar. 08, 2022March 08, 2022   Reason for Referral    Other Medical (Routine) - Pending Review  Reason for Referral - Other Medical (Routine) - Pending Review  Specialty Diagnoses / Procedures Referred By Contact Referred To Contact     Diagnoses    Unsuccessful insertion of intrauterine device (IUD)       Procedures    IUD Insertion   Kelly Rosenthal APRN    [address] [facility] [address]    [place] [address]    Phone: [phone]    Fax: [phone]          Reason for Referral - Other Medical (Routine) - Pending Review  Referral ID Status Reason Start Date Expiration Date Visits Requested Visits Authorized   [id number] Pending Review   2/18/2022 8/20/2023 1 1      Electronically signed by Kelly SARGENT at 02/18/2022 10:44 AM EST    Reason for Visit    Reason for Visit -   Reason Comments   Contraception          Other Medical (Routine) - Closed  Reason for Visit - Other Medical (Routine) - Closed  Specialty Diagnoses / Procedures Referred By Contact Referred To Contact   Obstetrics and Gynecology Diagnoses    Dysmenorrhea       Procedures    Insert IUD   Kelly Rosenthal APRN    [address] [address]    Phone: [phone]    Fax: [phone]          Reason for Visit - Other Medical (Routine) - Closed  Referral ID Status Reason Start Date Expiration Date Visits Requested Visits Authorized   [id number] Closed   2/3/2022 8/5/2023 1 1        Encounter Details    Encounter Details  Date Type Department Care Team Description   02/17/2022 Procedure Visit Medical Office Building Obstetrics and Gynecology    [address]  "[address]    [place] [address] [phone]   Kelly Rosenthal, APRN    [address] [facility] [address]    [place] [address] [phone] (Work)    [phone] (Fax)   Encounter for initial prescription of vaginal ring hormonal contraceptive (Primary Dx);   Unsuccessful insertion of intrauterine device (IUD)     Social History  - documented as of this encounter  Social History  Tobacco Use Types Packs/Day Years Used Date   Current Every Day Smoker Cigarettes         Smokeless Tobacco: Never Used           Social History  Alcohol Use Standard Drinks/Week Comments   Never 0 (1 standard drink = 0.6 oz pure alcohol)       Social History  Sex Assigned at Birth Date Recorded   Not on file       Social History  COVID-19 Exposure Response Date Recorded   In the last month, have you been in contact with someone who was confirmed or suspected to have Coronavirus / COVID-19? No / Unsure 2/17/2022 12:58 PM EST     Last Filed Vital Signs  - documented in this encounter  Last Filed Vital Signs  Vital Sign Reading Time Taken Comments   Blood Pressure 131/81 02/17/2022 1:07 PM EST     Pulse - -     Temperature - -     Respiratory Rate - -     Oxygen Saturation - -     Inhaled Oxygen Concentration - -     Weight 74.3 kg (163 lb 12.8 oz) 02/17/2022 1:07 PM EST     Height 165.1 cm (5' 5\") 02/17/2022 1:07 PM EST     Body Mass Index 27.26 02/17/2022 1:07 PM EST       Ordered Prescriptions  - documented in this encounter  Ordered Prescriptions  Prescription Sig Dispensed Refills Start Date End Date   etonogestrel-ethinyl estradiol (Nuvaring) 0.12-0.015 MG/24HR vaginal ring    Indications: Encounter for initial prescription of vaginal ring hormonal contraceptive Insert 1 each into the vagina every 21 (twenty-one) days. Insert 1 vaginal ring every 3 weeks; do not leave ring out for 1 week. 3 each   5 02/17/2022       Miscellaneous Notes  - documented in this encounter  Progress Notes - ARIE Nguyen - 02/17/2022 1:00 " PM EST  Associated Order(s): IUD Insertion    Post-Procedure Diagnose(s): Unsuccessful insertion of intrauterine device (IUD)    Formatting of this note is different from the original.  Gynecology Progress Note    Subjective   31 year old presents for Mirena IUD placement for adenomyosis. Having heavy, painful Periods and has tubal for contraception.     Review of Systems   Constitutional: Negative.   Genitourinary: Positive for menstrual problem.   Psychiatric/Behavioral: Negative.     Objective   Physical Exam  Genitourinary:   Vaginal exam comments: normal.   Uterus is tender.   Uterus is anteverted.   Pulmonary:   Effort: Pulmonary effort is normal.   Neurological:   Mental Status: She is alert and oriented to person, place, and time.   Skin:  General: Skin is warm and dry.   Vitals reviewed. Exam conducted with a chaperone present.     Patient ID: Suzette Levy is a 31 y.o. female.    Encounter Diagnoses   Name Primary?   • IUD (intrauterine device) in place   • Encounter for initial prescription of vaginal ring hormonal contraceptive Yes     IUD Insertion  Performed by: ARIE Nguyen  Authorized by: ARIE Nguyen     Consent:   Consent obtained: Verbal and written  Consent given by: Patient  Procedure risks and benefits discussed: yes   Patient questions answered: yes   Patient agrees, verbalizes understanding, and wants to proceed: yes   Procedure:   Pelvic exam performed: yes   Negative urine pregnancy test: yes   Cervix cleaned and prepped: yes   Speculum placed in vagina: yes   Stabilization clamp applied to cervix: yes   Uterus sounded: yes   Uterus sound depth (cm): 7.5  IUD inserted with no complications: no   IUD type: Mirena  Comments:   Unable to insert IUD in the office due to very anteverted position. Uterine sound able to passed through os, but unable to pass insertion device despite traction with allis.   Reviewed option of ultrasound guided  insertion.    Labs:    Imaging:    Assessment/Plan   Assess/Plan SmartLinks: Diagnoses and all orders for this visit:  Encounter for initial prescription of vaginal ring hormonal contraceptive  Comments:  Will trial continuous vaginal ring for management of symptoms.  Orders:  - POCT Urine Pregnancy  - etonogestrel-ethinyl estradiol (Nuvaring) 0.12-0.015 MG/24HR vaginal ring; Insert 1 each into the vagina every 21 (twenty-one) days. Insert 1 vaginal ring every 3 weeks; do not leave ring out for 1 week.  Unsuccessful insertion of intrauterine device (IUD)  Other orders  - IUD Insertion        Electronically signed by ARIE Nguyen at 02/18/2022 10:57 AM EST    Plan of Treatment  - documented as of this encounter  Not on file    Procedures  - documented in this encounter  Procedures  Procedure Name Priority Date/Time Associated Diagnosis Comments   POCT PREGNANCY, URINE Routine 02/17/2022 1:15 PM EST Encounter for initial prescription of vaginal ring hormonal contraceptive   Results for this procedure are in the results section.     IUD INSERTION Routine 02/17/2022 1:00 PM EST Unsuccessful insertion of intrauterine device (IUD)   Results for this procedure are in the results section.       Lab Results  - documented in this encounter  POCT Urine Pregnancy (02/17/2022 1:15 PM EST)  Lab Results - POCT Urine Pregnancy (02/17/2022 1:15 PM EST)  Component Value Ref Range Performed At Pathologist Signature   Urine Pregnancy - Point of Care Negative - women after 7 weeks gestation and dilute urine (specific gravity <1.010) may have false negative results. Plasma HCG testing is recommended. Test performed at Point of Care. Negative - women after 7 weeks gestation and dilute urine (specific gravity <1.010) may have false negative results. Plasma HCG testing is recommended. Test performed at Point of Care.       INTERNAL QC OK, PREG URINE ok         KIT LOT NUMBER, PREG URINE [id number]         KIT EXPIRATION DATE, PREG  URINE 4/30/2023           Lab Results - POCT Urine Pregnancy (02/17/2022 1:15 PM EST)  Specimen   Urine - Urine specimen obtained by clean catch procedure (specimen)     Miscellaneous Results  - documented in this encounter  IUD INSERTION (02/17/2022 1:00 PM EST)  Miscellaneous Results - IUD INSERTION (02/17/2022 1:00 PM EST)  Kelly Louis APRN - 02/17/2022 1:00 PM EST    ARIE Nguyen     2/18/2022 10:57 AM  IUD Insertion  Performed by: ARIE Nguyen  Authorized by: ARIE Nguyen     Consent:    Consent obtained:  Verbal and written   Consent given by:  Patient   Procedure risks and benefits discussed: yes     Patient questions answered: yes     Patient agrees, verbalizes understanding, and wants to proceed: yes    Procedure:    Pelvic exam performed: yes     Negative urine pregnancy test: yes     Cervix cleaned and prepped: yes     Speculum placed in vagina: yes     Stabilization clamp applied to cervix: yes     Uterus sounded: yes     Uterus sound depth (cm):  7.5   IUD inserted with no complications: no     IUD type:  Mirena  Comments:     Unable to insert IUD in the office due to very anteverted position.   Uterine sound able to passed through os, but unable to pass insertion   device despite traction with allis.   Reviewed option of ultrasound guided insertion.         Assessment and Plan   1. Dysmenorrhea  Suzette Levy was counseled regarding the various etiologies for dysmenorrhea.  The patient was informed that primary dysmenorrhea is painful menstruation in the absence of pathology.  The various options for dysmenorrhea were discussed to include nonsteroidal antiinflammatory drugs, hormonal suppression, or both.  The patient was informed secondary dysmenorrhea is a result of pelvic pathology and is more common in patients with severe dysmenorrhea at menarche or progressively worsening dysmenorrhea, abnormal uterine bleeding, mid-cycle or acyclic pain, infertility,  family history of endometriosis, dyspareunia, or lack of response to empiric therapy.  Evaluation for secondary causes includes pelvic ultrasonography and possible laparoscopy.  The various treatment options for secondary dysmenorrhea depends upon the etiology as discussed.  - US Non-ob Transvaginal    2. Menorrhagia with irregular cycle  The patient was informed that menstrual flow outside of normal volume, duration, regularity, or frequency is considered abnormal uterine bleeding.  The patient was informed that the normal duration of menstrual flow is usually 5 days and the normal cycle typically lasts between 21-35 days.  The patient was informed that heavy menstrual bleeding has been defined as blood loss greater than 80 mL and given that this is hard to quantify excessive blood loss is based on the patient's perception. The patient was informed that AUB most frequently occurs in women aged 19-39 years as a result of pregnancy, structural lesions such as polyps or fibroids, anovulatory cycles, use of hormonal contraception, and endometrial hyperplasia.  She was counseled that endometrial cancer is less common but may occur.  It is recommended that she have a pregnancy test, CBC, and TSH.  Her pap smear needs to be up to date.  She needs screening for Chlamydia if she feels she is at high risk.  It is also recommended that she have a transvaginal ultrasound for further evaluation.  If anatomic abnormalities are noted then then surgery may be indicated. An endometrial ablation may also be an option to control bleeding in women who have completed childbearing.  The various options were discussed regarding medical management of her bleeding to include the use of nonsteroidal antiinflammatory drugs, progestins, combination oral contraceptives, a levonorgestrel intrauterine device, or tranexamic acid. The patient is informed if there is no improvement in her symptoms with medical management then she will need  additional evaluation to include additional laboratory assessment and endometrial sampling.  I have discussed with the patient the various options for management of her symptoms.  We will plan a trial with extended oral contraceptives as noted.  Patient is to remove the NuvaRing and return for labs in 1 week as discussed.  Patient will then start her extended oral contraceptives as well.  Patient is to follow-up as discussed.  Instructions and precautions have been given.  Plan pending response to treatment.  - TSH; Future  - T4, Free; Future  - T3, Free; Future  - CBC & Differential; Future  - Comprehensive Metabolic Panel; Future    3. Intractable vomiting  Patient with intractable vomiting and diarrhea associated with her menstrual cycles.  Patient has also had an extensive GI work-up.  We will plan a trial with extended oral contraceptives as discussed.    4. Diarrhea, unspecified type  Patient with diarrhea as noted.  She has had a GI evaluation as well.  We will plan a trial with extended oral contraceptives for suppression of her menstrual cycles as discussed.    5. Lower abdominal pain  Patient with lower abdominal pain as noted.  She has had CT scan as well.  She is also had a GI evaluation.  Patient is to keep her follow-up appointment with GI as discussed.    6. Menopausal symptoms  The various options for the management of menopausal symptoms was discussed.  The medical treatment options discussed include HRT, SSRIs, SSNRIs, clonidine, and gabapentin.  The risks and benefits were discussed including the findings from the WHI study.  The increased risk of breast cancer, CAD, stroke, and VTE events were discussed for combination therapy vs the increased risk of CV events and breast cancer not being seen in the estrogen only group.  The lowest effective dose for the shortest duration of treatment was discussed in regards to HRT.  Other alternatives including otc supplements and lifestyle changes were also  discussed.  Local estrogen therapy to relieve atrophic vaginal symptoms was discussed was well as other alternatives.  We will obtain hormone levels as discussed.  Plan pending results.  - Follicle Stimulating Hormone; Future  - Estradiol; Future  - Testosterone, Free, Total; Future  - Progesterone; Future    7. Dyspareunia in female  Patient with dyspareunia as noted.  Transvaginal ultrasound was obtained today.  Patient has been informed regarding those findings.  We will plan a trial with extended oral contraceptives as discussed.    Follow Up/Instructions:  Follow up as noted.  Patient was given instructions and counseling regarding her condition or for health maintenance advice. Please see specific information pulled into the AVS if appropriate.     Note: Speech recognition transcription software may have been used to dictate portions of this document.  An attempt at proofreading has been made though minor errors in transcription may still be present.    This note was electronically signed.  Tiffany Corona M.D.

## 2022-11-11 ENCOUNTER — LAB (OUTPATIENT)
Dept: LAB | Facility: HOSPITAL | Age: 32
End: 2022-11-11

## 2022-11-11 DIAGNOSIS — N95.1 MENOPAUSAL SYMPTOMS: ICD-10-CM

## 2022-11-11 DIAGNOSIS — N92.1 MENORRHAGIA WITH IRREGULAR CYCLE: ICD-10-CM

## 2022-11-11 LAB
BASOPHILS # BLD AUTO: 0.1 10*3/MM3 (ref 0–0.2)
BASOPHILS NFR BLD AUTO: 1.4 % (ref 0–1.5)
DEPRECATED RDW RBC AUTO: 40.5 FL (ref 37–54)
EOSINOPHIL # BLD AUTO: 0.38 10*3/MM3 (ref 0–0.4)
EOSINOPHIL NFR BLD AUTO: 5.4 % (ref 0.3–6.2)
ERYTHROCYTE [DISTWIDTH] IN BLOOD BY AUTOMATED COUNT: 12.7 % (ref 12.3–15.4)
HCT VFR BLD AUTO: 44.9 % (ref 34–46.6)
HGB BLD-MCNC: 15.6 G/DL (ref 12–15.9)
IMM GRANULOCYTES # BLD AUTO: 0.01 10*3/MM3 (ref 0–0.05)
IMM GRANULOCYTES NFR BLD AUTO: 0.1 % (ref 0–0.5)
LYMPHOCYTES # BLD AUTO: 2.34 10*3/MM3 (ref 0.7–3.1)
LYMPHOCYTES NFR BLD AUTO: 33.2 % (ref 19.6–45.3)
MCH RBC QN AUTO: 30.2 PG (ref 26.6–33)
MCHC RBC AUTO-ENTMCNC: 34.7 G/DL (ref 31.5–35.7)
MCV RBC AUTO: 87 FL (ref 79–97)
MONOCYTES # BLD AUTO: 0.6 10*3/MM3 (ref 0.1–0.9)
MONOCYTES NFR BLD AUTO: 8.5 % (ref 5–12)
NEUTROPHILS NFR BLD AUTO: 3.61 10*3/MM3 (ref 1.7–7)
NEUTROPHILS NFR BLD AUTO: 51.4 % (ref 42.7–76)
NRBC BLD AUTO-RTO: 0 /100 WBC (ref 0–0.2)
PLATELET # BLD AUTO: 363 10*3/MM3 (ref 140–450)
PMV BLD AUTO: 10.3 FL (ref 6–12)
RBC # BLD AUTO: 5.16 10*6/MM3 (ref 3.77–5.28)
WBC NRBC COR # BLD: 7.04 10*3/MM3 (ref 3.4–10.8)

## 2022-11-11 PROCEDURE — 84402 ASSAY OF FREE TESTOSTERONE: CPT

## 2022-11-11 PROCEDURE — 82670 ASSAY OF TOTAL ESTRADIOL: CPT

## 2022-11-11 PROCEDURE — 84144 ASSAY OF PROGESTERONE: CPT

## 2022-11-11 PROCEDURE — 84439 ASSAY OF FREE THYROXINE: CPT

## 2022-11-11 PROCEDURE — 84481 FREE ASSAY (FT-3): CPT

## 2022-11-11 PROCEDURE — 80050 GENERAL HEALTH PANEL: CPT

## 2022-11-11 PROCEDURE — 83001 ASSAY OF GONADOTROPIN (FSH): CPT

## 2022-11-11 PROCEDURE — 36415 COLL VENOUS BLD VENIPUNCTURE: CPT

## 2022-11-11 PROCEDURE — 84403 ASSAY OF TOTAL TESTOSTERONE: CPT

## 2022-11-12 LAB
ALBUMIN SERPL-MCNC: 4.6 G/DL (ref 3.5–5.2)
ALBUMIN/GLOB SERPL: 1.6 G/DL
ALP SERPL-CCNC: 72 U/L (ref 39–117)
ALT SERPL W P-5'-P-CCNC: <5 U/L (ref 1–33)
ANION GAP SERPL CALCULATED.3IONS-SCNC: 12.1 MMOL/L (ref 5–15)
AST SERPL-CCNC: 10 U/L (ref 1–32)
BILIRUB SERPL-MCNC: 0.3 MG/DL (ref 0–1.2)
BUN SERPL-MCNC: 9 MG/DL (ref 6–20)
BUN/CREAT SERPL: 8.7 (ref 7–25)
CALCIUM SPEC-SCNC: 9.7 MG/DL (ref 8.6–10.5)
CHLORIDE SERPL-SCNC: 101 MMOL/L (ref 98–107)
CO2 SERPL-SCNC: 26.9 MMOL/L (ref 22–29)
CREAT SERPL-MCNC: 1.04 MG/DL (ref 0.57–1)
EGFRCR SERPLBLD CKD-EPI 2021: 72.9 ML/MIN/1.73
ESTRADIOL SERPL HS-MCNC: 59 PG/ML
FSH SERPL-ACNC: 5.83 MIU/ML
GLOBULIN UR ELPH-MCNC: 2.9 GM/DL
GLUCOSE SERPL-MCNC: 101 MG/DL (ref 65–99)
POTASSIUM SERPL-SCNC: 3.6 MMOL/L (ref 3.5–5.2)
PROGEST SERPL-MCNC: 0.08 NG/ML
PROT SERPL-MCNC: 7.5 G/DL (ref 6–8.5)
SODIUM SERPL-SCNC: 140 MMOL/L (ref 136–145)
T3FREE SERPL-MCNC: 3.05 PG/ML (ref 2–4.4)
T4 FREE SERPL-MCNC: 1.32 NG/DL (ref 0.93–1.7)
TSH SERPL DL<=0.05 MIU/L-ACNC: 1.74 UIU/ML (ref 0.27–4.2)

## 2022-11-17 LAB
TESTOST FREE SERPL-MCNC: 0.3 PG/ML (ref 0–4.2)
TESTOST SERPL-MCNC: 29 NG/DL (ref 8–60)

## 2022-12-27 ENCOUNTER — TELEPHONE (OUTPATIENT)
Dept: GASTROENTEROLOGY | Facility: CLINIC | Age: 32
End: 2022-12-27

## 2022-12-27 DIAGNOSIS — R11.0 NAUSEA: ICD-10-CM

## 2022-12-27 RX ORDER — ONDANSETRON HYDROCHLORIDE 8 MG/1
8 TABLET, FILM COATED ORAL EVERY 12 HOURS PRN
Qty: 60 TABLET | Refills: 3 | Status: SHIPPED | OUTPATIENT
Start: 2022-12-27

## 2023-02-23 ENCOUNTER — OFFICE VISIT (OUTPATIENT)
Dept: OBSTETRICS AND GYNECOLOGY | Facility: CLINIC | Age: 33
End: 2023-02-23
Payer: COMMERCIAL

## 2023-02-23 VITALS
DIASTOLIC BLOOD PRESSURE: 70 MMHG | SYSTOLIC BLOOD PRESSURE: 130 MMHG | BODY MASS INDEX: 24.43 KG/M2 | HEIGHT: 66 IN | WEIGHT: 152 LBS

## 2023-02-23 DIAGNOSIS — R11.2 NAUSEA AND VOMITING, UNSPECIFIED VOMITING TYPE: ICD-10-CM

## 2023-02-23 DIAGNOSIS — N94.10 DYSPAREUNIA IN FEMALE: ICD-10-CM

## 2023-02-23 DIAGNOSIS — R10.13 EPIGASTRIC PAIN: ICD-10-CM

## 2023-02-23 DIAGNOSIS — R10.30 LOWER ABDOMINAL PAIN: ICD-10-CM

## 2023-02-23 DIAGNOSIS — N92.1 MENORRHAGIA WITH IRREGULAR CYCLE: Primary | ICD-10-CM

## 2023-02-23 DIAGNOSIS — R19.7 DIARRHEA, UNSPECIFIED TYPE: ICD-10-CM

## 2023-02-23 DIAGNOSIS — N94.6 DYSMENORRHEA: ICD-10-CM

## 2023-02-23 DIAGNOSIS — N95.1 MENOPAUSAL SYMPTOMS: ICD-10-CM

## 2023-02-23 PROCEDURE — 99214 OFFICE O/P EST MOD 30 MIN: CPT | Performed by: OBSTETRICS & GYNECOLOGY

## 2023-02-24 NOTE — PROGRESS NOTES
Chief Complaint  Follow-up (Follow up menorrhagia/dysmenorrhea. Patient advised symptoms improved with oral contraceptives. )     History of Present Illness:  Patient is 32 y.o.  who presents to [facility] here with her significant other for follow-up evaluation of her menorrhagia and dysmenorrhea.  Patient has now started her second 3-month cycle of oral contraceptives.  She is on the second row of the first month.  She reports she had no breakthrough bleeding.  Patient did not take the placebos and started a new pill pack.  Patient has therefore not had a menstrual cycle.  She does report having improvement in her dysmenorrhea and pelvic pain.  She continues to have episodes however of lower abdominal pain as well as epigastric pain.  Patient reports she has a hiatal hernia.  She did have an EGD as well as colonoscopy earlier this year.  She also continues to have nausea and occasional episodes of emesis.  She has continued on her Protonix.  She continues to take Zofran as needed.  She does have a follow-up appointment with GI.  Patient did have previous CT scan but she has not had a HIDA scan.  She had an EGD as well.  She reports she has tolerated her oral contraceptives.  Patient takes them at nighttime.  She reports she has nausea mainly in the a.m.  She reports her symptoms have not been worsened by the oral contraceptives.  Patient has taken them most days.  She occasionally will forget 1 tablet.  She reports she has forgotten 4 in the last 3 months.  She has not had any breakthrough bleeding.  Patient continues to have menopausal symptoms.  She reports no worsening or changes in her symptoms.  Patient reports continued dyspareunia as well.    History  Past Medical History:   Diagnosis Date   • Acid reflux    • Anemia    • Constipation    • Diarrhea    • Nausea & vomiting    • Peptic ulcer    • Rubella non-immune status, antepartum 2018   • Seasonal allergies    • Stomach  pain      Current Outpatient Medications on File Prior to Visit   Medication Sig Dispense Refill   • cetirizine (zyrTEC) 10 MG tablet Take 10 mg by mouth Daily.     • fluticasone (FLONASE) 50 MCG/ACT nasal spray 1 spray into the nostril(s) as directed by provider Daily.     • levonorgestrel-ethinyl estradiol (SEASONALE) 0.15-0.03 MG per tablet Take 1 tablet by mouth Daily. 91 tablet 4   • ondansetron (ZOFRAN) 8 MG tablet Take 1 tablet by mouth Every 12 (Twelve) Hours As Needed for Nausea or Vomiting. 60 tablet 3   • pantoprazole (PROTONIX) 40 MG EC tablet Take 1 tablet by mouth Daily. 30 tablet 5     No current facility-administered medications on file prior to visit.     No Known Allergies  Past Surgical History:   Procedure Laterality Date   • COLONOSCOPY N/A 6/29/2022    Procedure: COLONOSCOPY WITH BIOPSY;  Surgeon: Good Worthington MD;  [facility];  Service: Gastroenterology;  Laterality: N/A;   • ENDOSCOPY N/A 7/14/2021    Procedure: ESOPHAGOGASTRODUODENOSCOPY WITH BIOPSY CPT CODE: 01435;  Surgeon: Good Worthington MD;  [facility];  Service: Gastroenterology;  Laterality: N/A;   • ENDOSCOPY N/A 3/10/2022    Procedure: ESOPHAGOGASTRODUODENOSCOPY WITH BIOPSY ;  Surgeon: Good Worthington MD;  [facility];  Service: Gastroenterology;  Laterality: N/A;   • MANDIBLE SURGERY     • TUBAL COAGULATION LAPAROSCOPIC Bilateral 10/6/2018    Procedure: POSTPARTUM TUBAL LIGATION;  Surgeon: Tiffany Corona MD;  [facility];  Service: Obstetrics/Gynecology     Family History   Problem Relation Age of Onset   • Hypertension Father    • Coronary artery disease Father    • Hypertension Mother    • Diabetes Mother    • Diabetes Paternal Grandfather    • Colon cancer Neg Hx      Social History     Socioeconomic History   • Marital status:    Tobacco Use   • Smoking status: Every Day     Packs/day: 0.50     Types: Cigarettes     Start date: 2002   •  "Smokeless tobacco: Never   Vaping Use   • Vaping Use: Never used   Substance and Sexual Activity   • Alcohol use: No   • Drug use: No   • Sexual activity: Yes     Partners: Male     Birth control/protection: Tubal ligation, Vaginal contraceptive ring       Physical Examination:  Vital Signs: /70   Ht 167.6 cm (66\")   Wt 68.9 kg (152 lb)   BMI 24.53 kg/m²     General Appearance: alert, appears stated age, and cooperative  Breasts: Not performed.  Abdomen: no masses, no hepatomegaly, no splenomegaly, soft non-tender, no guarding, and no rebound tenderness  Pelvic: Not performed.    Data Review:  The following data was reviewed by: Tiffany Corona MD on 02/23/2023:     Labs:  Tissue Pathology Exam (03/10/2022 08:12)  TISSUE EXAM, [facility]) (06/29/2022 14:05)    Imaging:    Medical Records:  UPPER GI ENDOSCOPY (03/10/2022 08:03)  COLONOSCOPY (06/29/2022 13:08)    Assessment and Plan   1. Dysmenorrhea  Patient with improvement in her dysmenorrhea.  I have discussed with the patient the various options for management of her symptoms.  Patient is to continue her extended oral contraceptives.  Patient may skip the placebos as discussed.    2. Menorrhagia with irregular cycle  Patient with improvement in her menorrhagia.  She has tolerated the oral contraceptives as noted.  Instructions and precautions are given.  Patient is to continue with Seasonale as discussed.    3. Diarrhea, unspecified type  Patient with continued diarrhea as noted.  Patient does have a follow-up appointment with GI.  Patient is to keep that follow-up appointment.  Instructions and precautions have been given.    4. Lower abdominal pain  Patient with continued episodes of lower abdominal pain.  She has had an EGD as well as colonoscopy.  Patient is also had CT scan.  She is to keep her follow-up appointment with GI as discussed.    5. Menopausal symptoms  Patient reports no changes in her menopausal symptoms.  She desires to " continue with oral contraceptives.    6. Dyspareunia in female  Patient with continued dyspareunia.  She has completed 1 cycle of her oral contraceptives.  Plan continue Seasonale continuously as discussed.  Patient is to follow-up as noted.    7. Epigastric pain  Patient with epigastric pain as noted.  She has had EGD.  She remains on her Protonix.  Recommend HIDA scan for further evaluation.    8. Nausea and vomiting, unspecified vomiting type  Patient with continued nausea and occasional emesis.  She is to continue with her Zofran and Protonix.  She is to keep her follow-up appointment with GI as discussed.    Follow Up/Instructions:  Follow up as noted.  Patient was given instructions and counseling regarding her condition or for health maintenance advice. Please see specific information pulled into the AVS if appropriate.     Note: Speech recognition transcription software may have been used to dictate portions of this document.  An attempt at proofreading has been made though minor errors in transcription may still be present.    This note was electronically signed.  Tiffany Corona M.D.

## 2023-03-02 ENCOUNTER — OFFICE VISIT (OUTPATIENT)
Dept: GASTROENTEROLOGY | Facility: CLINIC | Age: 33
End: 2023-03-02
Payer: COMMERCIAL

## 2023-03-02 VITALS
BODY MASS INDEX: 25.02 KG/M2 | SYSTOLIC BLOOD PRESSURE: 102 MMHG | OXYGEN SATURATION: 99 % | HEART RATE: 84 BPM | DIASTOLIC BLOOD PRESSURE: 62 MMHG | WEIGHT: 155 LBS

## 2023-03-02 DIAGNOSIS — R63.4 WEIGHT LOSS, ABNORMAL: ICD-10-CM

## 2023-03-02 DIAGNOSIS — K21.00 GASTROESOPHAGEAL REFLUX DISEASE WITH ESOPHAGITIS WITHOUT HEMORRHAGE: ICD-10-CM

## 2023-03-02 DIAGNOSIS — R10.11 RUQ ABDOMINAL PAIN: ICD-10-CM

## 2023-03-02 DIAGNOSIS — R11.2 NAUSEA AND VOMITING, UNSPECIFIED VOMITING TYPE: Primary | ICD-10-CM

## 2023-03-02 PROCEDURE — 1159F MED LIST DOCD IN RCRD: CPT | Performed by: PHYSICIAN ASSISTANT

## 2023-03-02 PROCEDURE — 99214 OFFICE O/P EST MOD 30 MIN: CPT | Performed by: PHYSICIAN ASSISTANT

## 2023-03-02 PROCEDURE — 1160F RVW MEDS BY RX/DR IN RCRD: CPT | Performed by: PHYSICIAN ASSISTANT

## 2023-03-02 NOTE — PROGRESS NOTES
Follow Up Note     Date: 2023   Patient Name: Suzette Levy  MRN: [id number]  : 1990     Primary Care Provider: Chrissy Senior DO     Chief Complaint   Patient presents with   • Nausea     History of present illness:   3/2/2023  Suzette Levy is a 32 y.o. female who is here today for follow up regarding Nausea.    Nausea daily, has RUQ abdominal pain associated with nausea and vomiting after eating anything greasy or fast food. She has been eating a bland diet which helps some. She takes zofran about 2 times daily every day due to severe nausea. Laying flat in bed makes her have worse nausea, has been sleeping in a recliner. She felt some better for a couple of months but lately her symptoms worse again. She is smoking cigarettes daily. Drinks tea, water makes her feel worse. Having regular daily stools, no longer having diarrhea.     She weighed 192 lbs in , lost down to 150 in 2022. Now at 155 lbs, weight stable.     Interval History:  6/10/2021  At the end of 2021, she started having severe chest burning, indigestion, heartburn all worse with eating. She noticed a very temporary improvement then all symptoms came back. She has soreness in her upper abdomen. Has had nausea in the mornings with intermittent vomiting, sometimes with foam only in vomitus. She feels that her symptoms are gradually getting worse again like they were when first present. Has aching in her epigastric region, feels like a hunger pain even when she is not hungry. These symptoms all seemed to come on suddenly, never had acid reflux in the past. No dysphagia. She ran out of protonix approx 2 weeks ago, currently taking pepcid 40 mg once nightly plus carafate tablet daily. She was previously was taking protonix 40 mg in the mornings and that seemed to help. She was seen at [facility] ED for this problem x2 and had imaging and labs completed, was told everything was normal. Had US gb and told normal.      No blood  in vomitus. No black stools. Her bowel movements have been some constipated lately, relates to taking the zofran. Typically, she has normal daily stools. Smokes cigarettes and marijuana daily for approx 10-15 years. No alcohol use.     Subjective     Past Medical History:   Diagnosis Date   • Acid reflux    • Anemia    • Constipation    • Diarrhea    • Nausea & vomiting    • Peptic ulcer    • Rubella non-immune status, antepartum 07/11/2018   • Seasonal allergies    • Stomach pain      Past Surgical History:   Procedure Laterality Date   • COLONOSCOPY N/A 6/29/2022    Procedure: COLONOSCOPY WITH BIOPSY;  Surgeon: Good Worthington MD;  Location: [facility] ENDOSCOPY;  Service: Gastroenterology;  Laterality: N/A;   • ENDOSCOPY N/A 7/14/2021    Procedure: ESOPHAGOGASTRODUODENOSCOPY WITH BIOPSY CPT CODE: 64625;  Surgeon: Good Worthington MD;  Location: [facility] ENDOSCOPY;  Service: Gastroenterology;  Laterality: N/A;   • ENDOSCOPY N/A 3/10/2022    Procedure: ESOPHAGOGASTRODUODENOSCOPY WITH BIOPSY ;  Surgeon: Good Worthington MD;  Location: [facility] ENDOSCOPY;  Service: Gastroenterology;  Laterality: N/A;   • MANDIBLE SURGERY     • TUBAL COAGULATION LAPAROSCOPIC Bilateral 10/6/2018    Procedure: POSTPARTUM TUBAL LIGATION;  Surgeon: Tiffany Corona MD;  Location: [facility] OR;  Service: Obstetrics/Gynecology     Family History   Problem Relation Age of Onset   • Hypertension Father    • Coronary artery disease Father    • Hypertension Mother    • Diabetes Mother    • Diabetes Paternal Grandfather    • Colon cancer Neg Hx      Social History     Socioeconomic History   • Marital status:    Tobacco Use   • Smoking status: Every Day     Packs/day: 0.50     Types: Cigarettes     Start date: 2002   • Smokeless tobacco: Never   Vaping Use   • Vaping Use: Never used   Substance and Sexual Activity   • Alcohol use: No   • Drug use: No   • Sexual activity: Yes     Partners: Male     Birth control/protection: Tubal  ligation, Vaginal contraceptive ring     Current Outpatient Medications:   •  cetirizine (zyrTEC) 10 MG tablet, Take 1 tablet by mouth Daily., Disp: , Rfl:   •  fluticasone (FLONASE) 50 MCG/ACT nasal spray, 1 spray into the nostril(s) as directed by provider Daily., Disp: , Rfl:   •  levonorgestrel-ethinyl estradiol (SEASONALE) 0.15-0.03 MG per tablet, Take 1 tablet by mouth Daily., Disp: 91 tablet, Rfl: 4  •  ondansetron (ZOFRAN) 8 MG tablet, Take 1 tablet by mouth Every 12 (Twelve) Hours As Needed for Nausea or Vomiting., Disp: 60 tablet, Rfl: 3  •  pantoprazole (PROTONIX) 40 MG EC tablet, Take 1 tablet by mouth Daily., Disp: 30 tablet, Rfl: 5    No Known Allergies    The following portions of the patient's history were reviewed and updated as appropriate: allergies, current medications, past family history, past medical history, past social history, past surgical history and problem list.    Objective     Physical Exam  Constitutional:       General: She is not in acute distress.     Appearance: Normal appearance. She is well-developed. She is not diaphoretic.   HENT:      Head: Normocephalic and atraumatic.      Right Ear: External ear normal.      Left Ear: External ear normal.      Nose: Nose normal.      Mouth/Throat:      Comments: Wearing a mask  Eyes:      General: No scleral icterus.        Right eye: No discharge.         Left eye: No discharge.      Conjunctiva/sclera: Conjunctivae normal.   Neck:      Trachea: No tracheal deviation.   Pulmonary:      Effort: Pulmonary effort is normal. No respiratory distress.   Abdominal:      General: Bowel sounds are normal. There is no distension.      Palpations: Abdomen is soft. There is no mass.      Tenderness: There is abdominal tenderness (epigastric, mild). There is no guarding.   Musculoskeletal:         General: Normal range of motion.      Cervical back: Normal range of motion.   Skin:     Coloration: Skin is not pale.      Findings: No erythema or rash.    Neurological:      Mental Status: She is alert and oriented to person, place, and time.      Coordination: Coordination normal.   Psychiatric:         Behavior: Behavior normal.         Thought Content: Thought content normal.         Judgment: Judgment normal.      Comments: Anxious affect, appears agitated        Vitals:    03/02/23 1256   BP: 102/62   Pulse: 84   SpO2: 99%   Weight: 70.3 kg (155 lb)     Results Review:   I reviewed the patient's new clinical results.    Lab on 11/11/2022   Component Date Value Ref Range Status   • T3, Free 11/11/2022 3.05  2.00 - 4.40 pg/mL Final   • FSH 11/11/2022 5.83  mIU/mL Final   • Estradiol 11/11/2022 59.0  pg/mL Final   • Testosterone, Total 11/11/2022 29  8 - 60 ng/dL Final   • Testosterone, Free 11/11/2022 0.3  0.0 - 4.2 pg/mL Final   • Progesterone 11/11/2022 0.08  ng/mL Final   • TSH 11/11/2022 1.740  0.270 - 4.200 uIU/mL Final   • Free T4 11/11/2022 1.32  0.93 - 1.70 ng/dL Final   • Glucose 11/11/2022 101 (H)  65 - 99 mg/dL Final   • BUN 11/11/2022 9  6 - 20 mg/dL Final   • Creatinine 11/11/2022 1.04 (H)  0.57 - 1.00 mg/dL Final   • Sodium 11/11/2022 140  136 - 145 mmol/L Final   • Potassium 11/11/2022 3.6  3.5 - 5.2 mmol/L Final   • Chloride 11/11/2022 101  98 - 107 mmol/L Final   • CO2 11/11/2022 26.9  22.0 - 29.0 mmol/L Final   • Calcium 11/11/2022 9.7  8.6 - 10.5 mg/dL Final   • Total Protein 11/11/2022 7.5  6.0 - 8.5 g/dL Final   • Albumin 11/11/2022 4.60  3.50 - 5.20 g/dL Final   • ALT (SGPT) 11/11/2022 <5  1 - 33 U/L Final   • AST (SGOT) 11/11/2022 10  1 - 32 U/L Final   • Alkaline Phosphatase 11/11/2022 72  39 - 117 U/L Final   • Total Bilirubin 11/11/2022 0.3  0.0 - 1.2 mg/dL Final   • Globulin 11/11/2022 2.9  gm/dL Final   • A/G Ratio 11/11/2022 1.6  g/dL Final   • BUN/Creatinine Ratio 11/11/2022 8.7  7.0 - 25.0 Final   • Anion Gap 11/11/2022 12.1  5.0 - 15.0 mmol/L Final   • eGFR 11/11/2022 72.9  >60.0 mL/min/1.73 Final   • WBC 11/11/2022 7.04  3.40 -  10.80 10*3/mm3 Final   • RBC 11/11/2022 5.16  3.77 - 5.28 10*6/mm3 Final   • Hemoglobin 11/11/2022 15.6  12.0 - 15.9 g/dL Final   • Hematocrit 11/11/2022 44.9  34.0 - 46.6 % Final   • MCV 11/11/2022 87.0  79.0 - 97.0 fL Final   • MCH 11/11/2022 30.2  26.6 - 33.0 pg Final   • MCHC 11/11/2022 34.7  31.5 - 35.7 g/dL Final   • RDW 11/11/2022 12.7  12.3 - 15.4 % Final   • RDW-SD 11/11/2022 40.5  37.0 - 54.0 fl Final   • MPV 11/11/2022 10.3  6.0 - 12.0 fL Final   • Platelets 11/11/2022 363  140 - 450 10*3/mm3 Final      Labs 5/19/2022: fecal calprotectin 51, GI panel normal, C diff negative, pancreatic elastase 309.       CT Abdomen Pelvis Without Contrast  Result Date: 6/25/2021  Unremarkable.      EGD was completed by Dr. Worthington on 3/10/2022:  - The oropharynx was normal.  - The Z-line was regular and was found 37 cm from the incisors.  - LA Grade A (one or more mucosal breaks less than 5 mm, not extending between tops of 2 mucosal folds) esophagitis with no bleeding was found in the lower third of the esophagus. Biopsies were taken with a cold forceps for histology. Her prior esophagitis almost healed. This minimal erythema distal esophagus could be be healed mucosa than esophagitis.  - A 1 cm hiatal hernia was present.  - Patchy mildly erythematous mucosa without bleeding was found in the gastric antrum and in the prepyloric region of the stomach. Biopsies were taken with a cold forceps for Helicobacter pylori testing.  - The duodenal bulb, first portion of the duodenum, second portion of the duodenum and third portion of the duodenum were normal.  Pathology showed reactive gastropathy, no H pylori, no intestinal metaplasia of the gastric biopsy, squamous mucosa with mid reactive changes and no increased intraepithelial eosinophils, intestinal metaplasia or dysplasia of the esophageal biopsy    EGD was completed by Dr. Worthington on 7/14/2021:  - The oropharynx was normal.  - The Z-line was irregular and was found 38  cm from the incisors.  - LA Grade D (one or more mucosal breaks involving at least 75% of esophageal circumference) esophagitis with no  bleeding was found in the lower third of the esophagus.  - Bilious fluid was found in the stomach.  - Patchy mildly erythematous mucosa without bleeding was found in the gastric fundus, in the gastric antrum and in the  prepyloric region of the stomach. Biopsies were taken with a cold forceps for Helicobacter pylori testing.  - The duodenal bulb, first portion of the duodenum, second portion of the duodenum and third portion of the duodenum  were normal. Biopsies for histology were taken with a cold forceps for evaluation of celiac disease.  Pathology showed nonspecific mild chronic duodenitis, gastric mucosa with reactive changes, no H. pylori, negative for dysplasia or malignancy.     Colonoscopy was completed by Dr. Worthington on 6/29/2022:  - The perianal and digital rectal examinations were normal.  - A 3 mm polyp was found in the distal rectum. The polyp was sessile. The polyp was removed with a cold snare. Resection and retrieval were complete.  - The recto-sigmoid colon, sigmoid colon, descending colon, splenic flexure, transverse colon, hepatic flexure, ascending colon, cecum, appendiceal orifice and ileocecal valve appeared normal.  - The terminal ileum appeared normal. Biopsies were taken with a cold forceps for histology.  - Biopsies for histology were taken with a cold forceps from the right colon, left colon and transverse colon for evaluation of microscopic colitis.   Pathology DIAGNOSIS:   A.     SMALL BOWEL, BIOPSY:   Duodenal type mucosa with no significant histopathologic abnormalities   Negative for Celiac disease, metaplasia, microorganisms or atypia   B.     RANDOM COLON BIOPSIES:   Colonic type mucosa with no significant histopathologic abnormalities   Negative for infection, IBD or microscopic colitis   C.     RECTAL POLYP:   Hyperplastic polyp     Assessment  / Plan      1. Nausea and vomiting, unspecified vomiting type  2. RUQ abdominal pain  3. Weight loss, abnormal  4. Gastroesophageal reflux disease with esophagitis without hemorrhage  Vomiting resolved but still with severe and daily nausea. She has lost approx 40 lbs since over 1 year ago when symptoms started. She takes PPI once daily plus Zofran now as needed for relief of nausea without complete relief. Previous, she had reported having good relief from symptoms most days as long as she consumes small portions. Now with reports of RUQ abdominal pain, worse after meals. She was given xifaxan previously but did not take it. Was given short course Reglan, no help. Tried bentyl without improvements. Had previous normal US gb 1-2 years ago at outside facility and told normal. CTAP without contrast 5/2022 unremarkable. Labs 6/2021 unremarkable except increased WBC. Lipase normal. Labs 3/2022 normal TSH and negative for celiac. Stool testing 5/2022 negative.      EGD 7/2021 showed LA grade D esophagitis and bilious fluid in the stomach.  There was mildly erythematous mucosa in the gastric fundus, antrum and prepyloric region.  Pathology was benign.  No H. pylori.  Repeat EGD 3/2022 showed improvement with LA grade esophagitis, 1 cm hiatal hernia, patchy erythma in the antrum and prepyloric region of the stomach, normal duodenum. Pathology benign. Colonoscopy 6/2022 with no endoscopic signs of colitis or ileitis. Pathology showed normal random colon biopsies and normal terminal ileum. Likely having functional diarrhea.     Based on history plus all labs, imaging, endoscopic findings, stool testing findings suspect her symptoms are functional and some component of IBS and continuing to smoke. She requests HIDA scan to be completed. I discussed with her that even if HIDA shows abnormal findings, cholecystectomy may not improve her symptoms. She understands and is agreeable to plan.      Absolute abstinence from smoking  recommended  Continue to avoid marijuana  Continue to consume small portions at meal times, do not overeat  Continue zofran PRN nausea  Continue PPI daily  Ordered HIDA scan at her request, will arrange    - [place] HIDA SCAN WITH PHARMACOLOGICAL INTERVENTION; Future      Follow Up:   Return if symptoms worsen or fail to improve. She will be notified of her results, she will make follow up appt if needed.       Roxanna Wallace PA-C  [facility] [place]  3/10/2023  15:10 EST    Dictated Utilizing Dragon Dictation: Part of this note may be an electronic transcription/translation of spoken language to printed text using the Dragon Dictation System.

## 2023-03-17 DIAGNOSIS — K21.00 GASTROESOPHAGEAL REFLUX DISEASE WITH ESOPHAGITIS WITHOUT HEMORRHAGE: ICD-10-CM

## 2023-03-17 RX ORDER — PANTOPRAZOLE SODIUM 40 MG/1
TABLET, DELAYED RELEASE ORAL
Qty: 30 TABLET | Refills: 5 | Status: SHIPPED | OUTPATIENT
Start: 2023-03-17

## 2023-04-13 DIAGNOSIS — R11.0 NAUSEA: ICD-10-CM

## 2023-04-13 RX ORDER — ONDANSETRON HYDROCHLORIDE 8 MG/1
TABLET, FILM COATED ORAL
Qty: 60 TABLET | Refills: 3 | Status: SHIPPED | OUTPATIENT
Start: 2023-04-13

## 2023-05-11 ENCOUNTER — TELEPHONE (OUTPATIENT)
Dept: GASTROENTEROLOGY | Facility: CLINIC | Age: 33
End: 2023-05-11
Payer: COMMERCIAL

## 2023-05-11 NOTE — TELEPHONE ENCOUNTER
1st attempt:  Called patient re: overdue U/S order.  Left message for the patient to call me back re: what happened.  She was scheduled, but it looks like she cancelled the appt.

## 2023-07-27 DIAGNOSIS — R11.0 NAUSEA: ICD-10-CM

## 2023-07-27 RX ORDER — ONDANSETRON HYDROCHLORIDE 8 MG/1
TABLET, FILM COATED ORAL
Qty: 60 TABLET | Refills: 3 | Status: SHIPPED | OUTPATIENT
Start: 2023-07-27

## 2023-08-02 NOTE — PRE-PROCEDURE INSTRUCTIONS

## 2023-08-17 DIAGNOSIS — K21.00 GASTROESOPHAGEAL REFLUX DISEASE WITH ESOPHAGITIS WITHOUT HEMORRHAGE: ICD-10-CM

## 2023-08-17 RX ORDER — PANTOPRAZOLE SODIUM 40 MG/1
TABLET, DELAYED RELEASE ORAL
Qty: 30 TABLET | Refills: 5 | Status: SHIPPED | OUTPATIENT
Start: 2023-08-17

## 2023-11-15 ENCOUNTER — OFFICE VISIT (OUTPATIENT)
Dept: OBSTETRICS AND GYNECOLOGY | Facility: CLINIC | Age: 33
End: 2023-11-15
Payer: COMMERCIAL

## 2023-11-15 VITALS
HEIGHT: 66 IN | BODY MASS INDEX: 22.66 KG/M2 | WEIGHT: 141 LBS | DIASTOLIC BLOOD PRESSURE: 70 MMHG | SYSTOLIC BLOOD PRESSURE: 118 MMHG

## 2023-11-15 DIAGNOSIS — N94.10 DYSPAREUNIA IN FEMALE: ICD-10-CM

## 2023-11-15 DIAGNOSIS — N94.6 DYSMENORRHEA: ICD-10-CM

## 2023-11-15 DIAGNOSIS — N95.1 MENOPAUSAL SYMPTOMS: ICD-10-CM

## 2023-11-15 DIAGNOSIS — R10.30 LOWER ABDOMINAL PAIN: ICD-10-CM

## 2023-11-15 DIAGNOSIS — R19.7 DIARRHEA, UNSPECIFIED TYPE: ICD-10-CM

## 2023-11-15 DIAGNOSIS — N92.1 MENORRHAGIA WITH IRREGULAR CYCLE: Primary | ICD-10-CM

## 2023-11-15 PROCEDURE — 1159F MED LIST DOCD IN RCRD: CPT | Performed by: OBSTETRICS & GYNECOLOGY

## 2023-11-15 PROCEDURE — 1160F RVW MEDS BY RX/DR IN RCRD: CPT | Performed by: OBSTETRICS & GYNECOLOGY

## 2023-11-15 PROCEDURE — 99214 OFFICE O/P EST MOD 30 MIN: CPT | Performed by: OBSTETRICS & GYNECOLOGY

## 2023-11-15 RX ORDER — LEVONORGESTREL AND ETHINYL ESTRADIOL 0.15-0.03
1 KIT ORAL DAILY
Qty: 91 TABLET | Refills: 3 | Status: SHIPPED | OUTPATIENT
Start: 2023-11-15 | End: 2024-11-14

## 2023-11-17 NOTE — PROGRESS NOTES
Chief Complaint  Follow-up (Follow up menorrhagia. Patient advised only had minimal spotting since starting oral contraceptives. )     History of Present Illness:  Patient is 33 y.o.  who presents to [facility] here for follow-up evaluation of her menorrhagia as well as dysmenorrhea.  Patient is now on her fourth cycle of Seasonale.  Patient does skip the placebo week.  She reports having only spotting.  She has not had any significant heavy bleeding.  She continues to have cramps but reports it has not been severe in nature.  She has not had any problems with taking her oral contraceptives.  She continues to have episodes of nausea and diarrhea.  She denies any headaches or visual disturbances.  She has continued to have menopausal symptoms.  She is also continued to have dyspareunia.  She reports at present however her symptoms are tolerable.  She continues to see her primary care provider.  She has had labs as noted.  She continues to have abdominal pain.  She is on Protonix.  The patient has been seen by GI.  She is to have a HIDA scan and ultrasound.  Patient had to cancel.  She reports she will reschedule.    History  Past Medical History:   Diagnosis Date    Acid reflux     Anemia     Constipation     Diarrhea     Hyperemesis gravidarum     Nausea & vomiting     Peptic ulcer     PONV (postoperative nausea and vomiting)     Rubella non-immune status, antepartum 2018    Seasonal allergies     Stomach pain     Varicella      Current Outpatient Medications on File Prior to Visit   Medication Sig Dispense Refill    cetirizine (zyrTEC) 10 MG tablet Take 1 tablet by mouth Daily.      fluticasone (FLONASE) 50 MCG/ACT nasal spray 1 spray into the nostril(s) as directed by provider Daily.      ondansetron (ZOFRAN) 8 MG tablet TAKE ONE (1) TABLET BY MOUTH EVERY 12 HOURS AS NEEDED FOR NAUSEA OR VOMITING 60 tablet 3    pantoprazole (PROTONIX) 40 MG EC tablet TAKE ONE (1) TABLET BY  "MOUTH EVERY DAY 30 tablet 5     No current facility-administered medications on file prior to visit.     No Known Allergies  Past Surgical History:   Procedure Laterality Date    COLONOSCOPY N/A 06/29/2022    Procedure: COLONOSCOPY WITH BIOPSY;  Surgeon: Good Worthington MD;  Location: [facility] ENDOSCOPY;  Service: Gastroenterology;  Laterality: N/A;    ENDOSCOPY N/A 07/14/2021    Procedure: ESOPHAGOGASTRODUODENOSCOPY WITH BIOPSY CPT CODE: 57814;  Surgeon: Godo Worthington MD;  Location: [facility] ENDOSCOPY;  Service: Gastroenterology;  Laterality: N/A;    ENDOSCOPY N/A 03/10/2022    Procedure: ESOPHAGOGASTRODUODENOSCOPY WITH BIOPSY ;  Surgeon: Good Worthington MD;  Location: [facility] ENDOSCOPY;  Service: Gastroenterology;  Laterality: N/A;    MANDIBLE SURGERY      TUBAL ABDOMINAL LIGATION  2018    TUBAL COAGULATION LAPAROSCOPIC Bilateral 10/06/2018    Procedure: POSTPARTUM TUBAL LIGATION;  Surgeon: Tiffany Corona MD;  Location: [facility] OR;  Service: Obstetrics/Gynecology    WISDOM TOOTH EXTRACTION       Family History   Problem Relation Age of Onset    Hypertension Father     Coronary artery disease Father     Hypertension Mother     Diabetes Mother     Diabetes Paternal Grandfather     Colon cancer Neg Hx      Social History     Socioeconomic History    Marital status:    Tobacco Use    Smoking status: Every Day     Packs/day: 0.50     Years: 15.00     Additional pack years: 0.00     Total pack years: 7.50     Types: Cigarettes     Start date: 1/1/2002    Smokeless tobacco: Never   Vaping Use    Vaping Use: Never used   Substance and Sexual Activity    Alcohol use: No    Drug use: No    Sexual activity: Yes     Partners: Male     Birth control/protection: Tubal ligation, Birth control pill       Physical Examination:  Vital Signs: /70   Ht 167.6 cm (66\")   Wt 64 kg (141 lb)   BMI 22.76 kg/m²     General Appearance: alert, appears stated age, and cooperative  Breasts: Not " performed.  Abdomen: no masses, no hepatomegaly, no splenomegaly, soft non-tender, no guarding, and no rebound tenderness  Pelvic: Not performed.    Data Review:  The following data was reviewed by: Tiffany Corona MD on 11/15/2023:     Labs:  T3, Free (11/11/2022 16:49)  Follicle Stimulating Hormone (11/11/2022 16:49)  Estradiol (11/11/2022 16:49)  Testosterone, Free, Total (11/11/2022 16:49)  Progesterone (11/11/2022 16:49)  TSH (11/11/2022 16:49)  T4, Free (11/11/2022 16:49)  CBC & Differential (11/11/2022 16:49)  Comprehensive Metabolic Panel (11/11/2022 16:49)  Imaging:  CT Abdomen Pelvis With Contrast (05/19/2022 13:00)   Medical Records:  Progress Notes by Roxanna Wallace PA-C (03/02/2023 13:00)     Assessment and Plan   1. Dysmenorrhea  Patient with improvement in her dysmenorrhea.  She has tolerated her oral contraceptives.  She denies any changes in her health.  She desires to continue with her oral contraceptives at present.  Prescription is given as noted.  Instructions precautions have been given.    2. Menorrhagia with irregular cycle  Patient does report having improvement in her menorrhagia.  She only has spotting.  She desires to continue with Seasonale as noted.  Instructions precautions have been given.    3. Lower abdominal pain  Patient with continued abdominal pain.  Patient is to call gastroenterology.  She does need to have her imaging rescheduled as ordered.    4. Diarrhea, unspecified type  Patient with continued episodes of diarrhea.  She did have recent labs as noted.  She is to keep her follow-up appointment with GI as discussed.    5. Menopausal symptoms  Patient with continued menopausal symptoms.  She did have recent labs as noted.  The patient was on her oral contraceptives however at the time.  Instructions precautions have been given.  Patient desires to continue with oral contraceptives as noted.    6. Dyspareunia in female  Patient with continued dyspareunia.  She reports her  present symptoms are tolerable.  She desires to continue with Seasonale at present for management of her symptoms.    Follow Up/Instructions:  Follow up as noted.  Patient was given instructions and counseling regarding her condition or for health maintenance advice. Please see specific information pulled into the AVS if appropriate.     Note: Speech recognition transcription software may have been used to dictate portions of this document.  An attempt at proofreading has been made though minor errors in transcription may still be present.    This note was electronically signed.  Tiffany Corona M.D.

## 2023-11-29 DIAGNOSIS — R11.0 NAUSEA: ICD-10-CM

## 2023-11-29 RX ORDER — ONDANSETRON HYDROCHLORIDE 8 MG/1
TABLET, FILM COATED ORAL
Qty: 60 TABLET | Refills: 3 | Status: SHIPPED | OUTPATIENT
Start: 2023-11-29

## 2024-02-13 DIAGNOSIS — K21.00 GASTROESOPHAGEAL REFLUX DISEASE WITH ESOPHAGITIS WITHOUT HEMORRHAGE: ICD-10-CM

## 2024-02-13 RX ORDER — PANTOPRAZOLE SODIUM 40 MG/1
TABLET, DELAYED RELEASE ORAL
Qty: 30 TABLET | Refills: 5 | Status: SHIPPED | OUTPATIENT
Start: 2024-02-13

## 2024-03-20 DIAGNOSIS — R11.0 NAUSEA: ICD-10-CM

## 2024-03-20 RX ORDER — ONDANSETRON HYDROCHLORIDE 8 MG/1
TABLET, FILM COATED ORAL
Qty: 60 TABLET | Refills: 5 | Status: SHIPPED | OUTPATIENT
Start: 2024-03-20

## 2024-07-22 DIAGNOSIS — K21.00 GASTROESOPHAGEAL REFLUX DISEASE WITH ESOPHAGITIS WITHOUT HEMORRHAGE: ICD-10-CM

## 2024-07-22 RX ORDER — PANTOPRAZOLE SODIUM 40 MG/1
TABLET, DELAYED RELEASE ORAL
Qty: 30 TABLET | Refills: 5 | OUTPATIENT
Start: 2024-07-22

## 2024-08-08 ENCOUNTER — OFFICE VISIT (OUTPATIENT)
Dept: GASTROENTEROLOGY | Facility: CLINIC | Age: 34
End: 2024-08-08
Payer: COMMERCIAL

## 2024-08-08 VITALS
BODY MASS INDEX: 23.73 KG/M2 | WEIGHT: 147 LBS | HEART RATE: 104 BPM | OXYGEN SATURATION: 95 % | DIASTOLIC BLOOD PRESSURE: 74 MMHG | SYSTOLIC BLOOD PRESSURE: 120 MMHG

## 2024-08-08 DIAGNOSIS — R14.0 BLOATING: ICD-10-CM

## 2024-08-08 DIAGNOSIS — R11.0 NAUSEA: ICD-10-CM

## 2024-08-08 DIAGNOSIS — K21.00 GASTROESOPHAGEAL REFLUX DISEASE WITH ESOPHAGITIS WITHOUT HEMORRHAGE: Primary | ICD-10-CM

## 2024-08-08 PROCEDURE — 99214 OFFICE O/P EST MOD 30 MIN: CPT | Performed by: PHYSICIAN ASSISTANT

## 2024-08-08 PROCEDURE — 1159F MED LIST DOCD IN RCRD: CPT | Performed by: PHYSICIAN ASSISTANT

## 2024-08-08 PROCEDURE — 1160F RVW MEDS BY RX/DR IN RCRD: CPT | Performed by: PHYSICIAN ASSISTANT

## 2024-08-08 RX ORDER — PANTOPRAZOLE SODIUM 40 MG/1
40 TABLET, DELAYED RELEASE ORAL DAILY
Qty: 90 TABLET | Refills: 3 | Status: SHIPPED | OUTPATIENT
Start: 2024-08-08

## 2024-08-08 NOTE — PROGRESS NOTES
Follow Up Note     Date: 2024   Patient Name: Suzette Levy  MRN: [id number]  : 1990     Primary Care Provider: Chrissy Senior DO     Chief Complaint   Patient presents with    Med Refill     History of present illness:   2024  Suzette Levy is a 34 y.o. female who is here today for follow up regarding Med Refill (protonix), reflux, gas pain, nausea.     She has not been seen in the GI clinic since 3/2023. Overall, she feels better than previous. She has lost 8 more lbs since then. Has significant abdominal gas pains at night which awake her from sleep. She does not drink any soda, no carbonation. She has tried gas x in the past which did not help. She needs refills of protonix 40 mg once daily which she has been taking for a while now with good relief of reflux symptoms. If she does not take this, after a few days, she is ill with severe reflux, nausea and vomiting.     Interval History:  6/10/2021  At the end of 2021, she started having severe chest burning, indigestion, heartburn all worse with eating. She noticed a very temporary improvement then all symptoms came back. She has soreness in her upper abdomen. Has had nausea in the mornings with intermittent vomiting, sometimes with foam only in vomitus. She feels that her symptoms are gradually getting worse again like they were when first present. Has aching in her epigastric region, feels like a hunger pain even when she is not hungry. These symptoms all seemed to come on suddenly, never had acid reflux in the past. No dysphagia. She ran out of protonix approx 2 weeks ago, currently taking pepcid 40 mg once nightly plus carafate tablet daily. She was previously was taking protonix 40 mg in the mornings and that seemed to help. She was seen at [facility] ED for this problem x2 and had imaging and labs completed, was told everything was normal. Had US gb and told normal.      No blood in vomitus. No black stools. Her bowel movements  have been some constipated lately, relates to taking the zofran. Typically, she has normal daily stools. Smokes cigarettes and marijuana daily for approx 10-15 years. No alcohol use.     Subjective     Past Medical History:   Diagnosis Date    Acid reflux     Anemia     Constipation     Diarrhea     Hyperemesis gravidarum     Nausea & vomiting     Peptic ulcer     PONV (postoperative nausea and vomiting)     Rubella non-immune status, antepartum 07/11/2018    Seasonal allergies     Stomach pain     Varicella      Past Surgical History:   Procedure Laterality Date    COLONOSCOPY N/A 06/29/2022    Procedure: COLONOSCOPY WITH BIOPSY;  Surgeon: Good Worthington MD;  Location: [facility] ENDOSCOPY;  Service: Gastroenterology;  Laterality: N/A;    ENDOSCOPY N/A 07/14/2021    Procedure: ESOPHAGOGASTRODUODENOSCOPY WITH BIOPSY CPT CODE: 68991;  Surgeon: Good Worthington MD;  Location: [facility] ENDOSCOPY;  Service: Gastroenterology;  Laterality: N/A;    ENDOSCOPY N/A 03/10/2022    Procedure: ESOPHAGOGASTRODUODENOSCOPY WITH BIOPSY ;  Surgeon: Good Worthington MD;  Location: [facility] ENDOSCOPY;  Service: Gastroenterology;  Laterality: N/A;    MANDIBLE SURGERY      TUBAL ABDOMINAL LIGATION  2018    TUBAL COAGULATION LAPAROSCOPIC Bilateral 10/06/2018    Procedure: POSTPARTUM TUBAL LIGATION;  Surgeon: Tiffany Corona MD;  Location: [facility] OR;  Service: Obstetrics/Gynecology    UPPER GASTROINTESTINAL ENDOSCOPY      WISDOM TOOTH EXTRACTION       Family History   Problem Relation Age of Onset    Hypertension Father     Coronary artery disease Father     Hypertension Mother     Diabetes Mother     Diabetes Paternal Grandfather     Colon cancer Neg Hx      Social History     Socioeconomic History    Marital status:    Tobacco Use    Smoking status: Every Day     Current packs/day: 0.50     Average packs/day: 0.5 packs/day for 22.6 years (11.3 ttl pk-yrs)     Types: Cigarettes     Start date: 1/1/2002    Smokeless  tobacco: Never   Vaping Use    Vaping status: Never Used   Substance and Sexual Activity    Alcohol use: No    Drug use: No    Sexual activity: Yes     Partners: Male     Birth control/protection: Tubal ligation, Birth control pill     Current Outpatient Medications:     cetirizine (zyrTEC) 10 MG tablet, Take 1 tablet by mouth Daily., Disp: , Rfl:     fluticasone (FLONASE) 50 MCG/ACT nasal spray, 1 spray into the nostril(s) as directed by provider Daily., Disp: , Rfl:     levonorgestrel-ethinyl estradiol (SEASONALE) 0.15-0.03 MG per tablet, Take 1 tablet by mouth Daily., Disp: 91 tablet, Rfl: 3    ondansetron (ZOFRAN) 8 MG tablet, TAKE ONE (1) TABLET BY MOUTH EVERY 12 HOURS AS NEEDED FOR NAUSEA OR FOR VOMITING, Disp: 60 tablet, Rfl: 5    No Known Allergies    The following portions of the patient's history were reviewed and updated as appropriate: allergies, current medications, past family history, past medical history, past social history, past surgical history and problem list.    Objective     Physical Exam  Constitutional:       General: She is not in acute distress.     Appearance: Normal appearance. She is well-developed. She is not diaphoretic.   HENT:      Head: Normocephalic and atraumatic.      Right Ear: External ear normal.      Left Ear: External ear normal.      Nose: Nose normal.   Eyes:      General: No scleral icterus.        Right eye: No discharge.         Left eye: No discharge.      Conjunctiva/sclera: Conjunctivae normal.   Neck:      Trachea: No tracheal deviation.   Pulmonary:      Effort: Pulmonary effort is normal. No respiratory distress.   Musculoskeletal:         General: Normal range of motion.      Cervical back: Normal range of motion.   Skin:     Coloration: Skin is not pale.      Findings: No erythema or rash.   Neurological:      Mental Status: She is alert and oriented to person, place, and time.      Coordination: Coordination normal.   Psychiatric:         Mood and Affect: Mood  normal.         Behavior: Behavior normal.         Thought Content: Thought content normal.         Judgment: Judgment normal.       Vitals:    08/08/24 1504   BP: 120/74   Pulse: 104   SpO2: 95%   Weight: 66.7 kg (147 lb)     Results Review:   No recent labs or abdominal imaging to review.     Labs 5/19/2022: fecal calprotectin 51, GI panel normal, C diff negative, pancreatic elastase 309.       CT Abdomen Pelvis Without Contrast  Result Date: 6/25/2021  Unremarkable.      EGD was completed by Dr. Worthington on 3/10/2022:  - The oropharynx was normal.  - The Z-line was regular and was found 37 cm from the incisors.  - LA Grade A (one or more mucosal breaks less than 5 mm, not extending between tops of 2 mucosal folds) esophagitis with no bleeding was found in the lower third of the esophagus. Biopsies were taken with a cold forceps for histology. Her prior esophagitis almost healed. This minimal erythema distal esophagus could be be healed mucosa than esophagitis.  - A 1 cm hiatal hernia was present.  - Patchy mildly erythematous mucosa without bleeding was found in the gastric antrum and in the prepyloric region of the stomach. Biopsies were taken with a cold forceps for Helicobacter pylori testing.  - The duodenal bulb, first portion of the duodenum, second portion of the duodenum and third portion of the duodenum were normal.  Pathology showed reactive gastropathy, no H pylori, no intestinal metaplasia of the gastric biopsy, squamous mucosa with mid reactive changes and no increased intraepithelial eosinophils, intestinal metaplasia or dysplasia of the esophageal biopsy     EGD was completed by Dr. Worthington on 7/14/2021:  - The oropharynx was normal.  - The Z-line was irregular and was found 38 cm from the incisors.  - LA Grade D (one or more mucosal breaks involving at least 75% of esophageal circumference) esophagitis with no  bleeding was found in the lower third of the esophagus.  - Bilious fluid was found in  the stomach.  - Patchy mildly erythematous mucosa without bleeding was found in the gastric fundus, in the gastric antrum and in the  prepyloric region of the stomach. Biopsies were taken with a cold forceps for Helicobacter pylori testing.  - The duodenal bulb, first portion of the duodenum, second portion of the duodenum and third portion of the duodenum  were normal. Biopsies for histology were taken with a cold forceps for evaluation of celiac disease.  Pathology showed nonspecific mild chronic duodenitis, gastric mucosa with reactive changes, no H. pylori, negative for dysplasia or malignancy.      Colonoscopy was completed by Dr. Worthington on 6/29/2022:  - The perianal and digital rectal examinations were normal.  - A 3 mm polyp was found in the distal rectum. The polyp was sessile. The polyp was removed with a cold snare. Resection and retrieval were complete.  - The recto-sigmoid colon, sigmoid colon, descending colon, splenic flexure, transverse colon, hepatic flexure, ascending colon, cecum, appendiceal orifice and ileocecal valve appeared normal.  - The terminal ileum appeared normal. Biopsies were taken with a cold forceps for histology.  - Biopsies for histology were taken with a cold forceps from the right colon, left colon and transverse colon for evaluation of microscopic colitis.   Pathology DIAGNOSIS:   A.     SMALL BOWEL, BIOPSY:   Duodenal type mucosa with no significant histopathologic abnormalities   Negative for Celiac disease, metaplasia, microorganisms or atypia   B.     RANDOM COLON BIOPSIES:   Colonic type mucosa with no significant histopathologic abnormalities   Negative for infection, IBD or microscopic colitis   C.     RECTAL POLYP:   Hyperplastic polyp     Assessment / Plan      1. Gastroesophageal reflux disease with esophagitis without hemorrhage  2. Nausea  3. Bloating  Nausea, vomiting and abdominal pain all improved now over the past 1 year. She lost over 40 lbs after symptoms  started and were severe back in 2021-22. She has been taking protonix 40 mg once daily with good control of reflux symptoms. She was given xifaxan previously but did not take it. Was given short course Reglan, no help. Tried bentyl without improvements. Had previous normal US gb 1-2 years ago at outside facility and told normal. CTAP without contrast 5/2022 unremarkable. Labs 6/2021 unremarkable except increased WBC. Lipase normal. Labs 3/2022 normal TSH and negative for celiac. Stool testing 5/2022 negative. EGD 7/2021 showed LA grade D esophagitis and bilious fluid in the stomach.  There was mildly erythematous mucosa in the gastric fundus, antrum and prepyloric region.  Pathology was benign.  No H. pylori.  Repeat EGD 3/2022 showed improvement with LA grade esophagitis, 1 cm hiatal hernia, patchy erythma in the antrum and prepyloric region of the stomach, normal duodenum. Pathology benign. Colonoscopy 6/2022 with no endoscopic signs of colitis or ileitis. Pathology showed normal random colon biopsies and normal terminal ileum. Based on history plus all labs, imaging, endoscopic findings, stool testing findings suspect her symptoms are functional and some component of IBS and continuing to smoke.      Absolute abstinence from smoking recommended  Continue to avoid marijuana  Continue to consume small portions at meal times, do not overeat  Continue zofran PRN nausea  Continue PPI daily, refills sent  Beano before meals for next few weeks  Low FODMAP diet may be helpful    - pantoprazole (PROTONIX) 40 MG EC tablet; Take 1 tablet by mouth Daily.  Dispense: 90 tablet; Refill: 3    Follow Up:   Return if symptoms worsen or fail to improve.    Roxanna Wallace PA-C  [facility]  8/8/2024  16:13 EDT    Dictated Utilizing Dragon Dictation: Part of this note may be an electronic transcription/translation of spoken language to printed text using the Dragon Dictation System.

## 2024-09-12 DIAGNOSIS — R11.0 NAUSEA: ICD-10-CM

## 2024-09-12 RX ORDER — ONDANSETRON 8 MG/1
TABLET, FILM COATED ORAL
Qty: 60 TABLET | Refills: 2 | Status: SHIPPED | OUTPATIENT
Start: 2024-09-12

## 2024-12-11 DIAGNOSIS — R11.0 NAUSEA: ICD-10-CM

## 2024-12-11 RX ORDER — ONDANSETRON 8 MG/1
TABLET, FILM COATED ORAL
Qty: 60 TABLET | Refills: 2 | Status: SHIPPED | OUTPATIENT
Start: 2024-12-11

## 2025-01-14 RX ORDER — LEVONORGESTREL AND ETHINYL ESTRADIOL 0.15-0.03
1 KIT ORAL DAILY
Qty: 91 TABLET | Refills: 3 | OUTPATIENT
Start: 2025-01-14

## 2025-03-27 DIAGNOSIS — R11.0 NAUSEA: ICD-10-CM

## 2025-03-27 RX ORDER — ONDANSETRON 8 MG/1
TABLET, FILM COATED ORAL
Qty: 60 TABLET | Refills: 2 | Status: SHIPPED | OUTPATIENT
Start: 2025-03-27

## 2025-08-12 DIAGNOSIS — R11.0 NAUSEA: ICD-10-CM

## 2025-08-13 RX ORDER — ONDANSETRON 8 MG/1
TABLET, FILM COATED ORAL
Qty: 60 TABLET | Refills: 0 | Status: SHIPPED | OUTPATIENT
Start: 2025-08-13

## (undated) DEVICE — HYBRID TUBING/CAP SET FOR OLYMPUS® SCOPES: Brand: ERBE

## (undated) DEVICE — SPNG GZ WOVN 4X4IN 12PLY 10/BX STRL

## (undated) DEVICE — Device

## (undated) DEVICE — GLV SURG BIOGEL M LTX PF 6 1/2

## (undated) DEVICE — ENDOSCOPY PORT CONNECTOR FOR OLYMPUS® SCOPES: Brand: ERBE

## (undated) DEVICE — CONMED SCOPE SAVER BITE BLOCK, 20X27 MM: Brand: SCOPE SAVER

## (undated) DEVICE — FRCP BIOP COLD ENDOJAW ALLGTR W/NDL 2.8X2300MM BLU

## (undated) DEVICE — SUCTION CANISTER, 1500CC, RIGID: Brand: DEROYAL

## (undated) DEVICE — FRCP BX RADJAW4 NDL 2.8 240 STD OG

## (undated) DEVICE — RICH MAJOR PROCEDURE: Brand: MEDLINE INDUSTRIES, INC.

## (undated) DEVICE — SUT ETHLN 3/0 FS1 663G

## (undated) DEVICE — DRSNG WND GZ PAD BORDERED LF 4X5IN STRL

## (undated) DEVICE — VIOLET BRAIDED (POLYGLACTIN 910), SYNTHETIC ABSORBABLE SUTURE: Brand: COATED VICRYL

## (undated) DEVICE — CATHETER,URETHRAL,REDRUBBER,STRL,16FR: Brand: MEDLINE

## (undated) DEVICE — VLV SXN AIR/H2O ORCAPOD3 1P/U STRL

## (undated) DEVICE — DRSNG WND BORDR/ADHS NONADHR/GZ LF 4X4IN STRL

## (undated) DEVICE — CUFF SCD HEMOFORCE SEQ CALF STD MD

## (undated) DEVICE — LUBE JELLY PK/2.75GM STRL BX/144